# Patient Record
Sex: MALE | Race: BLACK OR AFRICAN AMERICAN | NOT HISPANIC OR LATINO | Employment: OTHER | URBAN - METROPOLITAN AREA
[De-identification: names, ages, dates, MRNs, and addresses within clinical notes are randomized per-mention and may not be internally consistent; named-entity substitution may affect disease eponyms.]

---

## 2017-03-20 RX ORDER — POTASSIUM CHLORIDE 1500 MG/1
TABLET, EXTENDED RELEASE ORAL
Qty: 180 TABLET | Refills: 0 | Status: SHIPPED | OUTPATIENT
Start: 2017-03-20 | End: 2017-07-05 | Stop reason: SDUPTHER

## 2017-03-20 RX ORDER — LEVOTHYROXINE SODIUM 75 UG/1
TABLET ORAL
Qty: 90 TABLET | Refills: 0 | Status: SHIPPED | OUTPATIENT
Start: 2017-03-20 | End: 2017-08-21 | Stop reason: SDUPTHER

## 2017-03-20 RX ORDER — METOPROLOL SUCCINATE 200 MG/1
TABLET, EXTENDED RELEASE ORAL
Qty: 90 TABLET | Refills: 0 | Status: SHIPPED | OUTPATIENT
Start: 2017-03-20 | End: 2017-05-01 | Stop reason: SDUPTHER

## 2017-03-20 RX ORDER — NIFEDIPINE 60 MG/1
TABLET, EXTENDED RELEASE ORAL
Qty: 90 TABLET | Refills: 0 | Status: SHIPPED | OUTPATIENT
Start: 2017-03-20 | End: 2017-05-09

## 2017-05-01 RX ORDER — METOPROLOL SUCCINATE 200 MG/1
200 TABLET, EXTENDED RELEASE ORAL DAILY
Qty: 90 TABLET | Refills: 0 | Status: SHIPPED | OUTPATIENT
Start: 2017-05-01 | End: 2017-08-05 | Stop reason: SDUPTHER

## 2017-05-09 ENCOUNTER — OFFICE VISIT (OUTPATIENT)
Dept: INTERNAL MEDICINE | Facility: CLINIC | Age: 62
End: 2017-05-09
Attending: INTERNAL MEDICINE
Payer: MEDICARE

## 2017-05-09 VITALS
HEART RATE: 70 BPM | BODY MASS INDEX: 26.64 KG/M2 | SYSTOLIC BLOOD PRESSURE: 158 MMHG | WEIGHT: 179.88 LBS | DIASTOLIC BLOOD PRESSURE: 84 MMHG | HEIGHT: 69 IN | OXYGEN SATURATION: 99 %

## 2017-05-09 DIAGNOSIS — G89.29 CHRONIC LEFT HIP PAIN: ICD-10-CM

## 2017-05-09 DIAGNOSIS — E89.0 POSTOPERATIVE HYPOTHYROIDISM: Primary | ICD-10-CM

## 2017-05-09 DIAGNOSIS — M25.552 CHRONIC LEFT HIP PAIN: ICD-10-CM

## 2017-05-09 DIAGNOSIS — Z00.00 ANNUAL PHYSICAL EXAM: ICD-10-CM

## 2017-05-09 DIAGNOSIS — C7A.026 MALIGNANT CARCINOID TUMOR OF RECTUM: ICD-10-CM

## 2017-05-09 DIAGNOSIS — K59.00 CONSTIPATION, UNSPECIFIED CONSTIPATION TYPE: ICD-10-CM

## 2017-05-09 DIAGNOSIS — R35.1 NOCTURIA: ICD-10-CM

## 2017-05-09 DIAGNOSIS — N40.0 BENIGN PROSTATIC HYPERPLASIA, PRESENCE OF LOWER URINARY TRACT SYMPTOMS UNSPECIFIED, UNSPECIFIED MORPHOLOGY: ICD-10-CM

## 2017-05-09 DIAGNOSIS — E78.5 HYPERLIPIDEMIA, UNSPECIFIED HYPERLIPIDEMIA TYPE: ICD-10-CM

## 2017-05-09 DIAGNOSIS — M79.89 LEG SWELLING: ICD-10-CM

## 2017-05-09 DIAGNOSIS — I10 ESSENTIAL HYPERTENSION: ICD-10-CM

## 2017-05-09 DIAGNOSIS — G57.11 MERALGIA PARESTHETICA OF RIGHT SIDE: ICD-10-CM

## 2017-05-09 PROCEDURE — 99499 UNLISTED E&M SERVICE: CPT | Mod: S$PBB,,, | Performed by: INTERNAL MEDICINE

## 2017-05-09 PROCEDURE — 3077F SYST BP >= 140 MM HG: CPT | Mod: S$GLB,,, | Performed by: INTERNAL MEDICINE

## 2017-05-09 PROCEDURE — 90670 PCV13 VACCINE IM: CPT | Mod: S$GLB,,, | Performed by: INTERNAL MEDICINE

## 2017-05-09 PROCEDURE — G0009 ADMIN PNEUMOCOCCAL VACCINE: HCPCS | Mod: S$GLB,,, | Performed by: INTERNAL MEDICINE

## 2017-05-09 PROCEDURE — 99214 OFFICE O/P EST MOD 30 MIN: CPT | Mod: 25,S$GLB,, | Performed by: INTERNAL MEDICINE

## 2017-05-09 PROCEDURE — 1160F RVW MEDS BY RX/DR IN RCRD: CPT | Mod: S$GLB,,, | Performed by: INTERNAL MEDICINE

## 2017-05-09 PROCEDURE — 99999 PR PBB SHADOW E&M-EST. PATIENT-LVL III: CPT | Mod: PBBFAC,,, | Performed by: INTERNAL MEDICINE

## 2017-05-09 PROCEDURE — 3079F DIAST BP 80-89 MM HG: CPT | Mod: S$GLB,,, | Performed by: INTERNAL MEDICINE

## 2017-05-09 RX ORDER — LOSARTAN POTASSIUM 100 MG/1
100 TABLET ORAL DAILY
Qty: 90 TABLET | Refills: 1 | Status: SHIPPED | OUTPATIENT
Start: 2017-05-09 | End: 2017-11-22 | Stop reason: SDUPTHER

## 2017-05-09 RX ORDER — NIFEDIPINE 30 MG/1
30 TABLET, FILM COATED, EXTENDED RELEASE ORAL DAILY
Qty: 90 TABLET | Refills: 1 | Status: SHIPPED | OUTPATIENT
Start: 2017-05-09 | End: 2017-08-28 | Stop reason: SDUPTHER

## 2017-05-09 RX ORDER — TRAMADOL HYDROCHLORIDE 50 MG/1
50 TABLET ORAL EVERY 8 HOURS PRN
Qty: 90 TABLET | Refills: 0 | Status: SHIPPED | OUTPATIENT
Start: 2017-05-09 | End: 2018-12-07 | Stop reason: SDUPTHER

## 2017-05-09 RX ORDER — GABAPENTIN 300 MG/1
300 CAPSULE ORAL 3 TIMES DAILY
Qty: 90 CAPSULE | Refills: 11 | Status: SHIPPED | OUTPATIENT
Start: 2017-05-09 | End: 2017-05-30

## 2017-05-09 NOTE — PATIENT INSTRUCTIONS
High fiber diet - 25 grams per day. Emphasis on whole grain breads such as double fiber wheat bread and high fiber cereals and bars.   Drink 8 glasses of water per day 64 - 96 oz per day  Fiber supplements such as KONSYL, METAMUCIL can be taken 1-2 x per day  Regular exercise - 30 min brisk walking 5 days per week  Stool softener such as colace 1 tablet twice a day. If works too well then can take 1 tablet a day or every other day.

## 2017-05-09 NOTE — PROGRESS NOTES
Patient given Prevnar 13 iM in the LD. Patient tolerated well and Band-Aid was applied. Lot#[id number] Exp:04/01/2018. Patient advised to wait in the lobby for 15 min to make sure no adverse reactions occur.Patient given VIS information sheet. Patient states verbal understanding and has no further questions.

## 2017-05-09 NOTE — PROGRESS NOTES
Subjective:       Patient ID: Ronnie Sandoval is a 61 y.o. male.    Chief Complaint: Hypertension    HPI Comments: Here for annual exam  Insurance has changed and he would like for me to be his PCP    HTN  Adheres to low salt diet. He is complaint with antihypertensive medications listed in medication list.  Denies any chest pain, SOB, orthopnea or PND. Several month Hx of bilateral swelling of legs to the point where it becomes painful. Swelling does subside overnight with elevation of legs.    Continued left hip pain. 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 He did not tolerate chemotherapy so he self d/c'd. Was followed by Dr. Hernandez. Patient reports gradual progression of abdominal swelling. He denies pain. He does suffer from longstanding constipation. This improved some with increasing his hydration to 3-40oz of water daily. Does not take his stool softener regularly.    He reports right thigh pain that is described as burning and tingling and is restricted to jean-pierre-lateral portion of right thigh and only occurs with sitting and is resolved within 30 seconds of standing.     Continued LUTS symptoms since discontinuing alpha blocker. He reports trying 2 in the past and both caused frequent dizziness to the point of syncope prompting him to go to ED. He has not had anymore dizziness symptoms since stopping. He continues to suffer from nocturia 5 times nightly.       Review of Systems   Constitutional: Negative for appetite change, chills, fever and unexpected weight change.   HENT: Negative for hearing loss, sore throat and trouble swallowing.    Eyes: Negative for visual disturbance.   Respiratory: Negative for cough, chest tightness and shortness of breath.    Cardiovascular: Negative for chest pain and leg swelling.   Gastrointestinal: Negative for abdominal pain, blood in stool, constipation, diarrhea, nausea and vomiting.  "  Endocrine: Negative for polydipsia and polyuria.   Genitourinary: Positive for frequency. Negative for decreased urine volume, difficulty urinating, dysuria and urgency.   Musculoskeletal: Negative for gait problem.   Skin: Negative for rash.   Neurological: Negative for dizziness and numbness.   Psychiatric/Behavioral: The patient is not nervous/anxious.        Objective:      Vitals:    05/09/17 1017   BP: (!) 158/84   Pulse: 70   SpO2: 99%   Weight: 81.6 kg (179 lb 14.3 oz)   Height: 5' 9" (1.753 m)      Physical Exam   Constitutional: He is oriented to person, place, and time. He appears well-developed and well-nourished. No distress.   HENT:   Head: Normocephalic and atraumatic.   Mouth/Throat: Oropharynx is clear and moist. No oropharyngeal exudate.   Eyes: Conjunctivae and EOM are normal. Pupils are equal, round, and reactive to light. No scleral icterus.   Neck: No thyromegaly present.   Cardiovascular: Normal rate, regular rhythm and normal heart sounds.    No murmur heard.  Pulmonary/Chest: Effort normal and breath sounds normal. He has no wheezes. He has no rales.   Abdominal: Soft. Bowel sounds are normal. He exhibits distension and mass. He exhibits no shifting dullness and no fluid wave. There is no tenderness.   Musculoskeletal: He exhibits no edema or tenderness.   Lymphadenopathy:     He has no cervical adenopathy.   Neurological: He is alert and oriented to person, place, and time.   Skin: Skin is warm and dry.   Psychiatric: He has a normal mood and affect. His behavior is normal.       Assessment:       1. Postoperative hypothyroidism    2. Meralgia paresthetica of right side    3. Benign prostatic hyperplasia, presence of lower urinary tract symptoms unspecified, unspecified morphology    4. Chronic left hip pain    5. Leg swelling    6. Annual physical exam    7. Malignant carcinoid tumor of rectum    8. Hyperlipidemia, unspecified hyperlipidemia type    9. Essential hypertension    10. " Nocturia    11. Constipation, unspecified constipation type        Plan:       Ronnie was seen today for hypertension.    Diagnoses and all orders for this visit:    Postoperative hypothyroidism  -     TSH; Future    Meralgia paresthetica of right side  -suspect related to growing tumor burden. Trial of gabapentin.    -     gabapentin (NEURONTIN) 300 MG capsule; Take 1 capsule (300 mg total) by mouth 3 (three) times daily.    Benign prostatic hyperplasia, presence of lower urinary tract symptoms unspecified, unspecified morphology  -can not tolerate alpha blocker  -     Ambulatory referral to Urology    Chronic left hip pain  2/2 to mets. Ultram 2/2 CKD. SE discussed.  -     tramadol (ULTRAM) 50 mg tablet; Take 1 tablet (50 mg total) by mouth every 8 (eight) hours as needed for Pain.  Leg swelling  -Hx of diastolic dysfunction. On CCB.   -     Brain natriuretic peptide; Future    Annual physical exam  -     CBC auto differential; Future  -     Comprehensive metabolic panel; Future    Malignant carcinoid tumor of rectum  -pt not interested in treatment. Will continue to monitor.  -     Pneumococcal Conjugate Vaccine (13 Valent) (IM)    Hyperlipidemia, unspecified hyperlipidemia type    Essential hypertension  -decrease nifedipine to 30mg in event contributing to LE edema. Increase losartan from 25 to 100mg  f/u in 3-4 weeks for nurse visit for BP check   -     nifedipine (ADALAT CC) 30 MG TbSR; Take 1 tablet (30 mg total) by mouth once daily.  -     losartan (COZAAR) 100 MG tablet; Take 1 tablet (100 mg total) by mouth once daily.    Constipation, unspecified constipation type  -consistent OTC measures discussed.                 Side effects of medication(s) were discussed in detail and patient voiced understanding.  Patient will call back for any issues or complications.

## 2017-05-09 NOTE — MR AVS SNAPSHOT
[facility] Internal Medicine  [address]  [facility] [address]  Phone: [phone]  Fax: [phone]                  Ronnie Sandoval   2017 10:20 AM   Office Visit    Description:  Male : 1955   Provider:  Daniel Hoover MD   Department:  [facility]  Internal Medicine           Reason for Visit     Hypertension           Diagnoses this Visit        Comments    Postoperative hypothyroidism    -  Primary     Meralgia paresthetica of right side         Benign prostatic hyperplasia, presence of lower urinary tract symptoms unspecified, unspecified morphology         Chronic left hip pain         Leg swelling         Annual physical exam         Malignant carcinoid tumor of rectum                To Do List           Future Appointments        Provider Department Dept Phone    2017 12:30 PM LAB, SAME DAY [facility] [phone]    2017 9:00 AM NURSE, [facility] INTERNAL MEDICINE [facility]  Internal Medicine [phone]    2017 9:30 AM Rafael Holland MD [facility] Urology [phone]      Goals (5 Years of Data)     None      Follow-Up and Disposition     Return in about 3 months (around 2017).       These Medications        Disp Refills Start End    tramadol (ULTRAM) 50 mg tablet 90 tablet 0 2017     Take 1 tablet (50 mg total) by mouth every 8 (eight) hours as needed for Pain. - Oral    Pharmacy: [facility] [address] [place] [address] [address] Ph #: [phone]       gabapentin (NEURONTIN) 300 MG capsule 90 capsule 11 2017    Take 1 capsule (300 mg total) by mouth 3 (three) times daily. - Oral    Pharmacy: [facility] [place], [place] - [address] Ph #: [phone]       nifedipine (ADALAT CC) 30 MG TbSR 90 tablet 1 2017    Take 1 tablet (30 mg total) by mouth once daily. - Oral    Pharmacy: [facility] [place], [place] - [address] Ph #: [phone]       losartan  (COZAAR) 100 MG tablet 90 tablet 1 5/9/2017     Take 1 tablet (100 mg total) by mouth once daily. - Oral    Pharmacy: [facility] [address] [place] [address] [address] Ph #: [phone]         [facility]     [facility] Nurse Care Line - 24/7 Assistance  Unless otherwise directed by your provider, please contact [facility], our nurse care line that is available for 24/7 assistance.     Registered nurses in the [facility] provide: appointment scheduling, clinical advisement, health education, and other advisory services.  Call: [phone] (toll free)               Medications           Message regarding Medications     Verify the changes and/or additions to your medication regime listed below are the same as discussed with your clinician today.  If any of these changes or additions are incorrect, please notify your healthcare provider.        START taking these NEW medications        Refills    tramadol (ULTRAM) 50 mg tablet 0    Sig: Take 1 tablet (50 mg total) by mouth every 8 (eight) hours as needed for Pain.    Class: Print    Route: Oral    gabapentin (NEURONTIN) 300 MG capsule 11    Sig: Take 1 capsule (300 mg total) by mouth 3 (three) times daily.    Class: Normal    Route: Oral    nifedipine (ADALAT CC) 30 MG TbSR 1    Sig: Take 1 tablet (30 mg total) by mouth once daily.    Class: Normal    Route: Oral      CHANGE how you are taking these medications     Start Taking Instead of    losartan (COZAAR) 100 MG tablet losartan (COZAAR) 25 MG tablet    Dosage:  Take 1 tablet (100 mg total) by mouth once daily. Dosage:  TAKE TWO TABLETS BY MOUTH ONCE DAILY    Reason for Change:  Reorder       STOP taking these medications     naproxen (NAPROSYN) 250 MG tablet Take 1 tablet (250 mg total) by mouth 2 (two) times daily.    ondansetron (ZOFRAN-ODT) 4 MG TbDL Take 1 tablet (4 mg total) by mouth every 6 (six) hours as needed.    pravastatin (PRAVACHOL) 40 MG tablet TAKE ONE TABLET  "BY MOUTH ONCE DAILY    promethazine (PHENERGAN) 6.25 mg/5 mL syrup Take 10 mLs (12.5 mg total) by mouth every 8 (eight) hours as needed (cough).    nifedipine (PROCARDIA-XL) 60 MG (OSM) 24 hr tablet TAKE ONE TABLET BY MOUTH ONCE DAILY           Verify that the below list of medications is an accurate representation of the medications you are currently taking.  If none reported, the list may be blank. If incorrect, please contact your healthcare provider. Carry this list with you in case of emergency.           Current Medications     KLOR-CON M20 20 mEq tablet TAKE ONE TABLET BY MOUTH TWICE DAILY    levothyroxine (SYNTHROID) 75 MCG tablet TAKE ONE TABLET BY MOUTH ONCE DAILY BEFORE BREAKFAST    losartan (COZAAR) 100 MG tablet Take 1 tablet (100 mg total) by mouth once daily.    metoprolol succinate (TOPROL-XL) 200 MG 24 hr tablet Take 1 tablet (200 mg total) by mouth once daily.    gabapentin (NEURONTIN) 300 MG capsule Take 1 capsule (300 mg total) by mouth 3 (three) times daily.    nifedipine (ADALAT CC) 30 MG TbSR Take 1 tablet (30 mg total) by mouth once daily.    tramadol (ULTRAM) 50 mg tablet Take 1 tablet (50 mg total) by mouth every 8 (eight) hours as needed for Pain.           Clinical Reference Information           Your Vitals Were     BP Pulse Height Weight SpO2 BMI    158/84 70 5' 9" (1.753 m) 81.6 kg (179 lb 14.3 oz) 99% 26.57 kg/m2      Blood Pressure          Most Recent Value    BP  (!)  158/84      Allergies as of 5/9/2017     No Known Allergies      Immunizations Administered on Date of Encounter - 5/9/2017     Name Date Dose VIS Date Route    Pneumococcal Conjugate - 13 Valent  Incomplete 0.5 mL 11/5/2015 Intramuscular      Orders Placed During Today's Visit      Normal Orders This Visit    Ambulatory referral to Urology     Pneumococcal Conjugate Vaccine (13 Valent) (IM)     Future Labs/Procedures Expected by Expires    Brain natriuretic peptide  5/9/2017 8/7/2017    CBC auto differential  " 5/9/2017 5/9/2018    Comprehensive metabolic panel  5/9/2017 7/8/2018    TSH  5/9/2017 5/9/2018      Instructions    High fiber diet - 25 grams per day. Emphasis on whole grain breads such as double fiber wheat bread and high fiber cereals and bars.   Drink 8 glasses of water per day 64 - 96 oz per day  Fiber supplements such as KONSYL, METAMUCIL can be taken 1-2 x per day  Regular exercise - 30 min brisk walking 5 days per week  Stool softener such as colace 1 tablet twice a day. If works too well then can take 1 tablet a day or every other day.         Language Assistance Services     ATTENTION: Language assistance services are available, free of charge. Please call [phone].      ATENCIÓN: Si alberto fung, tiene a nguyễn disposición servicios gratuitos de asistencia lingüística. Llame al [phone].     CHÚ Ý: N?u b?n nói Ti?ng Vi?t, có các d?ch v? h? tr? ngôn ng? mi?n phí dành cho b?n. G?i s? [phone].         [facility] complies with applicable Federal civil rights laws and does not discriminate on the basis of race, color, national origin, age, disability, or sex.

## 2017-05-30 ENCOUNTER — LAB VISIT (OUTPATIENT)
Dept: LAB | Facility: OTHER | Age: 62
End: 2017-05-30
Attending: INTERNAL MEDICINE
Payer: MEDICARE

## 2017-05-30 ENCOUNTER — OFFICE VISIT (OUTPATIENT)
Dept: UROLOGY | Facility: CLINIC | Age: 62
End: 2017-05-30
Attending: INTERNAL MEDICINE
Payer: MEDICARE

## 2017-05-30 ENCOUNTER — CLINICAL SUPPORT (OUTPATIENT)
Dept: INTERNAL MEDICINE | Facility: CLINIC | Age: 62
End: 2017-05-30
Payer: MEDICARE

## 2017-05-30 VITALS
HEART RATE: 70 BPM | BODY MASS INDEX: 26.6 KG/M2 | HEIGHT: 69 IN | DIASTOLIC BLOOD PRESSURE: 99 MMHG | WEIGHT: 179.56 LBS | SYSTOLIC BLOOD PRESSURE: 186 MMHG

## 2017-05-30 DIAGNOSIS — E89.0 POSTOPERATIVE HYPOTHYROIDISM: ICD-10-CM

## 2017-05-30 DIAGNOSIS — N40.1 BENIGN NON-NODULAR PROSTATIC HYPERPLASIA WITH LOWER URINARY TRACT SYMPTOMS: ICD-10-CM

## 2017-05-30 DIAGNOSIS — M79.89 LEG SWELLING: ICD-10-CM

## 2017-05-30 DIAGNOSIS — Z00.00 ANNUAL PHYSICAL EXAM: ICD-10-CM

## 2017-05-30 DIAGNOSIS — R39.15 URINARY URGENCY: Primary | ICD-10-CM

## 2017-05-30 LAB
ALBUMIN SERPL BCP-MCNC: 3.6 G/DL
ALP SERPL-CCNC: 104 U/L
ALT SERPL W/O P-5'-P-CCNC: 19 U/L
ANION GAP SERPL CALC-SCNC: 8 MMOL/L
AST SERPL-CCNC: 40 U/L
BASOPHILS # BLD AUTO: 0.08 K/UL
BASOPHILS NFR BLD: 1.5 %
BILIRUB SERPL-MCNC: 2.7 MG/DL
BILIRUB SERPL-MCNC: ABNORMAL MG/DL
BLOOD URINE, POC: ABNORMAL
BNP SERPL-MCNC: 899 PG/ML
BUN SERPL-MCNC: 23 MG/DL
CALCIUM SERPL-MCNC: 9.4 MG/DL
CHLORIDE SERPL-SCNC: 110 MMOL/L
CO2 SERPL-SCNC: 27 MMOL/L
COLOR, POC UA: ABNORMAL
CREAT SERPL-MCNC: 1.5 MG/DL
DIFFERENTIAL METHOD: ABNORMAL
EOSINOPHIL # BLD AUTO: 0.2 K/UL
EOSINOPHIL NFR BLD: 2.9 %
ERYTHROCYTE [DISTWIDTH] IN BLOOD BY AUTOMATED COUNT: 16 %
EST. GFR  (AFRICAN AMERICAN): 56 ML/MIN/1.73 M^2
EST. GFR  (NON AFRICAN AMERICAN): 49 ML/MIN/1.73 M^2
GLUCOSE SERPL-MCNC: 76 MG/DL
GLUCOSE UR QL STRIP: ABNORMAL
HCT VFR BLD AUTO: 27.6 %
HGB BLD-MCNC: 8.3 G/DL
KETONES UR QL STRIP: ABNORMAL
LEUKOCYTE ESTERASE URINE, POC: ABNORMAL
LYMPHOCYTES # BLD AUTO: 1.5 K/UL
LYMPHOCYTES NFR BLD: 26.6 %
MCH RBC QN AUTO: 31.8 PG
MCHC RBC AUTO-ENTMCNC: 30.1 %
MCV RBC AUTO: 106 FL
MONOCYTES # BLD AUTO: 0.3 K/UL
MONOCYTES NFR BLD: 4.9 %
NEUTROPHILS # BLD AUTO: 3.5 K/UL
NEUTROPHILS NFR BLD: 64.1 %
NITRITE, POC UA: ABNORMAL
PH, POC UA: 7
PLATELET # BLD AUTO: 176 K/UL
PMV BLD AUTO: 12.5 FL
POTASSIUM SERPL-SCNC: 3.8 MMOL/L
PROT SERPL-MCNC: 6.8 G/DL
PROTEIN, POC: ABNORMAL
RBC # BLD AUTO: 2.61 M/UL
SODIUM SERPL-SCNC: 145 MMOL/L
SPECIFIC GRAVITY, POC UA: 1
TSH SERPL DL<=0.005 MIU/L-ACNC: 3.82 UIU/ML
UROBILINOGEN, POC UA: 4
WBC # BLD AUTO: 5.46 K/UL

## 2017-05-30 PROCEDURE — 83880 ASSAY OF NATRIURETIC PEPTIDE: CPT

## 2017-05-30 PROCEDURE — 85025 COMPLETE CBC W/AUTO DIFF WBC: CPT

## 2017-05-30 PROCEDURE — 36415 COLL VENOUS BLD VENIPUNCTURE: CPT

## 2017-05-30 PROCEDURE — 84443 ASSAY THYROID STIM HORMONE: CPT

## 2017-05-30 PROCEDURE — 99204 OFFICE O/P NEW MOD 45 MIN: CPT | Mod: 25,S$GLB,, | Performed by: UROLOGY

## 2017-05-30 PROCEDURE — 80053 COMPREHEN METABOLIC PANEL: CPT

## 2017-05-30 PROCEDURE — 51798 US URINE CAPACITY MEASURE: CPT | Mod: S$GLB,,, | Performed by: UROLOGY

## 2017-05-30 PROCEDURE — 81001 URINALYSIS AUTO W/SCOPE: CPT | Mod: S$GLB,,, | Performed by: UROLOGY

## 2017-05-30 PROCEDURE — 99999 PR PBB SHADOW E&M-EST. PATIENT-LVL I: CPT | Mod: PBBFAC,,,

## 2017-05-30 PROCEDURE — 99999 PR PBB SHADOW E&M-EST. PATIENT-LVL II: CPT | Mod: PBBFAC,,, | Performed by: UROLOGY

## 2017-05-30 NOTE — PROGRESS NOTES
Subjective:      Ronnie Sandoval is a 61 y.o. male who was referred by Dr. Hoover for evaluation of his LUTS.      Benign Prostatic Hypertrophy  Patient complains of lower urinary tract symptoms. He reports frequency, nocturia more than five times a night, straining, urgency and weak stream. He denies incomplete emptying. Patient states symptoms are of moderate severity. Onset of symptoms was a few years ago and was gradual in onset. His AUA Symptom Score is, 18/4 manifested as irritative symptoms including frequency, urgency, nocturia and obstructive symptoms including intermittency, weak stream, straining. He has no personal history and no family history of prostate cancer. He reports a history of no complicating symptoms. He denies flank pain, gross hematuria, kidney stones and recurrent UTI.    Today the patient reports nocturia x5/night, frequency, urgency, and slow urinary stream. He denies incomplete bladder emptying. The patient reports that he has tried flomax last year; however he had to discontinue due to dizziness and syncope. The patient does not consume large amounts of caffeine per day. He does not limit PM fluid intake. Does report lower extremity swelling. He denies frequent UTIs and kidney stones.     The following portions of the patient's history were reviewed and updated as appropriate: allergies, current medications, past family history, past medical history, past social history, past surgical history and problem list.    Review of Systems  Constitutional: no fever or chills  ENT: no nasal congestion or sore throat  Respiratory: no cough or shortness of breath  Cardiovascular: no chest pain or palpitations  Gastrointestinal: no nausea or vomiting, tolerating diet  Genitourinary: as per HPI  Hematologic/Lymphatic: no easy bruising or lymphadenopathy  Musculoskeletal: no arthralgias or myalgias  Neurological: no seizures or tremors  Behavioral/Psych: no auditory or visual hallucinations      Objective:   Vitals:   Vitals:    05/30/17 0957   BP: (!) 186/99   Pulse: 70     Physical Exam   General: alert and oriented, no acute distress  Head: normocephalic, atraumatic  Neck: supple, no lymphadenopathy, normal ROM, no masses  Respiratory: Symmetric expansion, non-labored breathing  Cardiovascular: regular rate and rhythm, nomal pulses, no peripheral edema  Abdomen: soft, non tender, non distended, no palpable masses, no hernias, no hepatomegaly or splenomegaly  Genitourinary:   Prostate: enlarged: bilateral, size: 35 gm; seminal vesicles not palpated  Rectum: normal rectal tone, no rectal mass, normal perineum  Lymphatic: no inguinal nodes  Skin: normal coloration and turgor, no rashes, no suspicious skin lesions noted  Neuro: alert and oriented x3, no gross deficits  Psych: normal judgment and insight, normal mood/affect and non-anxious    Lab Review   Urinalysis demonstrates positive for red blood cells (about 250 hubert/mL) protein (trace), urobilinogen  PVR: 0 mL  Lab Results   Component Value Date    WBC 4.25 08/15/2016    HGB 10.1 (L) 08/15/2016    HCT 31.5 (L) 08/15/2016    HCT 36 08/05/2013    MCV 99 (H) 08/15/2016     08/15/2016     Lab Results   Component Value Date    CREATININE 1.6 (H) 08/15/2016    BUN 23 08/15/2016     Lab Results   Component Value Date    PSA 0.82 01/11/2016     Imaging   None    Assessment:   BPH with LUTS     Plan:   Ronnie was seen today for urinary tract infection and urinary frequency.    Diagnoses and all orders for this visit:    Urinary urgency  -     POCT URINE DIPSTICK WITH MICROSCOPE, AUTOMATED  -     POCT Bladder Scan  -     mirabegron (MYRBETRIQ) 50 mg Tb24; Take 1 tablet (50 mg total) by mouth once daily at 6am.  -     Urine culture    Benign non-nodular prostatic hyperplasia with lower urinary tract symptoms      Plan:   --Encouraged the patient to limit PM fluids and to elevate his legs prior to going to sleep at night   --Trial of myrbetriq for OAB  symptoms  --Follow up in 6 weeks   --May consider the urolift procedure if symptoms persist

## 2017-05-30 NOTE — PROGRESS NOTES
Ronnie Sandoval 61 y.o. male is here today for Blood Pressure check.   History of HTN yes.    Review of patient's allergies indicates:  No Known Allergies  Creatinine   Date Value Ref Range Status   08/15/2016 1.6 (H) 0.5 - 1.4 mg/dL Final     Sodium   Date Value Ref Range Status   08/15/2016 143 136 - 145 mmol/L Final     Potassium   Date Value Ref Range Status   08/15/2016 4.0 3.5 - 5.1 mmol/L Final   ]  Patient verifies taking blood pressure medications on a regular bases at the same time of the day.     Current Outpatient Prescriptions:     gabapentin (NEURONTIN) 300 MG capsule, Take 1 capsule (300 mg total) by mouth 3 (three) times daily., Disp: 90 capsule, Rfl: 11    KLOR-CON M20 20 mEq tablet, TAKE ONE TABLET BY MOUTH TWICE DAILY, Disp: 180 tablet, Rfl: 0    levothyroxine (SYNTHROID) 75 MCG tablet, TAKE ONE TABLET BY MOUTH ONCE DAILY BEFORE BREAKFAST, Disp: 90 tablet, Rfl: 0    losartan (COZAAR) 100 MG tablet, Take 1 tablet (100 mg total) by mouth once daily., Disp: 90 tablet, Rfl: 1    metoprolol succinate (TOPROL-XL) 200 MG 24 hr tablet, Take 1 tablet (200 mg total) by mouth once daily., Disp: 90 tablet, Rfl: 0    nifedipine (ADALAT CC) 30 MG TbSR, Take 1 tablet (30 mg total) by mouth once daily., Disp: 90 tablet, Rfl: 1    tramadol (ULTRAM) 50 mg tablet, Take 1 tablet (50 mg total) by mouth every 8 (eight) hours as needed for Pain., Disp: 90 tablet, Rfl: 0    Current Facility-Administered Medications:     potassium iodide tablet 130 mg, 130 mg, Oral, On Call Procedure, Adryan Hernandez, DO, FACP  Does patient have record of home blood pressure readings no. Readings have been averaging n/a.   Last dose of blood pressure medication was taken at 7:45am 5/30/17.  Patient is asymptomatic.   Complains of mild pain in legs.      ,  .first reading: /100, Pulse 68     Blood pressure reading after 15 minutes was 130/90, Pulse 67.  Dr. Hoover notified.   Medication change: Added nifedipine 30 mg back,  nurse visit scheduled 3 week follow up nurse visit.

## 2017-05-30 NOTE — LETTER
May 30, 2017      Daniel Hoover MD  [address]  [address]  [facility] [address]           [facility]  [address] [place] [address], [facility] [address]  [facility] [address]  Phone: [phone]          Patient: Ronnie Sandoval   MR Number: [id number]   YOB: 1955   Date of Visit: 5/30/2017       Dear Dr. Daniel Hoover:    Thank you for referring Ronnie Sandoval to me for evaluation. Attached you will find relevant portions of my assessment and plan of care.    If you have questions, please do not hesitate to call me. I look forward to following Ronnie Sandoval along with you.    Sincerely,    Rafael Holland MD    Enclosure  CC:  No Recipients    If you would like to receive this communication electronically, please contact [email or web address] or [phone] to request more information on [email or web address] Link access.    For providers and/or their staff who would like to refer a patient to [facility], please contact us through our one-stop-shop provider referral line, [facility], at [phone].    If you feel you have received this communication in error or would no longer like to receive these types of communications, please e-mail [email or web address]

## 2017-05-31 ENCOUNTER — PATIENT MESSAGE (OUTPATIENT)
Dept: INTERNAL MEDICINE | Facility: CLINIC | Age: 62
End: 2017-05-31

## 2017-06-01 LAB — BACTERIA UR CULT: NO GROWTH

## 2017-07-05 RX ORDER — POTASSIUM CHLORIDE 1500 MG/1
TABLET, EXTENDED RELEASE ORAL
Qty: 180 TABLET | Refills: 2 | Status: SHIPPED | OUTPATIENT
Start: 2017-07-05 | End: 2018-05-05 | Stop reason: SDUPTHER

## 2017-07-25 ENCOUNTER — OFFICE VISIT (OUTPATIENT)
Dept: UROLOGY | Facility: CLINIC | Age: 62
End: 2017-07-25
Attending: UROLOGY
Payer: MEDICARE

## 2017-07-25 VITALS
DIASTOLIC BLOOD PRESSURE: 84 MMHG | BODY MASS INDEX: 26.22 KG/M2 | HEIGHT: 69 IN | WEIGHT: 177 LBS | SYSTOLIC BLOOD PRESSURE: 140 MMHG | HEART RATE: 83 BPM

## 2017-07-25 DIAGNOSIS — R31.29 MICROSCOPIC HEMATURIA: ICD-10-CM

## 2017-07-25 DIAGNOSIS — N40.1 BENIGN NON-NODULAR PROSTATIC HYPERPLASIA WITH LOWER URINARY TRACT SYMPTOMS: Primary | ICD-10-CM

## 2017-07-25 LAB
BILIRUB SERPL-MCNC: ABNORMAL MG/DL
BLOOD URINE, POC: 250
COLOR, POC UA: ABNORMAL
GLUCOSE UR QL STRIP: 100
KETONES UR QL STRIP: ABNORMAL
LEUKOCYTE ESTERASE URINE, POC: ABNORMAL
NITRITE, POC UA: ABNORMAL
PH, POC UA: 5
POC RESIDUAL URINE VOLUME: 23 ML (ref 0–100)
PROTEIN, POC: 30
SPECIFIC GRAVITY, POC UA: 1.01
UROBILINOGEN, POC UA: ABNORMAL

## 2017-07-25 PROCEDURE — 87086 URINE CULTURE/COLONY COUNT: CPT

## 2017-07-25 PROCEDURE — 51798 US URINE CAPACITY MEASURE: CPT | Mod: S$GLB,,, | Performed by: UROLOGY

## 2017-07-25 PROCEDURE — 99999 PR PBB SHADOW E&M-EST. PATIENT-LVL III: CPT | Mod: PBBFAC,,, | Performed by: UROLOGY

## 2017-07-25 PROCEDURE — 99214 OFFICE O/P EST MOD 30 MIN: CPT | Mod: 25,S$GLB,, | Performed by: UROLOGY

## 2017-07-25 PROCEDURE — 81002 URINALYSIS NONAUTO W/O SCOPE: CPT | Mod: S$GLB,,, | Performed by: UROLOGY

## 2017-07-25 NOTE — PROGRESS NOTES
"Subjective:      Ronnie Sandoval is a 61 y.o. male who returns today regarding his BPH.    At initial visit 2 mos ago he reported frequency, nocturia more than five times a night, straining, urgency and weak stream. His AUASS was 18/4. He had previously failed flomax due to dizziness. He was given trial of myrbetriq at that time.    Today he reports no symptom improvement. He stopped myrbetriq after 1 month. His AUASS is 21/6.    The following portions of the patient's history were reviewed and updated as appropriate: allergies, current medications, past family history, past medical history, past social history, past surgical history and problem list.    Review of Systems  A comprehensive multipoint review of systems was negative except as otherwise stated in the HPI.     Objective:   Vitals: BP (!) 140/84 (BP Location: Left arm, Patient Position: Sitting, BP Method: Automatic)   Pulse 83   Ht 5' 9" (1.753 m)   Wt 80.3 kg (177 lb 0.5 oz)   BMI 26.14 kg/m²     Physical Exam   General: alert and oriented, no acute distress  Respiratory: Symmetric expansion, non-labored breathing  Skin: normal coloration and turgor, no rashes, no suspicious skin lesions noted  Neuro: no gross deficits  Psych: normal judgment and insight, normal mood/affect and non-anxious    Bladder Scan PVR: 23cc      Lab Review   Urinalysis demonstrates positive for red blood cells  Lab Results   Component Value Date    WBC 5.46 05/30/2017    HGB 8.3 (L) 05/30/2017    HCT 27.6 (L) 05/30/2017    HCT 36 08/05/2013     (H) 05/30/2017     05/30/2017     Lab Results   Component Value Date    CREATININE 1.5 (H) 05/30/2017    BUN 23 05/30/2017     Lab Results   Component Value Date    PSA 0.82 01/11/2016       Assessment and Plan:   1. Benign non-nodular prostatic hyperplasia with lower urinary tract symptoms  -- Symptoms not improved w/ myrbetriq and previously failed flomax d/t SEs  -- Cysto to evaluate for Urolift (vs TURP)    2. " Microscopic hematuria  -- UA micro and urine culture

## 2017-07-26 LAB — BACTERIA UR CULT: NO GROWTH

## 2017-07-29 ENCOUNTER — OFFICE VISIT (OUTPATIENT)
Dept: INTERNAL MEDICINE | Facility: CLINIC | Age: 62
End: 2017-07-29
Payer: MEDICARE

## 2017-07-29 ENCOUNTER — HOSPITAL ENCOUNTER (OUTPATIENT)
Facility: OTHER | Age: 62
Discharge: HOME | End: 2017-07-29
Attending: EMERGENCY MEDICINE
Payer: MEDICARE

## 2017-07-29 VITALS
HEIGHT: 69 IN | SYSTOLIC BLOOD PRESSURE: 146 MMHG | HEART RATE: 88 BPM | OXYGEN SATURATION: 92 % | BODY MASS INDEX: 25.77 KG/M2 | WEIGHT: 174 LBS | TEMPERATURE: 100 F | DIASTOLIC BLOOD PRESSURE: 79 MMHG | RESPIRATION RATE: 16 BRPM

## 2017-07-29 VITALS
DIASTOLIC BLOOD PRESSURE: 63 MMHG | BODY MASS INDEX: 26.53 KG/M2 | WEIGHT: 179.69 LBS | TEMPERATURE: 99 F | SYSTOLIC BLOOD PRESSURE: 117 MMHG | RESPIRATION RATE: 15 BRPM

## 2017-07-29 DIAGNOSIS — R07.2 PRECORDIAL CHEST PAIN: ICD-10-CM

## 2017-07-29 DIAGNOSIS — R19.07 GENERALIZED ABDOMINAL MASS: ICD-10-CM

## 2017-07-29 DIAGNOSIS — D64.9 ANEMIA, UNSPECIFIED TYPE: ICD-10-CM

## 2017-07-29 DIAGNOSIS — R50.9 FUO (FEVER OF UNKNOWN ORIGIN): ICD-10-CM

## 2017-07-29 DIAGNOSIS — R09.89 CHEST CONGESTION: ICD-10-CM

## 2017-07-29 DIAGNOSIS — R09.1 PLEURISY: ICD-10-CM

## 2017-07-29 DIAGNOSIS — J18.9 PNEUMONIA OF LEFT LOWER LOBE DUE TO INFECTIOUS ORGANISM: Primary | ICD-10-CM

## 2017-07-29 DIAGNOSIS — C7A.026 MALIGNANT CARCINOID TUMOR OF RECTUM: Primary | ICD-10-CM

## 2017-07-29 LAB
ABO + RH BLD: NORMAL
ALBUMIN SERPL BCP-MCNC: 3.2 G/DL
ALP SERPL-CCNC: 100 U/L
ALT SERPL W/O P-5'-P-CCNC: 15 U/L
AMORPH CRY URNS QL MICRO: ABNORMAL
ANION GAP SERPL CALC-SCNC: 12 MMOL/L
AST SERPL-CCNC: 25 U/L
BACTERIA #/AREA URNS HPF: ABNORMAL /HPF
BASOPHILS # BLD AUTO: 0.04 K/UL
BASOPHILS NFR BLD: 0.5 %
BILIRUB SERPL-MCNC: 3.8 MG/DL
BILIRUB UR QL STRIP: ABNORMAL
BLD GP AB SCN CELLS X3 SERPL QL: NORMAL
BLD PROD TYP BPU: NORMAL
BLOOD UNIT EXPIRATION DATE: NORMAL
BLOOD UNIT TYPE CODE: 7300
BLOOD UNIT TYPE: NORMAL
BNP SERPL-MCNC: 388 PG/ML
BUN SERPL-MCNC: 29 MG/DL
CALCIUM SERPL-MCNC: 9.6 MG/DL
CHLORIDE SERPL-SCNC: 105 MMOL/L
CLARITY UR: CLEAR
CO2 SERPL-SCNC: 25 MMOL/L
CODING SYSTEM: NORMAL
COLOR UR: ABNORMAL
CREAT SERPL-MCNC: 1.7 MG/DL
DIFFERENTIAL METHOD: ABNORMAL
DISPENSE STATUS: NORMAL
EOSINOPHIL # BLD AUTO: 0.1 K/UL
EOSINOPHIL NFR BLD: 0.6 %
ERYTHROCYTE [DISTWIDTH] IN BLOOD BY AUTOMATED COUNT: 14.8 %
EST. GFR  (AFRICAN AMERICAN): 49 ML/MIN/1.73 M^2
EST. GFR  (NON AFRICAN AMERICAN): 42 ML/MIN/1.73 M^2
GLUCOSE SERPL-MCNC: 97 MG/DL
GLUCOSE UR QL STRIP: NEGATIVE
GRAN CASTS #/AREA URNS LPF: 2 /LPF
HCT VFR BLD AUTO: 24.8 %
HGB BLD-MCNC: 7.6 G/DL
HGB UR QL STRIP: ABNORMAL
HYALINE CASTS #/AREA URNS LPF: 8 /LPF
KETONES UR QL STRIP: ABNORMAL
LEUKOCYTE ESTERASE UR QL STRIP: NEGATIVE
LYMPHOCYTES # BLD AUTO: 1.1 K/UL
LYMPHOCYTES NFR BLD: 13.4 %
MCH RBC QN AUTO: 32.2 PG
MCHC RBC AUTO-ENTMCNC: 30.6 G/DL
MCV RBC AUTO: 105 FL
MICROSCOPIC COMMENT: ABNORMAL
MONOCYTES # BLD AUTO: 0.7 K/UL
MONOCYTES NFR BLD: 8.6 %
NEUTROPHILS # BLD AUTO: 6.3 K/UL
NEUTROPHILS NFR BLD: 76.7 %
NITRITE UR QL STRIP: POSITIVE
PH UR STRIP: 6 [PH] (ref 5–8)
PLATELET # BLD AUTO: 152 K/UL
PMV BLD AUTO: 12 FL
POTASSIUM SERPL-SCNC: 3.7 MMOL/L
PROT SERPL-MCNC: 7 G/DL
PROT UR QL STRIP: ABNORMAL
RBC # BLD AUTO: 2.36 M/UL
RBC #/AREA URNS HPF: 1 /HPF (ref 0–4)
SODIUM SERPL-SCNC: 142 MMOL/L
SP GR UR STRIP: 1.02 (ref 1–1.03)
TRANS ERYTHROCYTES VOL PATIENT: NORMAL ML
TROPONIN I SERPL DL<=0.01 NG/ML-MCNC: 0.02 NG/ML
URN SPEC COLLECT METH UR: ABNORMAL
UROBILINOGEN UR STRIP-ACNC: 1 EU/DL
WBC # BLD AUTO: 8.16 K/UL
WBC #/AREA URNS HPF: 0 /HPF (ref 0–5)

## 2017-07-29 PROCEDURE — 93010 ELECTROCARDIOGRAM REPORT: CPT | Mod: ,,, | Performed by: INTERNAL MEDICINE

## 2017-07-29 PROCEDURE — 85025 COMPLETE CBC W/AUTO DIFF WBC: CPT

## 2017-07-29 PROCEDURE — 81000 URINALYSIS NONAUTO W/SCOPE: CPT

## 2017-07-29 PROCEDURE — G0378 HOSPITAL OBSERVATION PER HR: HCPCS

## 2017-07-29 PROCEDURE — 80053 COMPREHEN METABOLIC PANEL: CPT

## 2017-07-29 PROCEDURE — 86901 BLOOD TYPING SEROLOGIC RH(D): CPT

## 2017-07-29 PROCEDURE — 63600175 PHARM REV CODE 636 W HCPCS: Performed by: EMERGENCY MEDICINE

## 2017-07-29 PROCEDURE — 99214 OFFICE O/P EST MOD 30 MIN: CPT | Mod: S$GLB,,, | Performed by: INTERNAL MEDICINE

## 2017-07-29 PROCEDURE — 96361 HYDRATE IV INFUSION ADD-ON: CPT

## 2017-07-29 PROCEDURE — 25000003 PHARM REV CODE 250: Performed by: EMERGENCY MEDICINE

## 2017-07-29 PROCEDURE — 99284 EMERGENCY DEPT VISIT MOD MDM: CPT | Mod: 25

## 2017-07-29 PROCEDURE — P9021 RED BLOOD CELLS UNIT: HCPCS

## 2017-07-29 PROCEDURE — 86900 BLOOD TYPING SEROLOGIC ABO: CPT

## 2017-07-29 PROCEDURE — 83880 ASSAY OF NATRIURETIC PEPTIDE: CPT

## 2017-07-29 PROCEDURE — 96365 THER/PROPH/DIAG IV INF INIT: CPT

## 2017-07-29 PROCEDURE — 36430 TRANSFUSION BLD/BLD COMPNT: CPT

## 2017-07-29 PROCEDURE — 96375 TX/PRO/DX INJ NEW DRUG ADDON: CPT

## 2017-07-29 PROCEDURE — 86920 COMPATIBILITY TEST SPIN: CPT

## 2017-07-29 PROCEDURE — 84484 ASSAY OF TROPONIN QUANT: CPT

## 2017-07-29 PROCEDURE — 93005 ELECTROCARDIOGRAM TRACING: CPT

## 2017-07-29 PROCEDURE — 99999 PR PBB SHADOW E&M-EST. PATIENT-LVL III: CPT | Mod: PBBFAC,,, | Performed by: INTERNAL MEDICINE

## 2017-07-29 RX ORDER — MOXIFLOXACIN HYDROCHLORIDE 400 MG/1
400 TABLET ORAL DAILY
Qty: 10 TABLET | Refills: 0 | Status: SHIPPED | OUTPATIENT
Start: 2017-07-29 | End: 2017-08-28

## 2017-07-29 RX ORDER — MOXIFLOXACIN HYDROCHLORIDE 400 MG/250ML
400 INJECTION, SOLUTION INTRAVENOUS
Status: COMPLETED | OUTPATIENT
Start: 2017-07-29 | End: 2017-07-29

## 2017-07-29 RX ORDER — FUROSEMIDE 10 MG/ML
20 INJECTION INTRAMUSCULAR; INTRAVENOUS
Status: DISCONTINUED | OUTPATIENT
Start: 2017-07-29 | End: 2017-07-29 | Stop reason: HOSPADM

## 2017-07-29 RX ORDER — HYDROCODONE BITARTRATE AND ACETAMINOPHEN 500; 5 MG/1; MG/1
TABLET ORAL
Status: DISCONTINUED | OUTPATIENT
Start: 2017-07-29 | End: 2017-07-29 | Stop reason: HOSPADM

## 2017-07-29 RX ADMIN — SODIUM CHLORIDE 250 ML: 0.9 INJECTION, SOLUTION INTRAVENOUS at 12:07

## 2017-07-29 RX ADMIN — FUROSEMIDE 20 MG: 10 INJECTION, SOLUTION INTRAMUSCULAR; INTRAVENOUS at 03:07

## 2017-07-29 RX ADMIN — MOXIFLOXACIN HYDROCHLORIDE 400 MG: 400 INJECTION, SOLUTION INTRAVENOUS at 01:07

## 2017-07-29 NOTE — ED NOTES
Pt rounding complete.  Pain 0/10, watching television, NAD.  Restroom and comfort needs addressed.  Pt updated on plan of care.  Call light within reach.  Will continue to monitor.

## 2017-07-29 NOTE — ED NOTES
Pt resting comfortably. Restroom and comfort addressed. MD at bedside. MD Garza to order a meal tray

## 2017-07-29 NOTE — PROGRESS NOTES
Subjective:       Patient ID: Ronnie Sandoval is a 61 y.o. male.    Chief Complaint: Fever; Fatigue; and Cough  Dictation #1  MRN:[id number]  CSN:[id number]  DICT [id number]  HPI  Review of Systems   Constitutional: Positive for chills, fatigue and fever. Negative for activity change, appetite change and unexpected weight change.   HENT: Negative for dental problem, hearing loss, mouth sores, postnasal drip and sinus pressure.    Eyes: Negative for discharge and visual disturbance.   Respiratory: Positive for cough. Negative for shortness of breath.    Cardiovascular: Positive for chest pain. Negative for palpitations.   Gastrointestinal: Positive for abdominal distention. Negative for abdominal pain, blood in stool, constipation, diarrhea and nausea.   Genitourinary: Positive for difficulty urinating and frequency. Negative for dysuria, hematuria and testicular pain.   Musculoskeletal: Negative for arthralgias, back pain, joint swelling and neck pain.   Skin: Negative for rash.   Neurological: Negative for weakness and headaches.   Psychiatric/Behavioral: Negative for agitation and sleep disturbance.       Objective:      Physical Exam   Constitutional: He is oriented to person, place, and time. He appears well-developed and well-nourished.   HENT:   Head: Normocephalic.   Right Ear: External ear normal.   Left Ear: External ear normal.   Mouth/Throat: Oropharynx is clear and moist.   Eyes: EOM are normal. Pupils are equal, round, and reactive to light. Right eye exhibits no discharge.   Neck: Normal range of motion. No JVD present. No tracheal deviation present. No thyromegaly present.   Cardiovascular: Normal rate, regular rhythm and intact distal pulses.  Exam reveals friction rub. Exam reveals no gallop.    No murmur heard.  Pulmonary/Chest: Effort normal. He has no wheezes. He has rales.   Abdominal: Soft. Bowel sounds are normal. He exhibits distension and mass. There is no tenderness.   Genitourinary: Prostate normal  and penis normal. Rectal exam shows guaiac negative stool.   Musculoskeletal: Normal range of motion. He exhibits no edema.   Lymphadenopathy:     He has no cervical adenopathy.   Neurological: He is oriented to person, place, and time. He displays normal reflexes. No cranial nerve deficit. Coordination normal.   Skin: Skin is warm. No rash noted. No pallor.   Psychiatric: He has a normal mood and affect.       Assessment:       1. Malignant carcinoid tumor of rectum    2. FUO (fever of unknown origin)    3. Precordial chest pain    4. Pleurisy    5. Generalized abdominal mass        Plan:

## 2017-07-29 NOTE — ED NOTES
Rounding has been done on Pt. Pt AAOx4. Pt resting on recliner with antibiotic running. Pt on continuous BP cuff, continuous pulse ox.  Pt wife at the bedside. Pt updated on POC. Call bell within reach. Will continue to monitor.

## 2017-07-29 NOTE — ED PROVIDER NOTES
Encounter Date: 7/29/2017    SCRIBE #1 NOTE: I, Sheryl Carbajal, am scribing for, and in the presence of,  Dr. Gordon. I have scribed the entire note.       History     Chief Complaint   Patient presents with    Chest Congestion     pt reports being seen at [facility] and was told to come to ED for further treatment/evaluation; possible pneumonia; non-productive, dry cough with no sore throat     Time seen by provider: 11:32 AM    This is a 61 y.o. male who presents with complaint of fatigue x 2 days. Pt exhibited fatigue which was exacerbated by time spent doing physical labor in the heat. He noticed an objective fever around 100.4 degrees, chest congestion, weakness, loss of appetite, and cough x 2 days. His cough is infrequent and dry. His wife stated he drank multiple bottles of Gatorade with minimal improvement in symptoms. Last night, pt had left sided, non-radiating CP that lasted about 2 hours and resolved without intervention. The CP was nonpleuritic but had accompanying wheezing. He did not take any medication for relief.     Patient also endorses abdominal distention and leg swelling that has gradually worsened over weeks. The leg swellinng is relieved with elevation and improved with compression stockings. 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 Pt had blood testing at last PCP visit; pt says there were no liver issues and that everything is ok. Pt has no heart disease, clogged arteries, or hx of MI. Pt has no hx of asthma, COPD, or PNA. He denies any SOB, N/V/D, constipation beyond baseline, sore throat, rhinorrhea, and congestion. He denies any pain at the moment. He states that the carcinoid has not gone into his lungs to his knowledge. He has no additional complaints.     Additional past medical, surgical,  and social history as outlined in the nursing assessment was reviewed by me.      The history is provided by the patient and the spouse.     Review of patient's allergies indicates:  No Known Allergies  Past Medical History:   Diagnosis Date    Chemotherapy follow-up examination 5/26/2015    Colon polyps     Hx antineoplastic chemotherapy 12/2014-1/2015     16 + Cisplatin    Hyperlipidemia     Hypertension     Malignant carcinoid tumor of rectum 5/26/2015    Malignant carcinoid tumor of the rectum     Neuroendocrine carcinoma, unknown primary site 4/14/2015    Papillary thyroid carcinoma 4/14/2015    Sec neuroend tumor-liver 11/2014    Secondary malignant neoplasm of bone 4/14/2015    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    left groin mass; Ki 67-20%    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    Urinary tract infection      Past Surgical History:   Procedure Laterality Date    COLONOSCOPY      excision of mass      left mastoid    LIVER BIOPSY  11/14    Mass in grion excised  8/2013    Carcinoid    Mass in groin excised  6/2013    Carcinoid    THYROIDECTOMY  2014    [place] - Carcinoid     Family History   Problem Relation Age of Onset    Leukemia Father     Cancer Father      leukemia    Stroke Sister     Cancer Brother      colon     Social History   Substance Use Topics    Smoking status: Never Smoker    Smokeless tobacco: Never Used      Comment: Quit in 1979    Alcohol use No     Review of Systems   Constitutional: Positive for appetite change (decrease), fatigue and fever (objective). Negative for chills and diaphoresis.   HENT: Negative for congestion, rhinorrhea and sore throat.    Respiratory: Positive for cough (non-productive) and wheezing (mild). Negative for choking and shortness of breath.         Chest congestion.   Cardiovascular: Positive for chest pain (left sided, non-radiating) and leg swelling. Negative for palpitations.    Gastrointestinal: Positive for abdominal distention (beyond baseline), abdominal pain (with palpation) and constipation (at baseline). Negative for diarrhea, nausea and vomiting.   Endocrine: Negative for polyuria.   Genitourinary: Negative for dysuria, frequency and urgency.   Musculoskeletal: Negative for back pain and neck pain.   Skin: Negative for color change, pallor, rash and wound.   Neurological: Positive for weakness (bilateral lower extremity). Negative for dizziness, light-headedness and headaches.   Hematological: Does not bruise/bleed easily.   Psychiatric/Behavioral: Negative for behavioral problems and confusion.       Physical Exam     Initial Vitals [07/29/17 1039]   BP Pulse Resp Temp SpO2   127/71 73 18 99 °F (37.2 °C) 97 %      MAP       89.67         Physical Exam    Nursing note and vitals reviewed.  Constitutional: Vital signs are normal. He appears well-developed and well-nourished. He is not diaphoretic. He does not appear ill. No distress.   HENT:   Head: Normocephalic and atraumatic.   Right Ear: External ear normal.   Left Ear: External ear normal.   Eyes: Conjunctivae and EOM are normal. Right eye exhibits no discharge. Left eye exhibits no discharge.   Neck: Normal range of motion. Neck supple. No tracheal deviation present.   Cardiovascular: Normal rate, regular rhythm, S1 normal, S2 normal and intact distal pulses. Exam reveals no gallop and no friction rub.    Murmur heard.   Systolic (heard best to right sternal border) murmur is present   Pulmonary/Chest: Breath sounds normal. No respiratory distress. He has no wheezes. He has no rhonchi. He has no rales.   Abdominal: Soft. Bowel sounds are normal. He exhibits distension. He exhibits no mass. There is no tenderness. There is no rebound and no guarding.   Musculoskeletal: Normal range of motion.   Normal range of motion. He exhibits no tenderness. 2+ pitting edema to bilateral lower extremities.   Neurological: He is alert and  oriented to person, place, and time. He has normal strength. No cranial nerve deficit. He exhibits normal muscle tone. Gait normal.   Skin: Skin is warm and dry. Capillary refill takes less than 2 seconds. No rash noted. No pallor.   Psychiatric: He has a normal mood and affect. His speech is normal. Thought content normal.         ED Course   Procedures  Labs Reviewed   COMPREHENSIVE METABOLIC PANEL - Abnormal; Notable for the following:        Result Value    BUN, Bld 29 (*)     Creatinine 1.7 (*)     Albumin 3.2 (*)     Total Bilirubin 3.8 (*)     eGFR if  49 (*)     eGFR if non  42 (*)     All other components within normal limits   CBC W/ AUTO DIFFERENTIAL - Abnormal; Notable for the following:     RBC 2.36 (*)     Hemoglobin 7.6 (*)     Hematocrit 24.8 (*)      (*)     MCH 32.2 (*)     MCHC 30.6 (*)     RDW 14.8 (*)     Gran% 76.7 (*)     Lymph% 13.4 (*)     All other components within normal limits   B-TYPE NATRIURETIC PEPTIDE - Abnormal; Notable for the following:      (*)     All other components within normal limits   URINALYSIS - Abnormal; Notable for the following:     Protein, UA 1+ (*)     Ketones, UA Trace (*)     Bilirubin (UA) 1+ (*)     Occult Blood UA Trace (*)     Nitrite, UA Positive (*)     All other components within normal limits   URINALYSIS MICROSCOPIC - Abnormal; Notable for the following:     Bacteria, UA Few (*)     Hyaline Casts, UA 8 (*)     Granular Casts, UA 2 (*)     All other components within normal limits   TROPONIN I   TYPE & SCREEN   PREPARE RBC SOFT   PREPARE RBC SOFT     EKG Readings: (Independently Interpreted)   NSR at 72 bpm. LAD. LVH. Baseline motion artifact present. Non-specific T wave changes.        X-Rays:   Independently Interpreted Readings:   Chest X-Ray: Normal heart size. Left lower lobe pneumonia.      Imaging Results          X-Ray Chest PA And Lateral (Final result)  Result time 07/29/17 12:04:48    Final result  by Rio Rivera MD (07/29/17 12:04:48)                 Impression:        Minimal increased opacity identified in the LEFT lung base worrisome for early pneumonia.  Correlation and followup advised       Electronically signed by: Dr. Rio Rivera MD  Date:     07/29/17  Time:    12:04              Narrative:    TWO VIEW CHEST:     Comparison: None.    Findings:    The cardiac silhouette and the pulmonary vasculature are normal in size.  The mediastinal and hilar contours are unremarkable.  There is no pneumothorax.  No pleural effusion.  Minimal increased opacity identified LEFT lung base, worrisome for early pneumonia.  No acute bony abnormality.                              Medical Decision Making:   Initial Assessment:   Pt presents with fatigue and an episode of CP. Has a history of metastatic carcinoid tumor. He is at risk for pneumonia and PE based on the hx provided. He also has a murmer and abdominal distention raising concern for PE. I will check his electrolytes, LFTs, and renal function. He will likely need a CT of chest and abdomen as recommended by PCP in office visit today. I will judiciously give IV fluids. I will reassess.    12:32 PM - review of patient's medical records reveals 2015 was last seen by his oncologist.  He self discontinued chemotherapy due to side effects of treatment.  He was noted to have metastasis into the bone and liver at that time.  His ongoing fatigue is likely due to continued spread of his cancer.  His chest x-ray today is consistent with pneumonia which matches his overall presentation at this time.  He has a normal SPO2 and heart rate with nonpleuritic chest pain leading me less concerned for a pulmonary embolus.  I do not think a CTA is indicated based on the above findings.  Additionally patient's history matches the abdominal swelling on his exam.  He has no abdominal pain at this time. If his labs are unremarkable, I do not feel that further imaging is  warranted today but could be performed as an outpatient he plans to reinitiate cancer treatment. I will discuss these findings with the patient to determine further plans of care.    12:52 PM - I discussed the results with the patient.  He says he would like to receive treatment today for the pneumonia and have further imaging done by his PCP.  His wife notes that patient's birthday celebration is having this weekend.  I will reassess him after IV fluids and antibiotics.    14:00 - labs reveal Hb of 7.8. Patient denies bleeding. I have concerned that his anemia is related to his cancer. This is also likely contributing to his fatigue. Patient would prefer to go home, but he is agreeable to a blood transfusion prior to discharge. I have explained to him and his wife the risks, benefits, and alternatives to treatment. They have given verbal and written consent.     7:12 PM - Pt feeling better after transfusion and ready to go home. I have discussed with patient the diagnostic results, diagnosis, treatment plan, and need for follow-up. Patient has expressed understanding of my instructions. I am comfortable with his discharge home at this time.      Clinical Tests:   Lab Tests: Ordered and Reviewed  Radiological Study: Ordered and Reviewed  Medical Tests: Ordered and Reviewed    Additional MDM:   EKG: I have independently interpreted EKG(s) - see notes.   X-Rays: I have independently interpreted X-Ray(s) - see notes.          Scribe Attestation:   Scribe #1: I performed the above scribed service and the documentation accurately describes the services I performed. I attest to the accuracy of the note.    Attending Attestation:           Physician Attestation for Scribe:  Physician Attestation Statement for Scribe #1: I, Dr. Gordon, reviewed documentation, as scribed by Sheryl Carbajal in my presence, and it is both accurate and complete.                 ED Course     Clinical Impression:     1. Pneumonia of left lower  lobe due to infectious organism    2. Chest congestion    3. Anemia, unspecified type                                 Kyara Gordon MD  07/29/17 2057

## 2017-07-29 NOTE — PROGRESS NOTES
A 61-year-old black male came in with his wife with a 2-day history of decreased   energy and fever to 103.  He developed a dry cough and on the evening prior to   this had a period of chest pain that was intense achy feeling for about 30   minutes in the anterior left chest.  The pain did not change with respiration or   position.    The patient went out in the heat for a few hours in the yard the day before this   began.  The patient also has a history of a malignant carcinoid tumor of the   rectum that is apparently felt to metastasize to both bone and liver previously.    He has noticed a change in his abdominal size and has felt uncomfortable,   recently was seen by Urology for what was felt to be prostatism.    PHYSICAL EXAMINATION:  VITAL SIGNS:  The patient's temperature is now normal.  Blood pressure is   117/63.  SKIN:  Showed adequate hydration without rash.  CHEST:  He has what sounds like rales or rub on the anterior left chest.  CARDIAC:  The heart tones in the upper portion and the lower part of the chest   as he is lying down, there was a raspiness which probably represents a pleural   pericardial rub.  I do not hear a murmur elsewhere and it does not really sound   like murmur.  Rhythm is regular.  ABDOMEN:  Protuberant and is nearly completely filled with a mass that probably   is a very large liver extending all the way into his right lower quadrant, but   it is not tender, has a rounded edge.  Bowel sounds are present.    IMPRESSION:  1.  Current illness very likely is a respiratory infection or process involving   his chest with what sounds like it might be pleurisy on the left side.  The   patient was referred to the Emergency Room.  I spoke with doctor working there   about the details of this situation.  2.  Huge abdominal mass, likely liver.  3.  Recent heat exposure, the illness in another setting could be just from   that, but it is unlikely that is how it is.  4.  Chest pain, likely  related to the pleural pericardial process.  He was sent   to [facility] Emergency Room at his request.      GERMAN  dd: 07/29/2017 13:28:24 (CDT)  td: 07/29/2017 14:42:29 (CDT)  Doc ID   #[id number]  Job ID #[id number]    CC:

## 2017-07-29 NOTE — ED NOTES
"Pt to ED c/o chest congestion and cough x 2 days. Pt was seen by Dr Knott this morning and was told to be seen in ED to r/o phneumonia. Pt reports was outside Thursday when started to feel weak. Pt reports has had dry, nonproductive cough since. Pt denies Cp, SOB, pain, N/V at this time. Pt reports feeling "sluggish" and weak. Pt AAOx4 and appropriate at this time. Respirations even and unlabored. No acute distress noted. Awaiting further orders. Pt updated on POC. Call bell within reach and pt oriented to use of call bell. Pt on continuous pulse ox and continuous BP cuff. Will continue to monitor.     "

## 2017-07-29 NOTE — ED NOTES
Two patient identifiers have been checked and are correct.      Appearance: Pt awake, alert & oriented to person, place & time. Pt in no acute distress at present time. Pt is clean and well groomed with clothes appropriately fastened.   Skin: Skin warm, dry & intact. Color consistent with ethnicity. No breakdown or brusing noted. Pt reports fever at home 100.4-100.5  Musculoskeletal: Patient moving all extremities well, no obvious swelling or deformities noted. Pt reports fatigue.  Respiratory: Respirations spontaneous, even, and non-labored. Visible chest rise noted. Airway is open and patent. No accessory muscle use noted. Pt denies SOB. Breath sounds in LLL and RLL diminshed. Pt reports dry, nonproductive cough  Neurologic: Speech is clear and appropriate. Eyes open spontaneously, behavior appropriate to situation, follows commands, facial expression symmetrical, purposeful motor response noted.  Cardiac: All peripheral pulses present. No Bilateral lower extremity edema. Cap refill is <3 seconds.  Abdomen: Abdomen distended-baseline per pt. Denies N/V/D  : Pt reports no dysuria or hematuria.

## 2017-08-05 RX ORDER — METOPROLOL SUCCINATE 200 MG/1
TABLET, EXTENDED RELEASE ORAL
Qty: 90 TABLET | Refills: 0 | Status: SHIPPED | OUTPATIENT
Start: 2017-08-05 | End: 2017-11-13 | Stop reason: SDUPTHER

## 2017-08-21 ENCOUNTER — TELEPHONE (OUTPATIENT)
Dept: UROLOGY | Facility: CLINIC | Age: 62
End: 2017-08-21

## 2017-08-21 RX ORDER — LEVOTHYROXINE SODIUM 75 UG/1
TABLET ORAL
Qty: 90 TABLET | Refills: 0 | Status: SHIPPED | OUTPATIENT
Start: 2017-08-21 | End: 2017-11-22 | Stop reason: SDUPTHER

## 2017-08-21 NOTE — TELEPHONE ENCOUNTER
----- Message from Rosy Shaikh sent at 8/21/2017 10:50 AM CDT -----  Contact: Selene Sandoval (Spouse)  _  1st Request  _  2nd Request  _  3rd Request        Who: RASHAD SANDOVAL [[id number]]    Why: Patient spouse would like a call back to reschedule cystoscopy. Please call     What Number to Call Back: [phone]    When to Expect a call back: (With in 24 hours)

## 2017-08-28 ENCOUNTER — PROCEDURE VISIT (OUTPATIENT)
Dept: UROLOGY | Facility: CLINIC | Age: 62
End: 2017-08-28
Attending: UROLOGY
Payer: MEDICARE

## 2017-08-28 VITALS
DIASTOLIC BLOOD PRESSURE: 82 MMHG | WEIGHT: 173.94 LBS | HEIGHT: 69 IN | HEART RATE: 75 BPM | SYSTOLIC BLOOD PRESSURE: 143 MMHG | BODY MASS INDEX: 25.76 KG/M2

## 2017-08-28 DIAGNOSIS — N40.1 BENIGN NON-NODULAR PROSTATIC HYPERPLASIA WITH LOWER URINARY TRACT SYMPTOMS: ICD-10-CM

## 2017-08-28 LAB
BILIRUB SERPL-MCNC: ABNORMAL MG/DL
BLOOD URINE, POC: ABNORMAL
COLOR, POC UA: ABNORMAL
GLUCOSE UR QL STRIP: ABNORMAL
KETONES UR QL STRIP: ABNORMAL
LEUKOCYTE ESTERASE URINE, POC: ABNORMAL
NITRITE, POC UA: ABNORMAL
PH, POC UA: 6
PROTEIN, POC: ABNORMAL
SPECIFIC GRAVITY, POC UA: 1.01
UROBILINOGEN, POC UA: 1

## 2017-08-28 PROCEDURE — 81002 URINALYSIS NONAUTO W/O SCOPE: CPT | Mod: S$GLB,,, | Performed by: UROLOGY

## 2017-08-28 PROCEDURE — 52000 CYSTOURETHROSCOPY: CPT | Mod: S$GLB,,, | Performed by: UROLOGY

## 2017-08-28 RX ORDER — NIFEDIPINE 30 MG/1
30 TABLET, FILM COATED, EXTENDED RELEASE ORAL DAILY
Qty: 90 TABLET | Refills: 1 | Status: SHIPPED | OUTPATIENT
Start: 2017-08-28 | End: 2017-09-27

## 2017-08-28 RX ORDER — CIPROFLOXACIN 500 MG/1
500 TABLET ORAL
Status: COMPLETED | OUTPATIENT
Start: 2017-08-28 | End: 2017-08-28

## 2017-08-28 RX ORDER — CIPROFLOXACIN 2 MG/ML
400 INJECTION, SOLUTION INTRAVENOUS
Status: CANCELLED | OUTPATIENT
Start: 2017-08-28

## 2017-08-28 RX ORDER — LIDOCAINE HYDROCHLORIDE 20 MG/ML
JELLY TOPICAL
Status: COMPLETED | OUTPATIENT
Start: 2017-08-28 | End: 2017-08-28

## 2017-08-28 RX ORDER — NIFEDIPINE 60 MG/1
TABLET, EXTENDED RELEASE ORAL
Qty: 90 TABLET | Refills: 0 | OUTPATIENT
Start: 2017-08-28

## 2017-08-28 RX ADMIN — CIPROFLOXACIN 500 MG: 500 TABLET ORAL at 11:08

## 2017-08-28 RX ADMIN — LIDOCAINE HYDROCHLORIDE: 20 JELLY TOPICAL at 11:08

## 2017-08-28 NOTE — TELEPHONE ENCOUNTER
Pt has nifedipine of a different strength already on his med list.  Please clarify which of these he is taking

## 2017-08-28 NOTE — PROCEDURES
Cystoscopy  Date/Time: 8/28/2017 10:30 AM  Performed by: MER SIMS  Authorized by: MER SIMS     Consent Done?:  Yes (Written)  Indications: BPH    Position:  Supine  Anesthesia:  Lidocaine jelly  Preparation: Patient was prepped and draped in usual sterile fashion      Scope type:  Flexible cystoscope  External exam normal: Yes    Urethra normal: Yes    Prostate normal: No          Hyperplasia (Small intravesical median lobe, not clearly obstructing, with more signficant lateral lobe obstruction noted) (Trilobar)Bladder neck normal: Bladder neck normal   Bladder normal: Yes (No tumors, lesions, or stones noted.  Normal bilateral UOs.)      Patient tolerance:  Patient tolerated the procedure well with no immediate complications    Impression: BPH w/ SANTAMARIA    Plan:  -- Discussed TURP vs Urolift. He does have intravesical median lobe but it is small and does not appear to be the primary factor in his SANTAMARIA. I explained that urolift would likely provide desired relief but that TURP may be indicated at later date if median lobe continues to grow.  -- He wishes to proceed with Urolift. Will schedule next week.

## 2017-08-28 NOTE — H&P
"Subjective:      Ronnie Sandoval is a 61 y.o. male who returns today regarding his BPH.     At initial visit 2 mos ago he reported frequency, nocturia more than five times a night, straining, urgency and weak stream. His AUASS was 18/4. He had previously failed flomax due to dizziness. He was given trial of myrbetriq at that time.     Today he reports no symptom improvement. He stopped myrbetriq after 1 month. His AUASS is 21/6.    Cysto 08/28/2017 confirmed SANTAMARIA from BPH primarily from lateral lobe hypertrophy.     The following portions of the patient's history were reviewed and updated as appropriate: allergies, current medications, past family history, past medical history, past social history, past surgical history and problem list.     Review of Systems  A comprehensive multipoint review of systems was negative except as otherwise stated in the HPI.     Objective:   Vitals: BP (!) 143/82 (BP Location: Right arm, Patient Position: Sitting, BP Method: Large (Automatic))   Pulse 75   Ht 5' 9" (1.753 m)   Wt 78.9 kg (173 lb 15.1 oz)   BMI 25.69 kg/m²      Physical Exam   General: alert and oriented, no acute distress  Respiratory: Symmetric expansion, non-labored breathing  Skin: normal coloration and turgor, no rashes, no suspicious skin lesions noted  Neuro: no gross deficits  Psych: normal judgment and insight, normal mood/affect and non-anxious     Bladder Scan PVR: 23cc       Lab Review   Urinalysis demonstrates positive for red blood cells        Lab Results   Component Value Date     WBC 5.46 05/30/2017     HGB 8.3 (L) 05/30/2017     HCT 27.6 (L) 05/30/2017     HCT 36 08/05/2013      (H) 05/30/2017      05/30/2017            Lab Results   Component Value Date     CREATININE 1.5 (H) 05/30/2017     BUN 23 05/30/2017            Lab Results   Component Value Date     PSA 0.82 01/11/2016         Assessment and Plan:   1. Benign non-nodular prostatic hyperplasia with lower urinary tract " symptoms  -- Proceed w/ Urolift

## 2017-08-30 NOTE — PLAN OF CARE
08/30/17 1311   ED Admissions Case Approval   ED Admissions Case Approval (!) CM Approved  (OP )

## 2017-08-31 ENCOUNTER — ANESTHESIA EVENT (OUTPATIENT)
Dept: SURGERY | Facility: OTHER | Age: 62
End: 2017-08-31
Payer: MEDICARE

## 2017-08-31 ENCOUNTER — HOSPITAL ENCOUNTER (OUTPATIENT)
Dept: PREADMISSION TESTING | Facility: OTHER | Age: 62
Discharge: HOME | End: 2017-08-31
Attending: UROLOGY
Payer: MEDICARE

## 2017-08-31 VITALS
TEMPERATURE: 99 F | WEIGHT: 175 LBS | OXYGEN SATURATION: 98 % | HEART RATE: 80 BPM | SYSTOLIC BLOOD PRESSURE: 154 MMHG | DIASTOLIC BLOOD PRESSURE: 81 MMHG | RESPIRATION RATE: 16 BRPM | HEIGHT: 69 IN | BODY MASS INDEX: 25.92 KG/M2

## 2017-08-31 RX ORDER — LIDOCAINE HYDROCHLORIDE 10 MG/ML
1 INJECTION, SOLUTION EPIDURAL; INFILTRATION; INTRACAUDAL; PERINEURAL ONCE
Status: CANCELLED | OUTPATIENT
Start: 2017-08-31 | End: 2017-08-31

## 2017-08-31 RX ORDER — SODIUM CHLORIDE, SODIUM LACTATE, POTASSIUM CHLORIDE, CALCIUM CHLORIDE 600; 310; 30; 20 MG/100ML; MG/100ML; MG/100ML; MG/100ML
INJECTION, SOLUTION INTRAVENOUS CONTINUOUS
Status: CANCELLED | OUTPATIENT
Start: 2017-08-31

## 2017-08-31 NOTE — DISCHARGE INSTRUCTIONS
PRE-ADMIT TESTING -  [phone]    [address]  [facility]        OUTPATIENT SURGERY UNIT - [phone]    Your surgery has been scheduled at [facility]. We are pleased to have the opportunity to serve you. For Further Information please call [phone].    On the day of surgery please report to the Information Desk on the 1st floor.    · CONTACT YOUR PHYSICIAN'S OFFICE THE DAY PRIOR TO YOUR SURGERY TO OBTAIN YOUR ARRIVAL TIME.     · The evening before surgery do not eat anything after 9 p.m. ( this includes hard candy, chewing gum and mints).  You may only have GATORADE, POWERADE AND WATER  from 9 p.m. until you leave your home.   DO NOT DRINK ANY LIQUIDS ON THE WAY TO THE HOSPITAL.      SPECIAL MEDICATION INSTRUCTIONS: TAKE medications checked off by the Anesthesiologist on your Medication List.    Angiogram Patients: Take medications as instructed by your physician, including aspirin.     Surgery Patients:    If you take ASPIRIN - Your PHYSICIAN/SURGEON will need to inform you IF/OR when you need to stop taking aspirin prior to your surgery.     Do Not take any medications containing IBUPROFEN.  Do Not Wear any make-up or dark nail polish   (especially eye make-up) to surgery. If you come to surgery with makeup on you will be required to remove the makeup or nail polish.    Do not shave your surgical area at least 5 days prior to your surgery. The surgical prep will be performed at the hospital according to Infection Control regulations.    Leave all valuables at home.   Do Not wear any jewelry or watches, including any metal in body piercings.  Contact Lens must be removed before surgery. Either do not wear the contact lens or bring a case and solution for storage.  Please bring a container for eyeglasses or dentures as required.  Bring any paperwork your physician has provided, such as consent forms,  history and physicals, doctor's orders, etc.   Bring comfortable clothes  that are loose fitting to wear upon discharge. Take into consideration the type of surgery being performed.  Maintain your diet as advised per your physician the day prior to surgery.      Adequate rest the night before surgery is advised.   Park in the Parking lot behind the hospital or in the [facility] across the street from the parking lot. Parking is complimentary.  If you will be discharged the same day as your procedure, please arrange for a responsible adult to drive you home or to accompany you if traveling by taxi.   YOU WILL NOT BE PERMITTED TO DRIVE OR TO LEAVE THE HOSPITAL ALONE AFTER SURGERY.   It is strongly recommended that you arrange for someone to remain with you for the first 24 hrs following your surgery.       Thank you for your cooperation.  The Staff of [facility].        Bathing Instructions                                                                 Please shower the evening before and morning of your procedure with    ANTIBACTERIAL SOAP. ( DIAL, etc )  Concentrate on the surgical area   for at least 3 minutes and rinse completely. Dry off as usual.   Do not use any deodorant, powder, body lotions, perfume, after shave or    cologne.

## 2017-08-31 NOTE — ANESTHESIA PREPROCEDURE EVALUATION
08/31/2017  Ronnie Sandoval is a 62 y.o., male.    Anesthesia Evaluation    I have reviewed the Patient Summary Reports.    I have reviewed the Nursing Notes.   I have reviewed the Medications.     Review of Systems  Anesthesia Hx:  History of prior surgery of interest to airway management or planning: Previous anesthesia: General 2013 node bx with general anesthesia.  Airway issues documented on chart review include mask, easy, laryngeal mask airway used  Denies Family Hx of Anesthesia complications.   Denies Personal Hx of Anesthesia complications.   Social:  Non-Smoker    Hematology/Oncology:         -- Anemia (hct 25): Current/Recent Cancer. 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 Unable to tolerate chemo. Weak and fatigued    No carcinoid syndrome sx's    EENT/Dental:EENT/Dental Normal   Cardiovascular:   Exercise tolerance: poor Hypertension hyperlipidemia    Pulmonary:   Denies Shortness of breath.    Renal/:   Chronic Renal Disease, CRI BPH    Hepatic/GI:   Liver Disease,    Musculoskeletal:  Musculoskeletal Normal    Neurological:  Neurology Normal    Endocrine:   Hypothyroidism    Dermatological:  Skin Normal    Psych:  Psychiatric Normal           Physical Exam  General:  Cachexia    Airway/Jaw/Neck:  Airway Findings: Mallampati: III Improves to II with phonation.  Jaw/Neck Findings:  Neck ROM: Normal ROM      Dental:  Dental Findings: In tact     Abdomen:  Abdomen Findings:  Distention       Mental Status:  Mental Status Findings:  Cooperative, Alert and Oriented         Anesthesia Plan  Type of Anesthesia, risks & benefits discussed:  Anesthesia Type:  general, MAC, spinal  Patient's Preference:   Intra-op Monitoring Plan: standard ASA monitors  Intra-op Monitoring Plan Comments:   Post Op Pain Control Plan:   Post Op Pain Control Plan Comments:   Induction:    Beta  Blocker:         Informed Consent: Patient understands risks and agrees with Anesthesia plan.  Questions answered. Anesthesia consent signed with patient.  ASA Score: 3     Day of Surgery Review of History & Physical:    H&P update referred to the surgeon.     Anesthesia Plan Notes: Discussed with patient multiple options depending on surgical needs, will need to discuss with Dr. Skyler Cummins For Surgery From Anesthesia Perspective.

## 2017-09-05 ENCOUNTER — ANESTHESIA (OUTPATIENT)
Dept: SURGERY | Facility: OTHER | Age: 62
End: 2017-09-05
Payer: MEDICARE

## 2017-09-05 ENCOUNTER — SURGERY (OUTPATIENT)
Age: 62
End: 2017-09-05

## 2017-09-05 ENCOUNTER — HOSPITAL ENCOUNTER (OUTPATIENT)
Facility: OTHER | Age: 62
Discharge: HOME | End: 2017-09-05
Attending: UROLOGY | Admitting: UROLOGY
Payer: MEDICARE

## 2017-09-05 VITALS
WEIGHT: 175 LBS | DIASTOLIC BLOOD PRESSURE: 72 MMHG | OXYGEN SATURATION: 98 % | RESPIRATION RATE: 18 BRPM | SYSTOLIC BLOOD PRESSURE: 158 MMHG | TEMPERATURE: 98 F | HEIGHT: 69 IN | HEART RATE: 70 BPM | BODY MASS INDEX: 25.92 KG/M2

## 2017-09-05 DIAGNOSIS — N40.1 BENIGN NON-NODULAR PROSTATIC HYPERPLASIA WITH LOWER URINARY TRACT SYMPTOMS: ICD-10-CM

## 2017-09-05 PROCEDURE — 71000016 HC POSTOP RECOV ADDL HR: Performed by: UROLOGY

## 2017-09-05 PROCEDURE — L8699 PROSTHETIC IMPLANT NOS: HCPCS | Performed by: UROLOGY

## 2017-09-05 PROCEDURE — 37000009 HC ANESTHESIA EA ADD 15 MINS: Performed by: UROLOGY

## 2017-09-05 PROCEDURE — 37000008 HC ANESTHESIA 1ST 15 MINUTES: Performed by: UROLOGY

## 2017-09-05 PROCEDURE — 25000003 PHARM REV CODE 250: Performed by: ANESTHESIOLOGY

## 2017-09-05 PROCEDURE — 63600175 PHARM REV CODE 636 W HCPCS: Performed by: NURSE ANESTHETIST, CERTIFIED REGISTERED

## 2017-09-05 PROCEDURE — 52441 CYSTO INSJ TRNSPRSTC 1 IMPLT: CPT | Mod: ,,, | Performed by: UROLOGY

## 2017-09-05 PROCEDURE — 52442 CYSTO INS TRNSPRSTC IMPLT EA: CPT | Mod: ,,, | Performed by: UROLOGY

## 2017-09-05 PROCEDURE — 71000015 HC POSTOP RECOV 1ST HR: Performed by: UROLOGY

## 2017-09-05 PROCEDURE — 36000707: Performed by: UROLOGY

## 2017-09-05 PROCEDURE — 36000706: Performed by: UROLOGY

## 2017-09-05 DEVICE — SYS UROLIFT: Type: IMPLANTABLE DEVICE | Site: PROSTATE | Status: FUNCTIONAL

## 2017-09-05 RX ORDER — PROPOFOL 10 MG/ML
VIAL (ML) INTRAVENOUS
Status: DISCONTINUED | OUTPATIENT
Start: 2017-09-05 | End: 2017-09-05

## 2017-09-05 RX ORDER — LIDOCAINE HYDROCHLORIDE 10 MG/ML
1 INJECTION, SOLUTION EPIDURAL; INFILTRATION; INTRACAUDAL; PERINEURAL ONCE
Status: DISCONTINUED | OUTPATIENT
Start: 2017-09-05 | End: 2017-09-05 | Stop reason: HOSPADM

## 2017-09-05 RX ORDER — SODIUM CHLORIDE 0.9 % (FLUSH) 0.9 %
3 SYRINGE (ML) INJECTION
Status: DISCONTINUED | OUTPATIENT
Start: 2017-09-05 | End: 2017-09-05 | Stop reason: HOSPADM

## 2017-09-05 RX ORDER — HYDROCODONE BITARTRATE AND ACETAMINOPHEN 5; 325 MG/1; MG/1
1 TABLET ORAL EVERY 4 HOURS PRN
Status: DISCONTINUED | OUTPATIENT
Start: 2017-09-05 | End: 2017-09-05 | Stop reason: HOSPADM

## 2017-09-05 RX ORDER — FENTANYL CITRATE 50 UG/ML
25 INJECTION, SOLUTION INTRAMUSCULAR; INTRAVENOUS EVERY 5 MIN PRN
Status: DISCONTINUED | OUTPATIENT
Start: 2017-09-05 | End: 2017-09-05 | Stop reason: HOSPADM

## 2017-09-05 RX ORDER — MEPERIDINE HYDROCHLORIDE 50 MG/ML
12.5 INJECTION INTRAMUSCULAR; INTRAVENOUS; SUBCUTANEOUS ONCE AS NEEDED
Status: DISCONTINUED | OUTPATIENT
Start: 2017-09-05 | End: 2017-09-05 | Stop reason: HOSPADM

## 2017-09-05 RX ORDER — HYDROMORPHONE HYDROCHLORIDE 2 MG/ML
0.4 INJECTION, SOLUTION INTRAMUSCULAR; INTRAVENOUS; SUBCUTANEOUS EVERY 5 MIN PRN
Status: DISCONTINUED | OUTPATIENT
Start: 2017-09-05 | End: 2017-09-05 | Stop reason: HOSPADM

## 2017-09-05 RX ORDER — ONDANSETRON 2 MG/ML
4 INJECTION INTRAMUSCULAR; INTRAVENOUS EVERY 12 HOURS PRN
Status: DISCONTINUED | OUTPATIENT
Start: 2017-09-05 | End: 2017-09-05 | Stop reason: HOSPADM

## 2017-09-05 RX ORDER — SODIUM CHLORIDE, SODIUM LACTATE, POTASSIUM CHLORIDE, CALCIUM CHLORIDE 600; 310; 30; 20 MG/100ML; MG/100ML; MG/100ML; MG/100ML
INJECTION, SOLUTION INTRAVENOUS CONTINUOUS
Status: DISCONTINUED | OUTPATIENT
Start: 2017-09-05 | End: 2017-09-05 | Stop reason: HOSPADM

## 2017-09-05 RX ORDER — PHENAZOPYRIDINE HYDROCHLORIDE 100 MG/1
200 TABLET, FILM COATED ORAL EVERY 8 HOURS PRN
Qty: 20 TABLET | Refills: 0 | Status: SHIPPED | OUTPATIENT
Start: 2017-09-05 | End: 2017-09-25

## 2017-09-05 RX ORDER — HYDROCODONE BITARTRATE AND ACETAMINOPHEN 5; 325 MG/1; MG/1
1 TABLET ORAL EVERY 6 HOURS PRN
Qty: 12 TABLET | Refills: 0 | Status: ON HOLD | OUTPATIENT
Start: 2017-09-05 | End: 2018-09-06

## 2017-09-05 RX ORDER — CIPROFLOXACIN 2 MG/ML
400 INJECTION, SOLUTION INTRAVENOUS
Status: DISCONTINUED | OUTPATIENT
Start: 2017-09-05 | End: 2017-09-05 | Stop reason: HOSPADM

## 2017-09-05 RX ORDER — OXYCODONE HYDROCHLORIDE 5 MG/1
5 TABLET ORAL
Status: DISCONTINUED | OUTPATIENT
Start: 2017-09-05 | End: 2017-09-05 | Stop reason: HOSPADM

## 2017-09-05 RX ORDER — CIPROFLOXACIN 500 MG/1
500 TABLET ORAL 2 TIMES DAILY
Qty: 6 TABLET | Refills: 0 | Status: SHIPPED | OUTPATIENT
Start: 2017-09-05 | End: 2017-09-08

## 2017-09-05 RX ORDER — MIDAZOLAM HYDROCHLORIDE 1 MG/ML
INJECTION INTRAMUSCULAR; INTRAVENOUS
Status: DISCONTINUED | OUTPATIENT
Start: 2017-09-05 | End: 2017-09-05

## 2017-09-05 RX ORDER — HYDROMORPHONE HYDROCHLORIDE 2 MG/ML
1 INJECTION, SOLUTION INTRAMUSCULAR; INTRAVENOUS; SUBCUTANEOUS EVERY 4 HOURS PRN
Status: DISCONTINUED | OUTPATIENT
Start: 2017-09-05 | End: 2017-09-05 | Stop reason: HOSPADM

## 2017-09-05 RX ORDER — LIDOCAINE HCL/PF 100 MG/5ML
SYRINGE (ML) INTRAVENOUS
Status: DISCONTINUED | OUTPATIENT
Start: 2017-09-05 | End: 2017-09-05

## 2017-09-05 RX ORDER — PROPOFOL 10 MG/ML
VIAL (ML) INTRAVENOUS CONTINUOUS PRN
Status: DISCONTINUED | OUTPATIENT
Start: 2017-09-05 | End: 2017-09-05

## 2017-09-05 RX ORDER — FENTANYL CITRATE 50 UG/ML
INJECTION, SOLUTION INTRAMUSCULAR; INTRAVENOUS
Status: DISCONTINUED | OUTPATIENT
Start: 2017-09-05 | End: 2017-09-05

## 2017-09-05 RX ADMIN — PROPOFOL 40 MG: 10 INJECTION, EMULSION INTRAVENOUS at 12:09

## 2017-09-05 RX ADMIN — MIDAZOLAM HYDROCHLORIDE 2 MG: 1 INJECTION, SOLUTION INTRAMUSCULAR; INTRAVENOUS at 11:09

## 2017-09-05 RX ADMIN — PROPOFOL 50 MCG/KG/MIN: 10 INJECTION, EMULSION INTRAVENOUS at 11:09

## 2017-09-05 RX ADMIN — FENTANYL CITRATE 50 MCG: 50 INJECTION, SOLUTION INTRAMUSCULAR; INTRAVENOUS at 12:09

## 2017-09-05 RX ADMIN — SODIUM CHLORIDE, SODIUM LACTATE, POTASSIUM CHLORIDE, AND CALCIUM CHLORIDE: 600; 310; 30; 20 INJECTION, SOLUTION INTRAVENOUS at 10:09

## 2017-09-05 RX ADMIN — LIDOCAINE HYDROCHLORIDE 100 MG: 20 INJECTION, SOLUTION INTRAVENOUS at 11:09

## 2017-09-05 RX ADMIN — PROPOFOL 30 MG: 10 INJECTION, EMULSION INTRAVENOUS at 12:09

## 2017-09-05 RX ADMIN — FENTANYL CITRATE 50 MCG: 50 INJECTION, SOLUTION INTRAMUSCULAR; INTRAVENOUS at 11:09

## 2017-09-05 NOTE — PLAN OF CARE
Patient prefers to have Selene, spouse,  present for discharge teaching. Please contact them @ [phone].

## 2017-09-05 NOTE — ANESTHESIA POSTPROCEDURE EVALUATION
"Anesthesia Post Evaluation    Patient: Ronnie Sandoval    Procedure(s) Performed: Procedure(s) (LRB):  TRANSPROSTATIC TISSUE RETRACTION (N/A)    Final Anesthesia Type: general  Patient location during evaluation: [facility]  Patient participation: Yes- Able to Participate  Level of consciousness: awake and alert and awake  Post-procedure vital signs: reviewed and stable  Pain management: adequate  Airway patency: patent  PONV status at discharge: No PONV  Anesthetic complications: no      Cardiovascular status: blood pressure returned to baseline, hemodynamically stable and stable  Respiratory status: unassisted, spontaneous ventilation and room air  Hydration status: euvolemic  Follow-up not needed.        Visit Vitals  BP (!) 160/85   Pulse 71   Temp 36.3 °C (97.4 °F) (Oral)   Resp 16   Ht 5' 9" (1.753 m)   Wt 79.4 kg (175 lb)   SpO2 100%   BMI 25.84 kg/m²       Pain/Vanessa Score: Pain Assessment Performed: Yes (9/5/2017 10:03 AM)  Presence of Pain: denies (9/5/2017 10:03 AM)      "

## 2017-09-05 NOTE — OP NOTE
Operative Note       Surgery Date: 9/5/2017     Surgeon: Paul Holland MD    Assistant Surgeon: None    Pre-op Diagnosis:  Benign non-nodular prostatic hyperplasia with lower urinary tract symptoms [N40.1]    Post-op Diagnosis: Post-Op Diagnosis Codes:     * Benign non-nodular prostatic hyperplasia with lower urinary tract symptoms [N40.1]    Procedures:  Procedure(s) (LRB):  TRANSPROSTATIC TISSUE RETRACTION (N/A) - UROLIFT    Anesthesia: General    Indications for Procedure:  The patient is a 62 y.o. year old male with BPH and SANTAMARIA.  He presents today for surgical treatment with Urolift.  The full risks and benefits of the procedure have been explained and detail and he wishes to proceed.    Procedure Description:  The patient was brought to the operative suite and placed under general anesthesia. He was placed in lithotomy position and prepped and draped in usual sterile fashion.  Time out was performed prior to starting the procedure.    A 20F cystoscope was inserted into the bladder. The cystoscopy bridge was replaced with a UroLift delivery device. The first treatment site was the patient's left side approximately 1.5cm distal to the bladder neck. The distal tip of the delivery device was then angled laterally approximately 20 degrees at this position to compress the lateral lobe. The trigger was pulled, thereby deploying a needle containing the implant through the prostate. The needle was then retracted, allowing one end of the implant to be delivered to the capsular surface of the prostate. The implant was then tensioned to assure capsular seating and removal of slack monofilament. The device was then angled back toward midline and slowly advanced proximally (typically 3 to 4 mm) until cystoscopic verification of the monofilament being centered in the delivery bay. The urethral end piece was then affixed to the monofilament thereby tailoring the size of the implant. Excess filament was then severed. The  delivery device was then re-advanced into the bladder. The delivery device was then replaced and the same procedure was then repeated on the right side. Initial placement attempt unsuccessful on right due to capsular tab pull through; 2nd placement successful.     The delivery device was then replaced with cystoscope and bridge and the implant location and opening effect was confirmed cystoscopically. Two additional implants were delivered just proximal to the veru montanum, again one on right and one on left side of the prostate, following the same technique.     Cystoscopy then revealed a persistent obstruction due to cat eye effect at bladder neck. An additional implant was placed 1.5cm distal on bladder neck on left in stacking technique. Similar placement was attempted on right but failed due to bone strike and pull through. I opted not to attempt further placement on this side due to prior pull through.    A total of 7 implants were used but only 5 were successfully placed.     A final cystoscopy was conducted first to inspect the location and state of each implant and second, to confirm the presence of a continuous anterior channel was present through the prostatic urethra with irrigation flow turned off. The bladder was then filled with approximately 150 cc irrigation fluid to assist the patient in void trial after the procedure, and all instruments were removed. The patient did not receive a post-operative urinary catheter.        Complications: No    Estimated Blood Loss: 0cc         Specimens Removed:   Specimen (12h ago through future)    None          Implants:   Implant Name Type Inv. Item Serial No.  Lot No. LRB No. Used   SYSTEM UROLIFT - [id number]  SYSTEM UROLIFT  [employer] [id number] N/A 4   SYSTEM UROLIFT - [id number]  SYSTEM UROLIFT  [employer] [id number] N/A 1   SYSTEM UROLIFT - [id number]  SYSTEM UROLIFT  [employer] [id number] N/A 1   SYSTEM UROLIFT - [id number]   SYSTEM UROLIFT    [employer] [id number] N/A 1              Disposition: PACU - hemodynamically stable.           Condition: Good

## 2017-09-05 NOTE — DISCHARGE INSTRUCTIONS
Anesthesia: Monitored Anesthesia Care (MAC)    Anesthesia Safety  · Have an adult family member or friend drive you home after the procedure.  · For the first 24 hours after your surgery:  ¨ Do not drive or use heavy equipment.  ¨ Do not make important decisions or sign documents.  ¨ Avoid alcohol.  ¨ Have someone stay with you, if possible. They can watch for problems and help keep you safe.    GENERAL EXPECTATIONS    Some men may experience discomfort after the procedure.  You may have soreness in the lower abdomen , and it may   be uncomfortable to sit. You may experience the need to urinate  frequently and with greater urgency. You may have some blood in your   urine, including passing an occasional blood clot. These are all normal  reactions to the procedure. Many of these symptoms will resolve in a week  or two, but some may last up to four weeks- this is normal.    The following are some suggestions:    1. Have someone drive you home after your procedure.    2. Drink plenty of water.    3. Take your medication as prescribed.    4. Avoid strenuous activity for one week.    5. If you have a catheter placed into your bladder , do not take a bath until it is     removed., though you can take a shower.    Contact your physician if you experience any of the following    1. Temperature above 101.5 F ( taken by mouth)  2. Excessive urinary bleeding or bleeding from the penis  3. Continuous bladder spasms  4. Painful, swollen and/or inflated testicle(s) or scrotum  5. Unable to void spontaneously or the indwelling catheter is not draining urine or is      blocked.     IF YOU NEED IMMMEDIATE ATTENTION, GO TO THE HOSPITAL EMERGENCY ROOM FOR TREATMENT- be sure to tell the facility personnel to use a Coude tipped catheter.

## 2017-09-05 NOTE — BRIEF OP NOTE
[facility]  Brief Operative Note     SUMMARY     Surgery Date: 9/5/2017     Surgeon(s) and Role:     * Rafael Holland MD - Primary    Assisting Surgeon: None    Pre-op Diagnosis:  Benign non-nodular prostatic hyperplasia with lower urinary tract symptoms [N40.1]    Post-op Diagnosis:  Post-Op Diagnosis Codes:     * Benign non-nodular prostatic hyperplasia with lower urinary tract symptoms [N40.1]    Procedure(s) (LRB):  TRANSPROSTATIC TISSUE RETRACTION (N/A)    Anesthesia: Choice    Description of the findings of the procedure: 5 implants placed - 3 on left and 2 and right. 2 failed placements near BN on right due to pull through.     Estimated Blood Loss: 0cc         Specimens:   Specimen (12h ago through future)    None          Discharge Note    SUMMARY     Admit Date: 9/5/2017    Discharge Date and Time: 9/5/2017    Hospital Course: Patient was admitted for elective outpatient procedure, which was uncomplicated and well tolerated.      Final Diagnosis: Post-Op Diagnosis Codes:     * Benign non-nodular prostatic hyperplasia with lower urinary tract symptoms [N40.1]    Disposition: Home or Self Care    Follow Up/Patient Instructions:     Medications:  Reconciled Home Medications:   Current Discharge Medication List      START taking these medications    Details   ciprofloxacin HCl (CIPRO) 500 MG tablet Take 1 tablet (500 mg total) by mouth 2 (two) times daily.  Qty: 6 tablet, Refills: 0      hydrocodone-acetaminophen 5-325mg (NORCO) 5-325 mg per tablet Take 1 tablet by mouth every 6 (six) hours as needed for Pain.  Qty: 12 tablet, Refills: 0      phenazopyridine (PYRIDIUM) 100 MG tablet Take 2 tablets (200 mg total) by mouth every 8 (eight) hours as needed (burning with urination).  Qty: 20 tablet, Refills: 0         CONTINUE these medications which have NOT CHANGED    Details   KLOR-CON M20 20 mEq tablet TAKE ONE TABLET BY MOUTH TWICE DAILY  Qty: 180 tablet, Refills: 2      levothyroxine  (SYNTHROID) 75 MCG tablet TAKE ONE TABLET BY MOUTH ONCE DAILY BEFORE  BREAKFAST  Qty: 90 tablet, Refills: 0      losartan (COZAAR) 100 MG tablet Take 1 tablet (100 mg total) by mouth once daily.  Qty: 90 tablet, Refills: 1      metoprolol succinate (TOPROL-XL) 200 MG 24 hr tablet TAKE ONE TABLET BY MOUTH ONCE DAILY  Qty: 90 tablet, Refills: 0      nifedipine (ADALAT CC) 30 MG TbSR Take 1 tablet (30 mg total) by mouth once daily.  Qty: 90 tablet, Refills: 1      tramadol (ULTRAM) 50 mg tablet Take 1 tablet (50 mg total) by mouth every 8 (eight) hours as needed for Pain.  Qty: 90 tablet, Refills: 0             Discharge Procedure Orders  Diet general     Activity as tolerated       Follow-up Information     Rafael Holland MD. Schedule an appointment as soon as possible for a visit in 3 weeks.    Specialty:  Urology  Why:  For post-operative follow-up  Contact information:  [address] [id number] [phone]

## 2017-09-06 NOTE — OR NURSING
Call placed to Dr Holland, spoke with Eli, aware that patient is bleeding from penis when he stands up.

## 2017-09-22 RX ORDER — FUROSEMIDE 40 MG/1
40 TABLET ORAL 2 TIMES DAILY
Qty: 90 TABLET | Refills: 1 | Status: SHIPPED | OUTPATIENT
Start: 2017-09-22 | End: 2017-09-27 | Stop reason: SDUPTHER

## 2017-09-25 ENCOUNTER — OFFICE VISIT (OUTPATIENT)
Dept: UROLOGY | Facility: CLINIC | Age: 62
End: 2017-09-25
Attending: UROLOGY
Payer: MEDICARE

## 2017-09-25 VITALS
BODY MASS INDEX: 25.31 KG/M2 | SYSTOLIC BLOOD PRESSURE: 138 MMHG | WEIGHT: 170.88 LBS | HEIGHT: 69 IN | DIASTOLIC BLOOD PRESSURE: 81 MMHG | HEART RATE: 78 BPM

## 2017-09-25 DIAGNOSIS — N40.1 BENIGN NON-NODULAR PROSTATIC HYPERPLASIA WITH LOWER URINARY TRACT SYMPTOMS: Primary | ICD-10-CM

## 2017-09-25 LAB
BILIRUB SERPL-MCNC: ABNORMAL MG/DL
BLOOD URINE, POC: 250
COLOR, POC UA: YELLOW
GLUCOSE UR QL STRIP: ABNORMAL
KETONES UR QL STRIP: ABNORMAL
LEUKOCYTE ESTERASE URINE, POC: ABNORMAL
NITRITE, POC UA: ABNORMAL
PH, POC UA: 6
POC RESIDUAL URINE VOLUME: 15 ML (ref 0–100)
PROTEIN, POC: ABNORMAL
SPECIFIC GRAVITY, POC UA: 1
UROBILINOGEN, POC UA: ABNORMAL

## 2017-09-25 PROCEDURE — 87086 URINE CULTURE/COLONY COUNT: CPT

## 2017-09-25 PROCEDURE — 3075F SYST BP GE 130 - 139MM HG: CPT | Mod: S$GLB,,, | Performed by: UROLOGY

## 2017-09-25 PROCEDURE — 51798 US URINE CAPACITY MEASURE: CPT | Mod: S$GLB,,, | Performed by: UROLOGY

## 2017-09-25 PROCEDURE — 81002 URINALYSIS NONAUTO W/O SCOPE: CPT | Mod: S$GLB,,, | Performed by: UROLOGY

## 2017-09-25 PROCEDURE — 99213 OFFICE O/P EST LOW 20 MIN: CPT | Mod: 25,S$GLB,, | Performed by: UROLOGY

## 2017-09-25 PROCEDURE — 3008F BODY MASS INDEX DOCD: CPT | Mod: S$GLB,,, | Performed by: UROLOGY

## 2017-09-25 PROCEDURE — 3079F DIAST BP 80-89 MM HG: CPT | Mod: S$GLB,,, | Performed by: UROLOGY

## 2017-09-25 NOTE — PROGRESS NOTES
"Subjective:      Ronnie Sandoval is a 62 y.o. male who returns today regarding his BPH.    He is 3 weeks s/p Urolift. His preop AUASS was 21/6 and had failed flomax (side effects) and myrbetriq.    Today he reports persistent symptoms however he has since started lasix 40mg twice daily. His AUASS today is 25/5.     The following portions of the patient's history were reviewed and updated as appropriate: allergies, current medications, past family history, past medical history, past social history, past surgical history and problem list.    Review of Systems  A comprehensive multipoint review of systems was negative except as otherwise stated in the HPI.     Objective:   Vitals: /81 (BP Location: Left arm, Patient Position: Sitting, BP Method: Large (Automatic))   Pulse 78   Ht 5' 9" (1.753 m)   Wt 77.5 kg (170 lb 13.7 oz)   BMI 25.23 kg/m²     Physical Exam   General: alert and oriented, no acute distress  Respiratory: Symmetric expansion, non-labored breathing  Neuro: no gross deficits  Psych: normal judgment and insight, normal mood/affect and non-anxious    Bladder Scan PVR: 15cc      Lab Review   Urinalysis demonstrates negative for all components  Lab Results   Component Value Date    WBC 8.16 07/29/2017    HGB 7.6 (L) 07/29/2017    HCT 24.8 (L) 07/29/2017    HCT 36 08/05/2013     (H) 07/29/2017     07/29/2017     Lab Results   Component Value Date    CREATININE 1.7 (H) 07/29/2017    BUN 29 (H) 07/29/2017     Lab Results   Component Value Date    PSA 0.82 01/11/2016       Assessment and Plan:   1. Benign non-nodular prostatic hyperplasia with lower urinary tract symptoms  -- Symptom persistence despite surgery, likely 2/2 new lasix and possible UTI. Reassured he may also have lingering symptoms from surgery, which should improve w/ time.  -- Urine culture today - treat as indicated  -- FU 3 mos  "

## 2017-09-26 LAB — BACTERIA UR CULT: NO GROWTH

## 2017-09-27 ENCOUNTER — TELEPHONE (OUTPATIENT)
Dept: NEUROLOGY | Facility: HOSPITAL | Age: 62
End: 2017-09-27

## 2017-09-27 ENCOUNTER — OFFICE VISIT (OUTPATIENT)
Dept: INTERNAL MEDICINE | Facility: CLINIC | Age: 62
End: 2017-09-27
Attending: INTERNAL MEDICINE
Payer: MEDICARE

## 2017-09-27 ENCOUNTER — HOSPITAL ENCOUNTER (OUTPATIENT)
Dept: RADIOLOGY | Facility: OTHER | Age: 62
Discharge: HOME | End: 2017-09-27
Attending: INTERNAL MEDICINE
Payer: MEDICARE

## 2017-09-27 VITALS
HEART RATE: 81 BPM | BODY MASS INDEX: 26.22 KG/M2 | WEIGHT: 177 LBS | SYSTOLIC BLOOD PRESSURE: 118 MMHG | HEIGHT: 69 IN | DIASTOLIC BLOOD PRESSURE: 70 MMHG

## 2017-09-27 DIAGNOSIS — C7B.8 SECONDARY NEUROENDOCRINE TUMOR OF LIVER: Primary | ICD-10-CM

## 2017-09-27 DIAGNOSIS — E03.9 HYPOTHYROIDISM, UNSPECIFIED TYPE: ICD-10-CM

## 2017-09-27 DIAGNOSIS — R60.0 BILATERAL LEG EDEMA: ICD-10-CM

## 2017-09-27 DIAGNOSIS — R19.00 ABDOMINAL SWELLING: ICD-10-CM

## 2017-09-27 DIAGNOSIS — D64.9 ANEMIA, UNSPECIFIED TYPE: Primary | ICD-10-CM

## 2017-09-27 PROCEDURE — 93970 EXTREMITY STUDY: CPT | Mod: TC

## 2017-09-27 PROCEDURE — 99499 UNLISTED E&M SERVICE: CPT | Mod: S$PBB,,, | Performed by: INTERNAL MEDICINE

## 2017-09-27 PROCEDURE — 3008F BODY MASS INDEX DOCD: CPT | Mod: S$GLB,,, | Performed by: INTERNAL MEDICINE

## 2017-09-27 PROCEDURE — 93970 EXTREMITY STUDY: CPT | Mod: 26,,, | Performed by: INTERNAL MEDICINE

## 2017-09-27 PROCEDURE — 99214 OFFICE O/P EST MOD 30 MIN: CPT | Mod: S$GLB,,, | Performed by: INTERNAL MEDICINE

## 2017-09-27 PROCEDURE — 99999 PR PBB SHADOW E&M-EST. PATIENT-LVL III: CPT | Mod: PBBFAC,,, | Performed by: INTERNAL MEDICINE

## 2017-09-27 PROCEDURE — 3074F SYST BP LT 130 MM HG: CPT | Mod: S$GLB,,, | Performed by: INTERNAL MEDICINE

## 2017-09-27 PROCEDURE — 3078F DIAST BP <80 MM HG: CPT | Mod: S$GLB,,, | Performed by: INTERNAL MEDICINE

## 2017-09-27 RX ORDER — ENOXAPARIN SODIUM 100 MG/ML
1 INJECTION SUBCUTANEOUS 2 TIMES DAILY
Qty: 48 ML | Refills: 2 | Status: SHIPPED | OUTPATIENT
Start: 2017-09-27 | End: 2017-12-26

## 2017-09-27 RX ORDER — FUROSEMIDE 40 MG/1
40 TABLET ORAL 2 TIMES DAILY
Qty: 180 TABLET | Refills: 1 | Status: SHIPPED | OUTPATIENT
Start: 2017-09-27 | End: 2018-01-22 | Stop reason: SDUPTHER

## 2017-09-27 NOTE — PROGRESS NOTES
"Subjective:       Patient ID: Ronnie Sandoval is a 62 y.o. male.    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 He reports heaviness of legs and difficulty with ambulation due to this. He denies focal calf pain, SOB, CHUNG, PND, orthopnea. His abdomen continues to swell and he is unable to lie on his stomach as it is hard to breathe. Has been on amlodipine 10mg. I started him on lasix 40mg BID 1 week prior when he stopped by office with concern about leg swelling. Aside from leg heaviness and generalized fatigue he is without pain and is comfortable.         Review of Systems    Objective:      Vitals:    09/27/17 0813   BP: 118/70   Pulse: 81   Weight: 80.3 kg (177 lb 0.5 oz)   Height: 5' 9" (1.753 m)      Physical Exam   Constitutional: He is oriented to person, place, and time. He appears well-developed and well-nourished. No distress.   HENT:   Head: Normocephalic and atraumatic.   Mouth/Throat: Oropharynx is clear and moist. No oropharyngeal exudate.   Eyes: Conjunctivae and EOM are normal. Pupils are equal, round, and reactive to light. No scleral icterus.   Neck: No thyromegaly present.   Cardiovascular: Normal rate, regular rhythm and normal heart sounds.    No murmur heard.  Pulmonary/Chest: Effort normal and breath sounds normal. He has no wheezes. He has no rales.   Abdominal: Soft. He exhibits mass. He exhibits no distension. Bowel sounds are decreased. There is no tenderness. There is no guarding.   Musculoskeletal: He exhibits no edema or tenderness.   Lymphadenopathy:     He has no cervical adenopathy.   Neurological: He is alert and oriented to person, place, and time.   Skin: Skin is warm and dry.   Psychiatric: He has a normal mood and " "affect. His behavior is normal.       Assessment:       1. Anemia, unspecified type    2. Abdominal swelling    3. Bilateral leg edema    4. Hypothyroidism, unspecified type        Plan:       Ronnie was seen today for leg swelling and foot swelling.    Diagnoses and all orders for this visit:    Anemia, unspecified type  -     CBC auto differential; Future    Abdominal swelling  -worsening abdominal mass. No ascites appreciated    Bilateral leg edema  Increase lasix to 80mg BID. Symptoms of dehydration discussed. Office/ED prompts discussed.  -     Brain natriuretic peptide; Future  -     TSH; Future  -     US Lower Extremity Veins Bilateral; Future  -     US Lower Extremity Veins Bilateral; Future    Hypothyroidism, unspecified type  -     TSH; Future  -     furosemide (LASIX) 40 MG tablet; Take 1 tablet (40 mg total) by mouth 2 (two) times daily.  -     enoxaparin (LOVENOX) 100 mg/mL Syrg; Inject 0.8 mLs (80 mg total) into the skin 2 (two) times daily.  -     needle, disp, 25 gauge (BD REGULAR BEVEL NEEDLES) 25 gauge x 5/8" Ndle; Pt to inject BID    ADDENDUM  Contacted by radiology dept with DVT. Initially my reaction was to send to ED but decided patient is quite stable and he and his wife are comfortable performing self injections. Start lovenox BID due to active malignancy. Will have pt see his heme/onc MD Dr. Hernandez to see if OAC is an acceptable option for him despite malignancy as cause. Discussed potential SE of blood thinners. Pt was on ladder yesterday trimming a tree. I recommend he curtail activites this risky. He voiced understanding.             Side effects of medication(s) were discussed in detail and patient voiced understanding.  Patient will call back for any issues or complications.   "

## 2017-09-27 NOTE — TELEPHONE ENCOUNTER
----- Message from Deng Pierre sent at 9/27/2017  4:24 PM CDT -----  Contact: Medical Assistant   Hello, please advise on f/u appt.  Last office visit 2015.  ----- Message -----  From: Alexandro Trimble  Sent: 9/27/2017  12:31 PM  To: Deng Pierre    Will like to schedule a f/u appt w/ Dr. Hernandez    Contact::[phone]

## 2017-09-28 NOTE — TELEPHONE ENCOUNTER
----- Message from Lilly Echeverria sent at 9/28/2017  1:41 PM CDT -----  Contact: Pt  Pt is calling to get the labs scheduled     Pt can be reached at [phone] thanks

## 2017-10-02 ENCOUNTER — LAB VISIT (OUTPATIENT)
Dept: LAB | Facility: HOSPITAL | Age: 62
End: 2017-10-02
Attending: INTERNAL MEDICINE
Payer: MEDICARE

## 2017-10-02 ENCOUNTER — OFFICE VISIT (OUTPATIENT)
Dept: HEMATOLOGY/ONCOLOGY | Facility: CLINIC | Age: 62
End: 2017-10-02
Attending: INTERNAL MEDICINE
Payer: MEDICARE

## 2017-10-02 VITALS
SYSTOLIC BLOOD PRESSURE: 142 MMHG | WEIGHT: 164.88 LBS | BODY MASS INDEX: 24.35 KG/M2 | HEART RATE: 90 BPM | TEMPERATURE: 99 F | DIASTOLIC BLOOD PRESSURE: 73 MMHG | OXYGEN SATURATION: 100 %

## 2017-10-02 DIAGNOSIS — C7B.8 SECONDARY NEUROENDOCRINE TUMOR OF LIVER: ICD-10-CM

## 2017-10-02 DIAGNOSIS — C79.51 SECONDARY MALIGNANT NEOPLASM OF BONE: ICD-10-CM

## 2017-10-02 DIAGNOSIS — D69.6 THROMBOCYTOPENIA: ICD-10-CM

## 2017-10-02 DIAGNOSIS — I82.412 ACUTE DEEP VEIN THROMBOSIS (DVT) OF FEMORAL VEIN OF LEFT LOWER EXTREMITY: ICD-10-CM

## 2017-10-02 DIAGNOSIS — N18.30 STAGE 3 CHRONIC KIDNEY DISEASE: ICD-10-CM

## 2017-10-02 DIAGNOSIS — D63.0 ANEMIA IN NEOPLASTIC DISEASE: ICD-10-CM

## 2017-10-02 DIAGNOSIS — R17 INCREASED BILIRUBIN LEVEL: ICD-10-CM

## 2017-10-02 DIAGNOSIS — C7A.026 MALIGNANT CARCINOID TUMOR OF RECTUM: Primary | ICD-10-CM

## 2017-10-02 LAB
ALBUMIN SERPL BCP-MCNC: 3.1 G/DL
ALP SERPL-CCNC: 131 U/L
ALT SERPL W/O P-5'-P-CCNC: 31 U/L
ANION GAP SERPL CALC-SCNC: 10 MMOL/L
AST SERPL-CCNC: 63 U/L
BILIRUB SERPL-MCNC: 3.9 MG/DL
BUN SERPL-MCNC: 28 MG/DL
CALCIUM SERPL-MCNC: 9.7 MG/DL
CHLORIDE SERPL-SCNC: 107 MMOL/L
CO2 SERPL-SCNC: 27 MMOL/L
CREAT SERPL-MCNC: 1.8 MG/DL
ERYTHROCYTE [DISTWIDTH] IN BLOOD BY AUTOMATED COUNT: 17.9 %
EST. GFR  (AFRICAN AMERICAN): 45 ML/MIN/1.73 M^2
EST. GFR  (NON AFRICAN AMERICAN): 38.9 ML/MIN/1.73 M^2
GLUCOSE SERPL-MCNC: 106 MG/DL
HCT VFR BLD AUTO: 21.1 %
HGB BLD-MCNC: 6.5 G/DL
MCH RBC QN AUTO: 33.2 PG
MCHC RBC AUTO-ENTMCNC: 30.8 G/DL
MCV RBC AUTO: 108 FL
NEUTROPHILS # BLD AUTO: 5 K/UL
PLATELET # BLD AUTO: 78 K/UL
PMV BLD AUTO: 12.2 FL
POTASSIUM SERPL-SCNC: 4.2 MMOL/L
PROT SERPL-MCNC: 6.7 G/DL
RBC # BLD AUTO: 1.96 M/UL
SODIUM SERPL-SCNC: 144 MMOL/L
WBC # BLD AUTO: 6.94 K/UL

## 2017-10-02 PROCEDURE — 99499 UNLISTED E&M SERVICE: CPT | Mod: S$PBB,,, | Performed by: INTERNAL MEDICINE

## 2017-10-02 PROCEDURE — 3077F SYST BP >= 140 MM HG: CPT | Mod: S$GLB,,, | Performed by: INTERNAL MEDICINE

## 2017-10-02 PROCEDURE — 36415 COLL VENOUS BLD VENIPUNCTURE: CPT

## 2017-10-02 PROCEDURE — 85027 COMPLETE CBC AUTOMATED: CPT

## 2017-10-02 PROCEDURE — 80053 COMPREHEN METABOLIC PANEL: CPT

## 2017-10-02 PROCEDURE — 99999 PR PBB SHADOW E&M-EST. PATIENT-LVL IV: CPT | Mod: PBBFAC,,, | Performed by: INTERNAL MEDICINE

## 2017-10-02 PROCEDURE — 3078F DIAST BP <80 MM HG: CPT | Mod: S$GLB,,, | Performed by: INTERNAL MEDICINE

## 2017-10-02 PROCEDURE — 99215 OFFICE O/P EST HI 40 MIN: CPT | Mod: S$GLB,,, | Performed by: INTERNAL MEDICINE

## 2017-10-02 PROCEDURE — 3008F BODY MASS INDEX DOCD: CPT | Mod: S$GLB,,, | Performed by: INTERNAL MEDICINE

## 2017-10-02 RX ORDER — HYDROCODONE BITARTRATE AND ACETAMINOPHEN 500; 5 MG/1; MG/1
TABLET ORAL ONCE
Status: CANCELLED | OUTPATIENT
Start: 2017-10-02 | End: 2017-10-02

## 2017-10-02 RX ORDER — FUROSEMIDE 10 MG/ML
20 INJECTION INTRAMUSCULAR; INTRAVENOUS
Status: CANCELLED | OUTPATIENT
Start: 2017-10-02

## 2017-10-02 RX ORDER — DIPHENHYDRAMINE HCL 25 MG
25 CAPSULE ORAL
Status: CANCELLED | OUTPATIENT
Start: 2017-10-02

## 2017-10-02 RX ORDER — ACETAMINOPHEN 325 MG/1
650 TABLET ORAL
Status: CANCELLED | OUTPATIENT
Start: 2017-10-02

## 2017-10-02 NOTE — PROGRESS NOTES
NOLANETS:  [facility]  A collaboration between [facility] and [facility]    PATIENT: Ronnie Sandoval  MRN: [id number]  DATE: 10/2/2017      Diagnosis:   1. Malignant carcinoid tumor of rectum    2. Secondary neuroendocrine tumor of liver    3. Secondary malignant neoplasm of bone    4. Acute deep vein thrombosis (DVT) of femoral vein of left lower extremity    5. Increased bilirubin level    6. Anemia in neoplastic disease    7. Stage 3 chronic kidney disease    8. Thrombocytopenia        Chief Complaint: Carcinoid Tumor      Oncologic History:    Oncologic History Diagnosed with neuroendocrine tumor unknown primary in 6/13 with metastatic disease to groin lymph node at presentation;   Rectal neuroendocrine tumor 8/14;   Liver neuroendocrine metastasis in 11/14;   Follicular variant of papillary thyroid carcinoma in 8/14    Oncologic Treatment Subtotal thyroidectomy in 8/14; Declined completion thyroidectomy  Cisplatin/etoposide x 2 cycles 12/14-1/15;   CAPTEM initiated 5/13/15 - Completed 1 cycle (self discontinued due to adverse reactions)    Pathology Low grade, Ki-67=<20%        Subjective:    Interval History: Mr. Sandoval is seen in follow up for a rectal carcinoid tumor.  I last saw him more than 2 years ago.  At that time he declined any further workup or treatment for his disease.  Recently, he was diagnosed with a left lower extremity DVT and placed on therapy with Lovenox.  He states since being placed on therapy he has had worsening abdominal distention, pain, nausea.  He has a decreased appetite.  He has also noted dark stools and generalized fatigue.  He denies any chest pain or shortness of breath.    His history dates to 6/13 when he noted a left groin mass.  He underwent biopsy of this showing a low grade neuroendocrine carcinoma.  He underwent excision of a 6cm groin mass with pathology showing a low grade  neuroendocrine tumor with Ki-67 <20%.  In 8/14 he underwent proctoscopic excision of a 1.9 cm rectal neuroendocrine tumor which is presumably the primary.  In 10/14 he was found to have multiple liver lesions on CT.  Biopsy of liver in 11/14 had shown metastatic neuroendocrine carcinoma.  He was initiated on cisplatin and etoposide in 12/14 and received 2 cycles of treatment with apparent progression of his disease.  He was offered second line topotecan but declined.  Took one cycle of CAPTEM before self discontinuation in 5/2015.  He declined any further therapy.    Past Medical History:   Past Medical History:   Diagnosis Date    Acute deep vein thrombosis (DVT) of femoral vein of left lower extremity 10/2/2017    Anemia     Anemia in neoplastic disease 10/2/2017    Chemotherapy follow-up examination 5/26/2015    Colon polyps     Encounter for blood transfusion     Hx antineoplastic chemotherapy 12/2014-1/2015     16 + Cisplatin    Hyperlipidemia     Hypertension     Increased bilirubin level 10/2/2017    Malignant carcinoid tumor of rectum 5/26/2015    Malignant carcinoid tumor of the rectum     Neuroendocrine carcinoma, unknown primary site 4/14/2015    Papillary thyroid carcinoma 4/14/2015    Secondary malignant neoplasm of bone 4/14/2015    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    left groin mass; Ki 67-20%    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    Secondary neuroendocrine tumor of liver(209.72) 11/2014    Stage 3 chronic kidney disease 10/2/2017    Thrombocytopenia 10/2/2017    Urinary tract infection        Past Surgical HIstory:   Past Surgical History:   Procedure Laterality Date    COLONOSCOPY      excision of mass      left mastoid    LIVER BIOPSY  11/14    Mass in grion excised  8/2013    Carcinoid    Mass in groin excised  6/2013    Carcinoid    THYROIDECTOMY  2014    [place] - Carcinoid       Family History:   Family History   Problem  "Relation Age of Onset    Leukemia Father     Cancer Father      leukemia    Stroke Sister     Cancer Brother      colon       Social History:  reports that he has never smoked. He has never used smokeless tobacco. He reports that he does not drink alcohol or use drugs.    Allergies:  No Known Allergies    Medications:  Current Outpatient Prescriptions   Medication Sig Dispense Refill    enoxaparin (LOVENOX) 100 mg/mL Syrg Inject 0.8 mLs (80 mg total) into the skin 2 (two) times daily. 48 mL 2    furosemide (LASIX) 40 MG tablet Take 1 tablet (40 mg total) by mouth 2 (two) times daily. 180 tablet 1    hydrocodone-acetaminophen 5-325mg (NORCO) 5-325 mg per tablet Take 1 tablet by mouth every 6 (six) hours as needed for Pain. 12 tablet 0    KLOR-CON M20 20 mEq tablet TAKE ONE TABLET BY MOUTH TWICE DAILY 180 tablet 2    levothyroxine (SYNTHROID) 75 MCG tablet TAKE ONE TABLET BY MOUTH ONCE DAILY BEFORE  BREAKFAST 90 tablet 0    losartan (COZAAR) 100 MG tablet Take 1 tablet (100 mg total) by mouth once daily. 90 tablet 1    metoprolol succinate (TOPROL-XL) 200 MG 24 hr tablet TAKE ONE TABLET BY MOUTH ONCE DAILY 90 tablet 0    needle, disp, 25 gauge (BD REGULAR BEVEL NEEDLES) 25 gauge x 5/8" Ndle Pt to inject  each 2    tramadol (ULTRAM) 50 mg tablet Take 1 tablet (50 mg total) by mouth every 8 (eight) hours as needed for Pain. 90 tablet 0     Current Facility-Administered Medications   Medication Dose Route Frequency Provider Last Rate Last Dose    potassium iodide tablet 130 mg  130 mg Oral On Call Procedure Adryan Hernandez DO, FACP           Review of Systems   Constitutional: Positive for activity change and fatigue. Negative for appetite change, chills, fever and unexpected weight change.   HENT: Negative for dental problem, sinus pressure and sneezing.    Eyes: Negative for visual disturbance.   Respiratory: Negative for cough, choking, chest tightness and shortness of breath.  "   Cardiovascular: Negative for chest pain and leg swelling.   Gastrointestinal: Positive for abdominal distention, abdominal pain, constipation and nausea. Negative for blood in stool and diarrhea.   Genitourinary: Negative for difficulty urinating and dysuria.   Musculoskeletal: Positive for arthralgias. Negative for back pain.   Skin: Negative for rash and wound.   Neurological: Negative for dizziness, light-headedness and headaches.   Hematological: Negative for adenopathy. Does not bruise/bleed easily.   Psychiatric/Behavioral: Negative for sleep disturbance. The patient is not nervous/anxious.        ECOG Performance Status: 2   Objective:      Vitals:   Vitals:    10/02/17 1304   BP: (!) 142/73   BP Location: Right arm   Patient Position: Sitting   BP Method: Medium (Automatic)   Pulse: 90   Temp: 99.2 °F (37.3 °C)   TempSrc: Oral   SpO2: 100%   Weight: 74.8 kg (164 lb 14.5 oz)     BMI: Body mass index is 24.35 kg/m².    Physical Exam   Constitutional: He is oriented to person, place, and time. He appears well-developed and well-nourished.   HENT:   Head: Normocephalic and atraumatic.   Eyes: Pupils are equal, round, and reactive to light. Scleral icterus is present.   Neck: Normal range of motion. Neck supple.   Cardiovascular: Normal rate and regular rhythm.    Pulmonary/Chest: Effort normal and breath sounds normal. No respiratory distress.   Abdominal: Soft. He exhibits distension and mass. There is tenderness.   Musculoskeletal: He exhibits edema. He exhibits no tenderness.   Lymphadenopathy:     He has no cervical adenopathy.   Neurological: He is alert and oriented to person, place, and time. No cranial nerve deficit.   Skin: Skin is warm and dry.   Psychiatric: He has a normal mood and affect. His behavior is normal.       Laboratory Data:  Lab Visit on 10/02/2017   Component Date Value Ref Range Status    WBC 10/02/2017 6.94  3.90 - 12.70 K/uL Final    RBC 10/02/2017 1.96* 4.60 - 6.20 M/uL Final     Hemoglobin 10/02/2017 6.5* 14.0 - 18.0 g/dL Final    Hematocrit 10/02/2017 21.1* 40.0 - 54.0 % Final    MCV 10/02/2017 108* 82 - 98 fL Final    MCH 10/02/2017 33.2* 27.0 - 31.0 pg Final    MCHC 10/02/2017 30.8* 32.0 - 36.0 g/dL Final    RDW 10/02/2017 17.9* 11.5 - 14.5 % Final    Platelets 10/02/2017 78* 150 - 350 K/uL Final    MPV 10/02/2017 12.2  9.2 - 12.9 fL Final    Gran # 10/02/2017 5.0  1.8 - 7.7 K/uL Final    Comment: The ANC is based on a white cell differential from an   automated cell counter. It has not been microscopically   reviewed for the presence of abnormal cells. Clinical   correlation is required.      Sodium 10/02/2017 144  136 - 145 mmol/L Final    Potassium 10/02/2017 4.2  3.5 - 5.1 mmol/L Final    Chloride 10/02/2017 107  95 - 110 mmol/L Final    CO2 10/02/2017 27  23 - 29 mmol/L Final    Glucose 10/02/2017 106  70 - 110 mg/dL Final    BUN, Bld 10/02/2017 28* 8 - 23 mg/dL Final    Creatinine 10/02/2017 1.8* 0.5 - 1.4 mg/dL Final    Calcium 10/02/2017 9.7  8.7 - 10.5 mg/dL Final    Total Protein 10/02/2017 6.7  6.0 - 8.4 g/dL Final    Albumin 10/02/2017 3.1* 3.5 - 5.2 g/dL Final    Total Bilirubin 10/02/2017 3.9* 0.1 - 1.0 mg/dL Final    Comment: For infants and newborns, interpretation of results should be based  on gestational age, weight and in agreement with clinical  observations.  Premature Infant recommended reference ranges:  Up to 24 hours.............<8.0 mg/dL  Up to 48 hours............<12.0 mg/dL  3-5 days..................<15.0 mg/dL  6-29 days.................<15.0 mg/dL      Alkaline Phosphatase 10/02/2017 131  55 - 135 U/L Final    AST 10/02/2017 63* 10 - 40 U/L Final    ALT 10/02/2017 31  10 - 44 U/L Final    Anion Gap 10/02/2017 10  8 - 16 mmol/L Final    eGFR if  10/02/2017 45.0* >60 mL/min/1.73 m^2 Final    eGFR if non African American 10/02/2017 38.9* >60 mL/min/1.73 m^2 Final    Comment: Calculation used to obtain the estimated  glomerular filtration  rate (eGFR) is the CKD-EPI equation. Since race is unknown   in our information system, the eGFR values for   -American and Non--American patients are given   for each creatinine result.     Lab Visit on 09/27/2017   Component Date Value Ref Range Status    BNP 09/27/2017 270* 0 - 99 pg/mL Final    WBC 09/27/2017 5.51  3.90 - 12.70 K/uL Final    RBC 09/27/2017 2.22* 4.60 - 6.20 M/uL Final    Hemoglobin 09/27/2017 7.1* 14.0 - 18.0 g/dL Final    Hematocrit 09/27/2017 23.7* 40.0 - 54.0 % Final    MCV 09/27/2017 107* 82 - 98 fL Final    MCH 09/27/2017 32.0* 27.0 - 31.0 pg Final    MCHC 09/27/2017 30.0* 32.0 - 36.0 g/dL Final    RDW 09/27/2017 16.4* 11.5 - 14.5 % Final    Platelets 09/27/2017 121* 150 - 350 K/uL Final    MPV 09/27/2017 10.7  9.2 - 12.9 fL Final    Gran # 09/27/2017 3.3  1.8 - 7.7 K/uL Final    Lymph # 09/27/2017 1.6  1.0 - 4.8 K/uL Final    Mono # 09/27/2017 0.5  0.3 - 1.0 K/uL Final    Eos # 09/27/2017 0.1  0.0 - 0.5 K/uL Final    Baso # 09/27/2017 0.05  0.00 - 0.20 K/uL Final    Gran% 09/27/2017 60.1  38.0 - 73.0 % Final    Lymph% 09/27/2017 28.3  18.0 - 48.0 % Final    Mono% 09/27/2017 8.2  4.0 - 15.0 % Final    Eosinophil% 09/27/2017 2.5  0.0 - 8.0 % Final    Basophil% 09/27/2017 0.9  0.0 - 1.9 % Final    Platelet Estimate 09/27/2017 Decreased*  Final    Aniso 09/27/2017 Slight   Final    Poik 09/27/2017 Slight   Final    Poly 09/27/2017 Moderate   Final    Hypo 09/27/2017 Occasional   Final    Leverett Cells 09/27/2017 Occasional   Final    Schistocytes 09/27/2017 Present   Final    Large/Giant Platelets 09/27/2017 Present   Final    Differential Method 09/27/2017 Automated   Final    TSH 09/27/2017 2.813  0.400 - 4.000 uIU/mL Final   Office Visit on 09/25/2017   Component Date Value Ref Range Status    POC Residual Urine Volume 09/25/2017 15  0 - 100 mL Final    Color, UA 09/25/2017 YELLOW   Final    Spec Grav UA 09/25/2017 1.005    Final    pH, UA 09/25/2017 6   Final    WBC, UA 09/25/2017 TRACE   Final    Nitrite, UA 09/25/2017 NEG   Final    Protein 09/25/2017 TRACE   Final    Glucose, UA 09/25/2017 NORM   Final    Ketones, UA 09/25/2017 NEG   Final    Urobilinogen, UA 09/25/2017 NORM   Final    Bilirubin 09/25/2017 NEG   Final    Blood, UA 09/25/2017 250   Final    Urine Culture, Routine 09/26/2017 No growth   Final           Imaging:            Assessment:       1. Malignant carcinoid tumor of rectum    2. Secondary neuroendocrine tumor of liver    3. Secondary malignant neoplasm of bone    4. Acute deep vein thrombosis (DVT) of femoral vein of left lower extremity    5. Increased bilirubin level    6. Anemia in neoplastic disease    7. Stage 3 chronic kidney disease    8. Thrombocytopenia           Plan:     Mr. Sandoval had previously declined any further treatment for his metastatic rectal carcinoid tumor and it appears that his disease is progressing as expected.  His bilirubin is increased and he is symptomatic from this.  Ideally, I would like to get an MRI, however, his kidney function has declined.  I will check an ultrasound of the abdomen at this point.  I do not know if this can be reversed.  Additionally, his hemoglobin has dropped which may be causing some of his symptoms.  I have discussed and recommended a transfusion of packed red blood cells.  He is agreeable to proceed and has signed informed consent.  Additionally I would recommend discontinuation of his Lovenox given his bleeding.  I have discussed and recommended an IVC filter placement.  He did have multiple questions related to this and is agreeable to be referred to interventional radiology for further discussion and placement.  His thrombocytopenia is noted and possibly related to medication versus splenic sequestration.  Overall, he has multiple signs of progressive disease which may not reversible at this time.  I will place consultation with  gastroenterology for further evaluation of bleeding and elevated bilirubin.    Adryan Hernandez DO, [facility]  Hematology & Oncology  [address]  [place]  [address]  ph. [phone]; [phone]  fax. [phone]    45 minutes were spent in coordination of patient's care, record review and counseling.  More than 50% of the time was face-to-face.

## 2017-10-02 NOTE — Clinical Note
Refer to GI Refer to interventional radiology for IVC filter Type and screen today for transfusion of packed red blood cells Abdominal ultrasound Follow-up in 4 weeks

## 2017-10-03 ENCOUNTER — INFUSION (OUTPATIENT)
Dept: INFUSION THERAPY | Facility: HOSPITAL | Age: 62
End: 2017-10-03
Attending: INTERNAL MEDICINE
Payer: MEDICARE

## 2017-10-03 VITALS
DIASTOLIC BLOOD PRESSURE: 88 MMHG | RESPIRATION RATE: 18 BRPM | HEART RATE: 87 BPM | SYSTOLIC BLOOD PRESSURE: 188 MMHG | OXYGEN SATURATION: 99 % | TEMPERATURE: 99 F

## 2017-10-03 DIAGNOSIS — D63.0 ANEMIA IN NEOPLASTIC DISEASE: Primary | ICD-10-CM

## 2017-10-03 DIAGNOSIS — D63.0 ANEMIA IN NEOPLASTIC DISEASE: ICD-10-CM

## 2017-10-03 LAB
BLD PROD TYP BPU: NORMAL
BLD PROD TYP BPU: NORMAL
BLOOD UNIT EXPIRATION DATE: NORMAL
BLOOD UNIT EXPIRATION DATE: NORMAL
BLOOD UNIT TYPE CODE: 1700
BLOOD UNIT TYPE CODE: 1700
BLOOD UNIT TYPE: NORMAL
BLOOD UNIT TYPE: NORMAL
CODING SYSTEM: NORMAL
CODING SYSTEM: NORMAL
DISPENSE STATUS: NORMAL
DISPENSE STATUS: NORMAL
TRANS ERYTHROCYTES VOL PATIENT: NORMAL ML
TRANS ERYTHROCYTES VOL PATIENT: NORMAL ML

## 2017-10-03 PROCEDURE — 96375 TX/PRO/DX INJ NEW DRUG ADDON: CPT

## 2017-10-03 PROCEDURE — 96376 TX/PRO/DX INJ SAME DRUG ADON: CPT

## 2017-10-03 PROCEDURE — 36430 TRANSFUSION BLD/BLD COMPNT: CPT

## 2017-10-03 PROCEDURE — 63600175 PHARM REV CODE 636 W HCPCS

## 2017-10-03 PROCEDURE — 96374 THER/PROPH/DIAG INJ IV PUSH: CPT

## 2017-10-03 PROCEDURE — P9021 RED BLOOD CELLS UNIT: HCPCS

## 2017-10-03 PROCEDURE — 25000003 PHARM REV CODE 250

## 2017-10-03 PROCEDURE — 25000003 PHARM REV CODE 250: Performed by: INTERNAL MEDICINE

## 2017-10-03 PROCEDURE — 86920 COMPATIBILITY TEST SPIN: CPT

## 2017-10-03 RX ORDER — HYDROCODONE BITARTRATE AND ACETAMINOPHEN 500; 5 MG/1; MG/1
TABLET ORAL ONCE
Status: DISCONTINUED | OUTPATIENT
Start: 2017-10-03 | End: 2018-09-07 | Stop reason: HOSPADM

## 2017-10-03 RX ORDER — FUROSEMIDE 10 MG/ML
20 INJECTION INTRAMUSCULAR; INTRAVENOUS
Status: DISCONTINUED | OUTPATIENT
Start: 2017-10-03 | End: 2018-09-07 | Stop reason: HOSPADM

## 2017-10-03 RX ORDER — ACETAMINOPHEN 325 MG/1
650 TABLET ORAL
Status: COMPLETED | OUTPATIENT
Start: 2017-10-03 | End: 2017-10-03

## 2017-10-03 RX ORDER — FUROSEMIDE 10 MG/ML
20 INJECTION INTRAMUSCULAR; INTRAVENOUS
Status: DISCONTINUED | OUTPATIENT
Start: 2017-10-03 | End: 2017-10-04 | Stop reason: HOSPADM

## 2017-10-03 RX ORDER — FUROSEMIDE 10 MG/ML
INJECTION INTRAMUSCULAR; INTRAVENOUS
Status: COMPLETED
Start: 2017-10-03 | End: 2017-10-03

## 2017-10-03 RX ORDER — ACETAMINOPHEN 325 MG/1
650 TABLET ORAL
Status: DISCONTINUED | OUTPATIENT
Start: 2017-10-03 | End: 2018-09-07 | Stop reason: HOSPADM

## 2017-10-03 RX ORDER — ACETAMINOPHEN 325 MG/1
TABLET ORAL
Status: COMPLETED
Start: 2017-10-03 | End: 2017-10-03

## 2017-10-03 RX ORDER — FUROSEMIDE 10 MG/ML
20 INJECTION INTRAMUSCULAR; INTRAVENOUS
Status: COMPLETED | OUTPATIENT
Start: 2017-10-03 | End: 2017-10-03

## 2017-10-03 RX ORDER — DIPHENHYDRAMINE HCL 25 MG
25 CAPSULE ORAL
Status: COMPLETED | OUTPATIENT
Start: 2017-10-03 | End: 2017-10-03

## 2017-10-03 RX ORDER — HYDROCODONE BITARTRATE AND ACETAMINOPHEN 500; 5 MG/1; MG/1
TABLET ORAL ONCE
Status: COMPLETED | OUTPATIENT
Start: 2017-10-03 | End: 2017-10-03

## 2017-10-03 RX ORDER — DIPHENHYDRAMINE HCL 25 MG
25 CAPSULE ORAL
Status: DISCONTINUED | OUTPATIENT
Start: 2017-10-03 | End: 2018-09-07 | Stop reason: HOSPADM

## 2017-10-03 RX ADMIN — FUROSEMIDE 20 MG: 10 INJECTION INTRAMUSCULAR; INTRAVENOUS at 03:10

## 2017-10-03 RX ADMIN — SODIUM CHLORIDE: 0.9 INJECTION, SOLUTION INTRAVENOUS at 09:10

## 2017-10-03 RX ADMIN — ACETAMINOPHEN 650 MG: 325 TABLET ORAL at 09:10

## 2017-10-03 RX ADMIN — FUROSEMIDE 20 MG: 10 INJECTION, SOLUTION INTRAMUSCULAR; INTRAVENOUS at 03:10

## 2017-10-03 RX ADMIN — FUROSEMIDE 20 MG: 10 INJECTION INTRAMUSCULAR; INTRAVENOUS at 12:10

## 2017-10-03 RX ADMIN — DIPHENHYDRAMINE HYDROCHLORIDE 25 MG: 25 CAPSULE ORAL at 10:10

## 2017-10-03 RX ADMIN — FUROSEMIDE 20 MG: 10 INJECTION, SOLUTION INTRAMUSCULAR; INTRAVENOUS at 12:10

## 2017-10-03 NOTE — NURSING
1400- called Dr Hernandez office @ [phone], Spoke with this Nurse Barbi to communicate to Dr Hernandez that patient's BP has elevated to 175/85 1 hour into 2 unit of transfusion. She communicated the information to him with instructions for me to continue with infusion and to  recheck BP in 30 minutes and and call with results.    1430- Rechecked Bp as instructed, /82, Hr 79 patient asymptomatic denying CP, SOB, palpatations or anything associated with HTN O2 Sats 99 Room air , sitting up in chair with NADN placed call to Dr Hernandez office and notified Barbi  she states Dr Hernandez was currently in the room with another patient and that she will call me when she speaks with him.       150- received return call from Barbi she was made aware that patient has completed unit 2 of blood., she states that Dr Hernandez wants to instruct patient to take BP medication when he gets home. Patient advised to take bp medication when he gets home per Dr Hernandez,  patient and wife verbalizes understanded.

## 2017-10-03 NOTE — PLAN OF CARE
Problem: Patient Care Overview  Goal: Plan of Care Review  Patient tolerated 2 units of blood well, with elevated Bp noted during 2nd unit, Dr Hernandez aware, patient stable, denies complaints, ambulated from area without distress , accompanied by wife re instructed patient to take bp medication when he returns home.

## 2017-10-04 ENCOUNTER — HOSPITAL ENCOUNTER (OUTPATIENT)
Dept: RADIOLOGY | Facility: OTHER | Age: 62
Discharge: HOME | End: 2017-10-04
Attending: INTERNAL MEDICINE
Payer: MEDICARE

## 2017-10-04 DIAGNOSIS — C7B.8 SECONDARY NEUROENDOCRINE TUMOR OF LIVER: ICD-10-CM

## 2017-10-04 DIAGNOSIS — C7A.026 MALIGNANT CARCINOID TUMOR OF RECTUM: ICD-10-CM

## 2017-10-04 DIAGNOSIS — I82.412 ACUTE DEEP VEIN THROMBOSIS (DVT) OF FEMORAL VEIN OF LEFT LOWER EXTREMITY: ICD-10-CM

## 2017-10-04 DIAGNOSIS — R17 INCREASED BILIRUBIN LEVEL: ICD-10-CM

## 2017-10-04 DIAGNOSIS — D69.6 THROMBOCYTOPENIA: ICD-10-CM

## 2017-10-04 DIAGNOSIS — D63.0 ANEMIA IN NEOPLASTIC DISEASE: ICD-10-CM

## 2017-10-04 DIAGNOSIS — N18.30 STAGE 3 CHRONIC KIDNEY DISEASE: ICD-10-CM

## 2017-10-04 DIAGNOSIS — C79.51 SECONDARY MALIGNANT NEOPLASM OF BONE: ICD-10-CM

## 2017-10-04 PROCEDURE — 76700 US EXAM ABDOM COMPLETE: CPT | Mod: TC

## 2017-10-04 PROCEDURE — 76700 US EXAM ABDOM COMPLETE: CPT | Mod: 26,,, | Performed by: RADIOLOGY

## 2017-10-12 ENCOUNTER — TELEPHONE (OUTPATIENT)
Dept: HEMATOLOGY/ONCOLOGY | Facility: CLINIC | Age: 62
End: 2017-10-12

## 2017-10-13 DIAGNOSIS — C7B.8 SECONDARY NEUROENDOCRINE TUMOR OF LIVER: ICD-10-CM

## 2017-10-13 DIAGNOSIS — C7A.026 MALIGNANT CARCINOID TUMOR OF RECTUM: Primary | ICD-10-CM

## 2017-10-19 DIAGNOSIS — C7A.026 MALIGNANT CARCINOID TUMOR OF RECTUM: Primary | ICD-10-CM

## 2017-10-19 DIAGNOSIS — I82.412 ACUTE DEEP VEIN THROMBOSIS (DVT) OF FEMORAL VEIN OF LEFT LOWER EXTREMITY: ICD-10-CM

## 2017-10-23 ENCOUNTER — APPOINTMENT (OUTPATIENT)
Dept: INTERVENTIONAL RADIOLOGY/VASCULAR | Facility: HOSPITAL | Age: 62
End: 2017-10-23
Attending: INTERNAL MEDICINE
Payer: MEDICARE

## 2017-10-23 ENCOUNTER — SURGERY (OUTPATIENT)
Age: 62
End: 2017-10-23

## 2017-10-23 PROCEDURE — C1880 VENA CAVA FILTER: HCPCS

## 2017-10-23 PROCEDURE — 99152 MOD SED SAME PHYS/QHP 5/>YRS: CPT | Mod: ,,, | Performed by: RADIOLOGY

## 2017-10-23 PROCEDURE — 99153 MOD SED SAME PHYS/QHP EA: CPT

## 2017-10-23 PROCEDURE — 99152 MOD SED SAME PHYS/QHP 5/>YRS: CPT

## 2017-10-23 PROCEDURE — 37191 INS ENDOVAS VENA CAVA FILTR: CPT | Mod: 52,,, | Performed by: RADIOLOGY

## 2017-10-23 PROCEDURE — C1751 CATH, INF, PER/CENT/MIDLINE: HCPCS

## 2017-10-26 ENCOUNTER — OFFICE VISIT (OUTPATIENT)
Dept: GASTROENTEROLOGY | Facility: CLINIC | Age: 62
End: 2017-10-26
Payer: MEDICARE

## 2017-10-26 VITALS
WEIGHT: 179.44 LBS | HEIGHT: 69 IN | BODY MASS INDEX: 26.58 KG/M2 | DIASTOLIC BLOOD PRESSURE: 86 MMHG | HEART RATE: 73 BPM | SYSTOLIC BLOOD PRESSURE: 165 MMHG

## 2017-10-26 DIAGNOSIS — D63.8 ANEMIA IN OTHER CHRONIC DISEASES CLASSIFIED ELSEWHERE: Primary | ICD-10-CM

## 2017-10-26 PROCEDURE — 99499 UNLISTED E&M SERVICE: CPT | Mod: S$PBB,,, | Performed by: INTERNAL MEDICINE

## 2017-10-26 PROCEDURE — 99999 PR PBB SHADOW E&M-EST. PATIENT-LVL III: CPT | Mod: PBBFAC,,, | Performed by: INTERNAL MEDICINE

## 2017-10-26 PROCEDURE — 99203 OFFICE O/P NEW LOW 30 MIN: CPT | Mod: S$GLB,,, | Performed by: INTERNAL MEDICINE

## 2017-10-26 RX ORDER — ENOXAPARIN SODIUM 100 MG/ML
INJECTION SUBCUTANEOUS
COMMUNITY
Start: 2017-09-27 | End: 2018-03-27

## 2017-10-26 NOTE — PROGRESS NOTES
REASON FOR VISIT:  Abdominal distention and anemia.    HISTORY OF PRESENT ILLNESS:  Mr. Sandoval is a 62-year-old gentleman with known   metastatic neuroendocrine tumor with primary in the rectum.  He has previously   declined further therapy for his neuroendocrine tumor because of poor tolerance   to prior therapy.  Currently, he comes here because of progressive anemia   without any overt gastrointestinal bleeding.  Mr. Sandoval has been noticing   progressive distention of his abdomen and he was uncertain what was causing that   and also Dr. Hernandez had a question about anemia or whether any GI workup   needed to be done.  Upon further evaluation, it appears that Mr. Sandoval has   metastatic disease with progression and is not having any overt active GI   bleeding.  Therefore, I have suggested to Mr. Sandoval that proceeding with an   endoscopy or colonoscopy would not be beneficial to him at this time.  Also, his   abdominal distention on review seems to be increasing tumor burden in his liver   and in his upper abdomen.    PAST MEDICAL, SURGICAL, SOCIAL AND FAMILY HISTORY:  Reviewed.    MEDICATIONS AND ALLERGIES:  Reviewed.    REVIEW OF SYSTEMS:  CONSTITUTIONAL:  No fevers or chills.  Malaise, fatigue, decreased appetite.  EYES:  No visual changes.  ENT:  No odynophagia or hoarseness of voice.  CARDIOVASCULAR:  No angina or palpitations.  RESPIRATORY:  No shortness of breath or wheezing.  GASTROINTESTINAL:  Progressive distention of his abdomen.    PHYSICAL EXAMINATION:  VITAL SIGNS:  See EPIC.  GENERAL:  Awake, alert and oriented x3, in no acute distress.  Appears very   frail.  ABDOMEN:  Protuberant, soft, nontender.  Large irregular mass could be palpable   in the upper abdomen extending from the left upper quadrant to the right lower   quadrant consistent with enlarged liver with irregular contour and nontender,   hard on exam consistent with prior diagnosis of neuroendocrine tumor of the   liver.  No ascites  clinically detectable.  Bowel sounds are normal.  LOWER EXTREMITIES:  Bilateral pitting severe pedal edema.  CARDIOVASCULAR:  S1, S2 normal.  RESPIRATORY:  Bilateral air entry equal.  SKIN:  No palmar erythema, dry.  NEUROLOGIC:  No asterixis.  PSYCHIATRIC:  Affect flat.  NECK:  Supple with prominent carotid pulsation on the right side with no bruits.    No cervical adenopathy.    IMPRESSION:  1.  Progressive metastatic neuroendocrine tumor with increasing bilirubin level   consistent with cholestasis and tumor burden.  2.  Anemia, multifactorial with no overt active GI bleed.    RECOMMENDATION:  1.  No further GI evaluation or intervention needed at this time.  2.  Refer back to Dr. Hernandez for followup.      ACT/HN  dd: 10/26/2017 16:18:46 (CDT)  td: 10/27/2017 11:46:53 (CDT)  Doc ID   #[id number]  Job ID #[id number]    CC:

## 2017-10-26 NOTE — LETTER
October 26, 2017      Adryan Hernandez DO, FACP  [address] [facility] [address]           [facility]  [address]  [facility] [address]  Phone: [phone]  Fax: [phone]          Patient: Ronnie Sandoval   MR Number: [id number]   YOB: 1955   Date of Visit: 10/26/2017       Dear Dr. Adryan Hernandez:    Thank you for referring Ronnie Sandoval to me for evaluation. Attached you will find relevant portions of my assessment and plan of care.    If you have questions, please do not hesitate to call me. I look forward to following Ronnie Sandoval along with you.    Sincerely,    Dimas Lucas MD    Enclosure  CC:  No Recipients    If you would like to receive this communication electronically, please contact [email or web address] or [phone] to request more information on [employer] access.    For providers and/or their staff who would like to refer a patient to [facility], please contact us through our one-stop-shop provider referral line, [facility], at [phone].    If you feel you have received this communication in error or would no longer like to receive these types of communications, please e-mail [email or web address]

## 2017-11-13 RX ORDER — METOPROLOL SUCCINATE 200 MG/1
200 TABLET, EXTENDED RELEASE ORAL DAILY
Qty: 90 TABLET | Refills: 2 | Status: SHIPPED | OUTPATIENT
Start: 2017-11-13 | End: 2018-09-24 | Stop reason: SDUPTHER

## 2017-11-13 NOTE — TELEPHONE ENCOUNTER
----- Message from Lilia Wallace sent at 11/13/2017 12:56 PM CST -----  Contact: Pt  X  1st Request  _  2nd Request  _  3rd Request    Please refill the medication(s) listed below. Please call the patient when the prescription(s) is ready for  at this phone number [phone].            Medication #1 metoprolol succinate (TOPROL-XL) 200 MG 24 hr tablet    Preferred Pharmacy:  [place] [facility] [address]

## 2017-11-21 ENCOUNTER — PATIENT MESSAGE (OUTPATIENT)
Dept: INTERNAL MEDICINE | Facility: CLINIC | Age: 62
End: 2017-11-21

## 2017-11-22 RX ORDER — LOSARTAN POTASSIUM 100 MG/1
100 TABLET ORAL DAILY
Qty: 90 TABLET | Refills: 2 | Status: SHIPPED | OUTPATIENT
Start: 2017-11-22 | End: 2018-10-09 | Stop reason: SDUPTHER

## 2017-11-22 RX ORDER — LEVOTHYROXINE SODIUM 75 UG/1
75 TABLET ORAL
Qty: 90 TABLET | Refills: 3 | Status: SHIPPED | OUTPATIENT
Start: 2017-11-22 | End: 2019-01-05 | Stop reason: SDUPTHER

## 2018-01-22 ENCOUNTER — OFFICE VISIT (OUTPATIENT)
Dept: INTERNAL MEDICINE | Facility: CLINIC | Age: 63
End: 2018-01-22
Attending: INTERNAL MEDICINE
Payer: MEDICARE

## 2018-01-22 VITALS
DIASTOLIC BLOOD PRESSURE: 78 MMHG | HEIGHT: 69 IN | SYSTOLIC BLOOD PRESSURE: 134 MMHG | HEART RATE: 94 BPM | BODY MASS INDEX: 27.85 KG/M2 | WEIGHT: 188.06 LBS | OXYGEN SATURATION: 98 %

## 2018-01-22 DIAGNOSIS — R60.0 BILATERAL LEG EDEMA: Primary | ICD-10-CM

## 2018-01-22 DIAGNOSIS — I82.412 ACUTE DEEP VEIN THROMBOSIS (DVT) OF FEMORAL VEIN OF LEFT LOWER EXTREMITY: ICD-10-CM

## 2018-01-22 PROCEDURE — 99999 PR PBB SHADOW E&M-EST. PATIENT-LVL III: CPT | Mod: PBBFAC,,, | Performed by: INTERNAL MEDICINE

## 2018-01-22 PROCEDURE — 99499 UNLISTED E&M SERVICE: CPT | Mod: S$GLB,,, | Performed by: INTERNAL MEDICINE

## 2018-01-22 PROCEDURE — 99214 OFFICE O/P EST MOD 30 MIN: CPT | Mod: S$GLB,,, | Performed by: INTERNAL MEDICINE

## 2018-01-22 PROCEDURE — 3008F BODY MASS INDEX DOCD: CPT | Mod: S$GLB,,, | Performed by: INTERNAL MEDICINE

## 2018-01-22 RX ORDER — FUROSEMIDE 80 MG/1
80 TABLET ORAL 2 TIMES DAILY
Qty: 180 TABLET | Refills: 1 | Status: SHIPPED | OUTPATIENT
Start: 2018-01-22 | End: 2018-10-15

## 2018-01-22 NOTE — PATIENT INSTRUCTIONS

## 2018-03-05 ENCOUNTER — TELEPHONE (OUTPATIENT)
Dept: INTERNAL MEDICINE | Facility: CLINIC | Age: 63
End: 2018-03-05

## 2018-03-05 DIAGNOSIS — N18.30 CKD (CHRONIC KIDNEY DISEASE) STAGE 3, GFR 30-59 ML/MIN: Primary | ICD-10-CM

## 2018-03-05 NOTE — TELEPHONE ENCOUNTER
----- Message from Samuel Raymond sent at 3/2/2018  2:26 PM CST -----  Contact: patient  x_ 1st Request   _ 2nd Request   _ 3rd Request     Who: RASHAD ROBERT [[id number]]    Why: patient called stated he is suppose to get more labs.  No orders in the system,  please call the patient.    What Number to Call Back: [phone]    When to Expect a call back: (Before the end of the day)   -- if call after 3:00 call back will be tomorrow.

## 2018-03-07 ENCOUNTER — LAB VISIT (OUTPATIENT)
Dept: LAB | Facility: OTHER | Age: 63
End: 2018-03-07
Payer: MEDICARE

## 2018-03-07 DIAGNOSIS — N18.30 CKD (CHRONIC KIDNEY DISEASE) STAGE 3, GFR 30-59 ML/MIN: ICD-10-CM

## 2018-03-07 LAB
ALBUMIN SERPL BCP-MCNC: 2.9 G/DL
ALP SERPL-CCNC: 98 U/L
ALT SERPL W/O P-5'-P-CCNC: 13 U/L
ANION GAP SERPL CALC-SCNC: 12 MMOL/L
AST SERPL-CCNC: 29 U/L
BILIRUB SERPL-MCNC: 2.2 MG/DL
BUN SERPL-MCNC: 34 MG/DL
CALCIUM SERPL-MCNC: 9 MG/DL
CHLORIDE SERPL-SCNC: 107 MMOL/L
CO2 SERPL-SCNC: 25 MMOL/L
CREAT SERPL-MCNC: 1.7 MG/DL
EST. GFR  (AFRICAN AMERICAN): 48 ML/MIN/1.73 M^2
EST. GFR  (NON AFRICAN AMERICAN): 42 ML/MIN/1.73 M^2
GLUCOSE SERPL-MCNC: 99 MG/DL
POTASSIUM SERPL-SCNC: 4.1 MMOL/L
PROT SERPL-MCNC: 6.2 G/DL
SODIUM SERPL-SCNC: 144 MMOL/L

## 2018-03-07 PROCEDURE — 36415 COLL VENOUS BLD VENIPUNCTURE: CPT

## 2018-03-07 PROCEDURE — 80053 COMPREHEN METABOLIC PANEL: CPT

## 2018-03-26 ENCOUNTER — PATIENT OUTREACH (OUTPATIENT)
Dept: ADMINISTRATIVE | Facility: HOSPITAL | Age: 63
End: 2018-03-26

## 2018-03-26 NOTE — PROGRESS NOTES
[facility] is committed to your overall health.  To help you get the most out of each of your visits, we will review your information to make sure you are up to date on all of your recommended tests and/or procedures.       Your PCP  Daniel Hoover MD   found that you may be due for:       Health Maintenance Due   Topic Date Due    Lipid Panel  03/11/2016    Pneumococcal PPSV23 (High Risk) (1) 07/04/2017             If you have had any of the above done at another facility, please bring the records or information with you so that your record at [facility] will be complete.  If you would like to schedule any of these, please contact me.     If you are currently taking medication, please bring it with you to your appointment for review.     Also, if you have any type of Advanced Directives, please bring them with you to your office visit so we may scan them into your chart.       Thank you for Choosing [facility] for your healthcare needs.        Additional Information  If you have questions, you can email [email or web address] or call [phone]  to talk to our [facility] staff. Remember, [facility] is NOT to be used for urgent needs. For medical emergencies, dial 911.

## 2018-03-27 ENCOUNTER — LAB VISIT (OUTPATIENT)
Dept: LAB | Facility: OTHER | Age: 63
End: 2018-03-27
Attending: FAMILY MEDICINE
Payer: MEDICARE

## 2018-03-27 ENCOUNTER — OFFICE VISIT (OUTPATIENT)
Dept: INTERNAL MEDICINE | Facility: CLINIC | Age: 63
End: 2018-03-27
Attending: FAMILY MEDICINE
Payer: MEDICARE

## 2018-03-27 VITALS
SYSTOLIC BLOOD PRESSURE: 116 MMHG | BODY MASS INDEX: 27.39 KG/M2 | DIASTOLIC BLOOD PRESSURE: 70 MMHG | HEIGHT: 69 IN | OXYGEN SATURATION: 97 % | HEART RATE: 92 BPM | WEIGHT: 184.94 LBS

## 2018-03-27 DIAGNOSIS — N18.30 CKD (CHRONIC KIDNEY DISEASE) STAGE 3, GFR 30-59 ML/MIN: ICD-10-CM

## 2018-03-27 DIAGNOSIS — J06.9 VIRAL UPPER RESPIRATORY TRACT INFECTION: Primary | ICD-10-CM

## 2018-03-27 DIAGNOSIS — R60.0 BILATERAL LEG EDEMA: ICD-10-CM

## 2018-03-27 DIAGNOSIS — R06.01: ICD-10-CM

## 2018-03-27 DIAGNOSIS — E78.5 HYPERLIPIDEMIA, UNSPECIFIED HYPERLIPIDEMIA TYPE: ICD-10-CM

## 2018-03-27 DIAGNOSIS — R06.00 PND (PAROXYSMAL NOCTURNAL DYSPNEA): ICD-10-CM

## 2018-03-27 DIAGNOSIS — C7A.026 MALIGNANT CARCINOID TUMOR OF RECTUM: ICD-10-CM

## 2018-03-27 DIAGNOSIS — D64.9 ANEMIA, UNSPECIFIED TYPE: ICD-10-CM

## 2018-03-27 LAB
ALBUMIN SERPL BCP-MCNC: 2.9 G/DL
ALP SERPL-CCNC: 102 U/L
ALT SERPL W/O P-5'-P-CCNC: 12 U/L
ANION GAP SERPL CALC-SCNC: 11 MMOL/L
AST SERPL-CCNC: 21 U/L
BASOPHILS # BLD AUTO: 0.08 K/UL
BASOPHILS NFR BLD: 1.1 %
BILIRUB SERPL-MCNC: 2.6 MG/DL
BNP SERPL-MCNC: 3885 PG/ML
BUN SERPL-MCNC: 38 MG/DL
CALCIUM SERPL-MCNC: 8.8 MG/DL
CHLORIDE SERPL-SCNC: 106 MMOL/L
CO2 SERPL-SCNC: 28 MMOL/L
CREAT SERPL-MCNC: 1.7 MG/DL
DIFFERENTIAL METHOD: ABNORMAL
EOSINOPHIL # BLD AUTO: 0.2 K/UL
EOSINOPHIL NFR BLD: 2.4 %
ERYTHROCYTE [DISTWIDTH] IN BLOOD BY AUTOMATED COUNT: 16.8 %
EST. GFR  (AFRICAN AMERICAN): 48 ML/MIN/1.73 M^2
EST. GFR  (NON AFRICAN AMERICAN): 42 ML/MIN/1.73 M^2
GLUCOSE SERPL-MCNC: 89 MG/DL
HCT VFR BLD AUTO: 21.7 %
HGB BLD-MCNC: 6.3 G/DL
LYMPHOCYTES # BLD AUTO: 1.2 K/UL
LYMPHOCYTES NFR BLD: 16.2 %
MCH RBC QN AUTO: 32.6 PG
MCHC RBC AUTO-ENTMCNC: 29 G/DL
MCV RBC AUTO: 112 FL
MONOCYTES # BLD AUTO: 0.4 K/UL
MONOCYTES NFR BLD: 5.2 %
NEUTROPHILS # BLD AUTO: 5.3 K/UL
NEUTROPHILS NFR BLD: 75 %
PLATELET # BLD AUTO: 155 K/UL
PMV BLD AUTO: 11.8 FL
POTASSIUM SERPL-SCNC: 4 MMOL/L
PROT SERPL-MCNC: 6.3 G/DL
RBC # BLD AUTO: 1.93 M/UL
SODIUM SERPL-SCNC: 145 MMOL/L
WBC # BLD AUTO: 7.09 K/UL

## 2018-03-27 PROCEDURE — 3074F SYST BP LT 130 MM HG: CPT | Mod: CPTII,S$GLB,, | Performed by: FAMILY MEDICINE

## 2018-03-27 PROCEDURE — 85025 COMPLETE CBC W/AUTO DIFF WBC: CPT

## 2018-03-27 PROCEDURE — 36415 COLL VENOUS BLD VENIPUNCTURE: CPT

## 2018-03-27 PROCEDURE — 99213 OFFICE O/P EST LOW 20 MIN: CPT | Mod: S$GLB,,, | Performed by: FAMILY MEDICINE

## 2018-03-27 PROCEDURE — 3078F DIAST BP <80 MM HG: CPT | Mod: CPTII,S$GLB,, | Performed by: FAMILY MEDICINE

## 2018-03-27 PROCEDURE — 83880 ASSAY OF NATRIURETIC PEPTIDE: CPT

## 2018-03-27 PROCEDURE — 99999 PR PBB SHADOW E&M-EST. PATIENT-LVL III: CPT | Mod: PBBFAC,,, | Performed by: FAMILY MEDICINE

## 2018-03-27 PROCEDURE — 99499 UNLISTED E&M SERVICE: CPT | Mod: S$GLB,,, | Performed by: FAMILY MEDICINE

## 2018-03-27 PROCEDURE — 80053 COMPREHEN METABOLIC PANEL: CPT

## 2018-03-27 RX ORDER — CODEINE PHOSPHATE AND GUAIFENESIN 10; 100 MG/5ML; MG/5ML
5 SOLUTION ORAL 3 TIMES DAILY PRN
Qty: 180 ML | Refills: 0 | Status: SHIPPED | OUTPATIENT
Start: 2018-03-27 | End: 2018-04-06

## 2018-03-27 NOTE — PROGRESS NOTES
CHIEF COMPLAINT: Continued lower extremity edema and URI symptoms    HISTORY OF PRESENT ILLNESS: The patient is a chronically ill 62-year-old black male.  He has a metastatic neuroendocrine tumor.  For several months he has had bilateral lower extremity edema which is not improved on 80 of Lasix twice a day.  For the past week or so he's had what he describes as cold symptoms although it sounds more like paroxysmal nocturnal dyspnea.  When he lays flat he gets short of breath and starts having a congested feeling in his chest with cough.  Upon sitting up symptoms resolve with time.  He does not have a history of congestive heart failure.  He has not seen a cardiologist previously.      He has not seen his neuro endocrine oncologist in a while.  He has been seen by his PCP in the past month or 2 and had his Lasix adjusted.  Unfortunately not much help with this.    He has stage III chronic kidney disease.    He developed an acute DVT last year but is currently not anticoagulated.      He has chronic anemia which is believed to be due to his multiple medical illnesses.    REVIEW OF SYSTEMS:  GENERAL: No fever, chills, fatigability or weight loss.  SKIN: No rashes, itching or changes in color or texture of skin.  HEAD: No headaches or recent head trauma.  EYES: Visual acuity fine. No photophobia, ocular pain or diplopia.  EARS: Denies ear pain, discharge or vertigo.  NOSE: No loss of smell, no epistaxis or postnasal drip.  MOUTH & THROAT: No hoarseness or change in voice. No excessive gum bleeding.  NODES: Denies swollen glands.  CHEST: Denies sputum production.  CARDIOVASCULAR: Denies chest pain or reduced exercise tolerance.  ABDOMEN: Appetite fine. No weight loss. Denies diarrhea, abdominal pain, hematemesis or blood in stool.  URINARY: No flank pain, dysuria or hematuria.  PERIPHERAL VASCULAR: No claudication or cyanosis.  MUSCULOSKELETAL: No joint stiffness or swelling. Denies back pain.  NEUROLOGIC: No history of  "seizures, paralysis, alteration of gait or coordination.    SOCIAL HISTORY: The patient does not smoke.  The patient consumes alcohol socially.  The patient is single.    PHYSICAL EXAMINATION:   Blood pressure 116/70, pulse 92, height 5' 9" (1.753 m), weight 83.9 kg (184 lb 15.5 oz), SpO2 97 %.    APPEARANCE: Well nourished, well developed, in no acute distress.    HEAD: Normocephalic, atraumatic.  EYES: PERRL. EOMI.  Conjunctivae without injection and  anicteric  EARS: TM's intact. Light reflex normal. No retraction or perforation.    NOSE: Mucosa pink. Airway clear.  MOUTH & THROAT: No tonsillar enlargement. No pharyngeal erythema or exudate. No stridor.  NECK: Supple.   NODES: No cervical, axillary or inguinal lymph node enlargement.  CHEST: Lungs clear to auscultation.  No retractions are noted.  No rales or rhonchi are present.  CARDIOVASCULAR: Normal S1, S2. No rubs, murmurs or gallops.  ABDOMEN: Bowel sounds normal. Not distended. Soft. No tenderness or masses.  No ascites is noted.  MUSCULOSKELETAL:  There is no clubbing, cyanosis, or edema of the extremities x4.  There is full range of motion of the lumbar spine.  There is full range of motion of the extremities x4.  There is no deformity noted.    NEUROLOGIC:       Normal speech development.      Hearing normal.      Normal gait.      Motor and sensory exams grossly normal.      DTR's normal.  PSYCHIATRIC: Patient is alert and oriented x3.  Thought processes are all normal.  There is no homicidality.  There is no suicidality.  There is no evidence of psychosis.    LABORATORY/RADIOLOGY:   Chart reviewed.  We will update blood work today.    ASSESSMENT:   New onset orthopnea    PND  4+ bilateral lower extremity edema  Hypertension  Hyperlipidemia  Hypothyroidism  Cough    PLAN:  Follow up blood work  He will see cardiology in the morning.  Juan  Follow-up with PCP and neuroendocrine oncology.    "

## 2018-03-27 NOTE — PROGRESS NOTES
Patient, Ronnie Sandoval (MRN #[id number]), presented with a recent Platelet count less than 150 K/uL consistent with the definition of thrombocytopenia (ICD10 - D69.6).    Platelets   Date Value Ref Range Status   10/02/2017 78 (L) 150 - 350 K/uL Final     The patient's thrombocytopenia was monitored, evaluated, addressed and/or treated. This addendum to the medical record is made on 03/27/2018.

## 2018-03-28 ENCOUNTER — OFFICE VISIT (OUTPATIENT)
Dept: CARDIOLOGY | Facility: CLINIC | Age: 63
End: 2018-03-28
Payer: MEDICARE

## 2018-03-28 ENCOUNTER — HOSPITAL ENCOUNTER (OUTPATIENT)
Dept: CARDIOLOGY | Facility: CLINIC | Age: 63
Discharge: HOME | End: 2018-03-28
Attending: INTERNAL MEDICINE
Payer: MEDICARE

## 2018-03-28 VITALS
DIASTOLIC BLOOD PRESSURE: 77 MMHG | WEIGHT: 184.75 LBS | HEIGHT: 69 IN | HEART RATE: 89 BPM | BODY MASS INDEX: 27.36 KG/M2 | SYSTOLIC BLOOD PRESSURE: 129 MMHG

## 2018-03-28 DIAGNOSIS — I42.0 DILATED CARDIOMYOPATHY: ICD-10-CM

## 2018-03-28 DIAGNOSIS — E78.5 HYPERLIPIDEMIA, UNSPECIFIED HYPERLIPIDEMIA TYPE: ICD-10-CM

## 2018-03-28 DIAGNOSIS — I10 ESSENTIAL HYPERTENSION: ICD-10-CM

## 2018-03-28 DIAGNOSIS — R60.0 LOWER EXTREMITY EDEMA: Primary | ICD-10-CM

## 2018-03-28 DIAGNOSIS — I42.9 CARDIOMYOPATHY: ICD-10-CM

## 2018-03-28 DIAGNOSIS — R60.0 LOWER EXTREMITY EDEMA: ICD-10-CM

## 2018-03-28 DIAGNOSIS — I82.412 ACUTE DEEP VEIN THROMBOSIS (DVT) OF FEMORAL VEIN OF LEFT LOWER EXTREMITY: ICD-10-CM

## 2018-03-28 LAB
AORTIC VALVE REGURGITATION: ABNORMAL
DIASTOLIC DYSFUNCTION: YES
ESTIMATED PA SYSTOLIC PRESSURE: 24.81
MITRAL VALVE MOBILITY: NORMAL
RETIRED EF AND QEF - SEE NOTES: 40 (ref 55–65)

## 2018-03-28 PROCEDURE — 93306 TTE W/DOPPLER COMPLETE: CPT | Mod: S$GLB,,, | Performed by: INTERNAL MEDICINE

## 2018-03-28 PROCEDURE — 99999 PR PBB SHADOW E&M-EST. PATIENT-LVL IV: CPT | Mod: PBBFAC,GC,, | Performed by: INTERNAL MEDICINE

## 2018-03-28 PROCEDURE — 99203 OFFICE O/P NEW LOW 30 MIN: CPT | Mod: GC,S$GLB,, | Performed by: INTERNAL MEDICINE

## 2018-03-28 PROCEDURE — 3074F SYST BP LT 130 MM HG: CPT | Mod: CPTII,GC,S$GLB, | Performed by: INTERNAL MEDICINE

## 2018-03-28 PROCEDURE — 99499 UNLISTED E&M SERVICE: CPT | Mod: S$GLB,,, | Performed by: INTERNAL MEDICINE

## 2018-03-28 PROCEDURE — 3078F DIAST BP <80 MM HG: CPT | Mod: CPTII,GC,S$GLB, | Performed by: INTERNAL MEDICINE

## 2018-03-28 RX ORDER — METOLAZONE 2.5 MG/1
2.5 TABLET ORAL DAILY PRN
Qty: 30 TABLET | Refills: 11 | Status: ON HOLD | OUTPATIENT
Start: 2018-03-28 | End: 2018-09-06

## 2018-03-28 NOTE — PROGRESS NOTES
Subjective:    Patient ID:  Ronnie Sandoval is a 62 y.o. male who presents for evaluation of Bilateral leg edema; Malignant carcinoid tumor of rectum; and CKD (chronic kidney disease) stage 3, GFR 30-59 ml/min    HPI Mr. Sandoval is a 62 y.o. Man with chronic kidney disease, DVT lower extremity not on anti-coagulation, HTN, HLD, metastatic neuroendocrine tumor, chronic anemia - who presents for clinic for evaluation of bilateral lower extremity edema recently diagnosed by primary care physician.  He reports lower extremity chronically over the past year, however worsened over the past 4-5 months.  No orthopnea, no chest pain, no palpitations, no syncope.  No PND.  He also has abdominal swelling which has worsened over the same time frame.  No history of heart failure.  He is unsure of the type of chemotherapy he received at [facility] - is on metoprolol and losartan for hypertension. No history of coronary artery disease, and most recent echo here in 2015 with normal LVEF without significant valvular disease.  He has been on lasix prescribed by PCP - currently up to 80 BID with mild resolution of symptoms but no real improvement.      Neuroendocrine tumor first diagnosed several years ago, later confirmed to have primary in rectum but diagnosed with inguinal lymphadenopathy.  Was on cisplatin and etoposide here and something prior to this at [facility]  Last saw heme-onc this past fall and did not want any further chemotherapy at that time, has not seen since.  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 Taken off of lovenox by hemo-onc with dark stools and significant anemia - likely related to malignancy.      Review of Systems   Constitution: Positive for weakness and malaise/fatigue. Negative for diaphoresis, fever and night sweats.   HENT: Negative.    Eyes: Negative.    Cardiovascular: Positive for dyspnea on exertion and leg swelling. Negative for chest  "pain, claudication, cyanosis, irregular heartbeat, near-syncope, orthopnea, palpitations, paroxysmal nocturnal dyspnea and syncope.   Respiratory: Positive for cough and shortness of breath. Negative for sleep disturbances due to breathing, sputum production and wheezing.    Endocrine: Negative.    Hematologic/Lymphatic: Negative.    Musculoskeletal: Negative.    Gastrointestinal: Positive for bloating.   Genitourinary: Negative.    Psychiatric/Behavioral: Negative.      Current Outpatient Prescriptions on File Prior to Visit   Medication Sig Dispense Refill    furosemide (LASIX) 80 MG tablet Take 1 tablet (80 mg total) by mouth 2 (two) times daily. 180 tablet 1    guaifenesin-codeine 100-10 mg/5 ml (TUSSI-ORGANIDIN NR)  mg/5 mL syrup Take 5 mLs by mouth 3 (three) times daily as needed for Cough. 180 mL 0    hydrocodone-acetaminophen 5-325mg (NORCO) 5-325 mg per tablet Take 1 tablet by mouth every 6 (six) hours as needed for Pain. 12 tablet 0    KLOR-CON M20 20 mEq tablet TAKE ONE TABLET BY MOUTH TWICE DAILY 180 tablet 2    levothyroxine (SYNTHROID) 75 MCG tablet Take 1 tablet (75 mcg total) by mouth before breakfast. 90 tablet 3    losartan (COZAAR) 100 MG tablet Take 1 tablet (100 mg total) by mouth once daily. 90 tablet 2    metoprolol succinate (TOPROL-XL) 200 MG 24 hr tablet Take 1 tablet (200 mg total) by mouth once daily. 90 tablet 2    needle, disp, 25 gauge (BD REGULAR BEVEL NEEDLES) 25 gauge x 5/8" Ndle Pt to inject  each 2    tramadol (ULTRAM) 50 mg tablet Take 1 tablet (50 mg total) by mouth every 8 (eight) hours as needed for Pain. 90 tablet 0     Current Facility-Administered Medications on File Prior to Visit   Medication Dose Route Frequency Provider Last Rate Last Dose    0.9%  NaCl infusion (for blood administration)   Intravenous Once Adryan Hernandez DO, FACP        acetaminophen tablet 650 mg  650 mg Oral PRN Adryan Hernandez DO, FACP        diphenhydrAMINE capsule " "25 mg  25 mg Oral PRN Adryan Hernandez DO, FACDEBI        furosemide injection 20 mg  20 mg Intravenous PRN Adryan Hernandez DO, FACDEBI        furosemide injection 20 mg  20 mg Intravenous PRN Adryan Hernandez DO, FACP        potassium iodide tablet 130 mg  130 mg Oral On Call Procedure Adryan Hernandez DO, FACDEBI            Objective:    Physical Exam   Constitutional: He is oriented to person, place, and time. No distress.   Cachexia - bitemporal wasting   HENT:   Head: Normocephalic and atraumatic.   Mouth/Throat: No oropharyngeal exudate.   Eyes: EOM are normal. No scleral icterus.   Neck: Normal range of motion. Neck supple. JVD (just 3cm above clavicle at 90 degrees) present. No thyromegaly present.   Cardiovascular: Normal rate and regular rhythm.  Exam reveals no gallop and no friction rub.    Murmur heard.  Pulmonary/Chest: Effort normal and breath sounds normal. No respiratory distress. He has no wheezes.   Abdominal: Soft. He exhibits distension. There is no tenderness.   Musculoskeletal: He exhibits edema (pitting dependent edema to abdomen).   Neurological: He is alert and oriented to person, place, and time.   Skin: Skin is warm and dry. He is not diaphoretic. No erythema.   /77 (BP Location: Left arm, Patient Position: Sitting, BP Method: Large (Automatic))   Pulse 89   Ht 5' 9" (1.753 m)   Wt 83.8 kg (184 lb 11.9 oz)   BMI 27.28 kg/m²     TTE 3/28/2018  Aorta: The aortic root is normal in size, measuring 3.4 cm at sinotubular junction and 3.7 cm at Sinuses of Valsalva. The proximal ascending aorta is normal in size, measuring 3.8 cm across.   Left Atrium: The left atrial volume index is severely enlarged, measuring 57.31 cc/m2.   Left Ventricle: The left ventricle is normal in size, with an end-diastolic diameter of 5.0 cm, and an end-systolic diameter of 3.9 cm. LV wall thickness is normal, with the septum and the posterior wall each measuring 1.0 cm across. Relative wall   thickness " was normal at 0.40, and the LV mass index was 107.1 g/m2 consistent with normal left ventricular mass. There are no regional wall motion abnormalities. Left ventricular systolic function appears mildly to moderately depressed. Visually   estimated ejection fraction is 40-45%. The LV Doppler derived stroke volume equals 59.0 ccs.   Diastolic indices: E wave velocity 0.5 m/s, E/A ratio 0.6,  msec., E/e' ratio(avg) 6. There is diastolic dysfunction secondary to relaxation abnormality.   Right Atrium: The right atrium is normal in size, measuring 5.8 cm in length and 4.0 cm in width in the apical view.   Right Ventricle: The right ventricle is normal in size measuring 3.3 cm at the base in the apical right ventricle-focused view. Global right ventricular systolic function appears normal. Tricuspid annular plane systolic excursion (TAPSE) is 2.5 cm. The   estimated PA systolic pressure is 25 mmHg.   Aortic Valve:  The aortic valve is normal in structure with normal leaflet mobility. Additionally, there is mild aortic regurgitation.   Mitral Valve:  The mitral valve is normal in structure with normal leaflet mobility.  Tricuspid Valve:  The tricuspid valve is normal in structure with normal leaflet mobility.   Pulmonary Valve:  The pulmonic valve is not well seen.   IVC: IVC is enlarged but collapses > 50% with a sniff, suggesting intermediate right atrial pressure of 8 mmHg.   Atrial Septum: The atrial septum is bulging to the right.   Intracavitary: There is no evidence of pericardial effusion, intracavity mass, thrombi, or vegetation.   CONCLUSIONS     1 - Severe left atrial enlargement.     2 - Mildly to moderately depressed left ventricular systolic function (EF 40-45%).     3 - Impaired LV relaxation, normal LAP (grade 1 diastolic dysfunction).     4 - Normal right ventricular systolic function .     5 - Mild aortic regurgitation.     6 - The estimated PA systolic pressure is 25 mmHg.     7 - Intermediate  central venous pressure.         Assessment:       1. Lower extremity edema    2. Dilated cardiomyopathy    3. Acute deep vein thrombosis (DVT) of femoral vein of left lower extremity    4. Essential hypertension    5. Hyperlipidemia, unspecified hyperlipidemia type         Plan:       Mr. Sandoval is a 62 y.o. Man with chronic kidney disease, DVT lower extremity not on anti-coagulation, HTN, HLD, metastatic neuroendocrine tumor, chronic anemia - who presents for clinic for evaluation of bilateral lower extremity edema recently diagnosed by primary care physician.    #Lower extremity edema/Dilated cardiomyopathy - Echo performed later in the day consistent with newly depressed ejection fraction - 40-45%, global without wall motion abnormality.  Still also with lower extremity edema, no orthopnea, no PND.  Etiology of cardiomyopathy likely related to chronic illness - chronic anemia vs. Chemotherapy.  Based on clinical presentation and morphology of echo ischemic less likely.  Also no clear benefit to work up given that he would likely not be a candidate for revascularization based on chronic Gi related anemia, lifeexpetancy, active malignancy.  Unfortunately he is already on aggressive medical therapy with max dose ARB and b-blocker, no room for aldactone with CKD.  Unclear what component of his edema is related to CHF vs. Malignancy vs. IVC thrombosis vs. Liver infiltration vs. Low albumin.  -Trial of metolazone PRN x 3 days, recheck BMP on Monday - will give patient a call.  -Cont lasix  -Cont Toprol 200  -Cont Losartan 100

## 2018-03-29 NOTE — PROGRESS NOTES
I have personally taken the history and examined this patient and agree with the Fellow's note as stated above.    Edema multifactorial despite being on moderately high dose diuretic, BB, and ace-I/arb combo.  Agree with trial metolazone  Not good candidate for spironolactone.

## 2018-04-09 ENCOUNTER — LAB VISIT (OUTPATIENT)
Dept: LAB | Facility: OTHER | Age: 63
End: 2018-04-09
Attending: INTERNAL MEDICINE
Payer: MEDICARE

## 2018-04-09 DIAGNOSIS — C7B.8 SECONDARY NEUROENDOCRINE TUMOR OF LIVER: ICD-10-CM

## 2018-04-09 LAB
ALBUMIN SERPL BCP-MCNC: 2.9 G/DL
ALP SERPL-CCNC: 87 U/L
ALT SERPL W/O P-5'-P-CCNC: 12 U/L
ANION GAP SERPL CALC-SCNC: 10 MMOL/L
AST SERPL-CCNC: 21 U/L
BILIRUB SERPL-MCNC: 2.1 MG/DL
BUN SERPL-MCNC: 63 MG/DL
CALCIUM SERPL-MCNC: 9.6 MG/DL
CHLORIDE SERPL-SCNC: 100 MMOL/L
CO2 SERPL-SCNC: 35 MMOL/L
CREAT SERPL-MCNC: 2.1 MG/DL
EST. GFR  (AFRICAN AMERICAN): 37 ML/MIN/1.73 M^2
EST. GFR  (NON AFRICAN AMERICAN): 32 ML/MIN/1.73 M^2
GLUCOSE SERPL-MCNC: 99 MG/DL
POTASSIUM SERPL-SCNC: 3.4 MMOL/L
PROT SERPL-MCNC: 6.4 G/DL
SODIUM SERPL-SCNC: 145 MMOL/L

## 2018-04-09 PROCEDURE — 36415 COLL VENOUS BLD VENIPUNCTURE: CPT

## 2018-04-09 PROCEDURE — 80053 COMPREHEN METABOLIC PANEL: CPT

## 2018-05-05 RX ORDER — POTASSIUM CHLORIDE 1500 MG/1
TABLET, EXTENDED RELEASE ORAL
Qty: 180 TABLET | Refills: 2 | Status: SHIPPED | OUTPATIENT
Start: 2018-05-05 | End: 2019-03-10 | Stop reason: SDUPTHER

## 2018-05-15 ENCOUNTER — OFFICE VISIT (OUTPATIENT)
Dept: CARDIOLOGY | Facility: CLINIC | Age: 63
End: 2018-05-15
Payer: MEDICARE

## 2018-05-15 ENCOUNTER — LAB VISIT (OUTPATIENT)
Dept: LAB | Facility: HOSPITAL | Age: 63
End: 2018-05-15
Payer: MEDICARE

## 2018-05-15 VITALS
DIASTOLIC BLOOD PRESSURE: 81 MMHG | HEIGHT: 69 IN | WEIGHT: 186.94 LBS | SYSTOLIC BLOOD PRESSURE: 151 MMHG | BODY MASS INDEX: 27.69 KG/M2 | HEART RATE: 85 BPM

## 2018-05-15 DIAGNOSIS — M79.89 LOCALIZED SWELLING OF LOWER EXTREMITY: ICD-10-CM

## 2018-05-15 DIAGNOSIS — N18.30 STAGE 3 CHRONIC KIDNEY DISEASE: ICD-10-CM

## 2018-05-15 DIAGNOSIS — C7A.8 NEUROENDOCRINE CARCINOMA, UNKNOWN PRIMARY SITE: ICD-10-CM

## 2018-05-15 DIAGNOSIS — I10 ESSENTIAL HYPERTENSION: ICD-10-CM

## 2018-05-15 DIAGNOSIS — M79.89 LOCALIZED SWELLING OF LOWER EXTREMITY: Primary | ICD-10-CM

## 2018-05-15 DIAGNOSIS — E78.5 HYPERLIPIDEMIA, UNSPECIFIED HYPERLIPIDEMIA TYPE: ICD-10-CM

## 2018-05-15 LAB
ANION GAP SERPL CALC-SCNC: 9 MMOL/L
BUN SERPL-MCNC: 42 MG/DL
CALCIUM SERPL-MCNC: 9 MG/DL
CHLORIDE SERPL-SCNC: 108 MMOL/L
CO2 SERPL-SCNC: 28 MMOL/L
CREAT SERPL-MCNC: 1.9 MG/DL
EST. GFR  (AFRICAN AMERICAN): 42.1 ML/MIN/1.73 M^2
EST. GFR  (NON AFRICAN AMERICAN): 36.4 ML/MIN/1.73 M^2
GLUCOSE SERPL-MCNC: 85 MG/DL
MAGNESIUM SERPL-MCNC: 2 MG/DL
POTASSIUM SERPL-SCNC: 3.9 MMOL/L
SODIUM SERPL-SCNC: 145 MMOL/L

## 2018-05-15 PROCEDURE — 3079F DIAST BP 80-89 MM HG: CPT | Mod: CPTII,GC,S$GLB, | Performed by: INTERNAL MEDICINE

## 2018-05-15 PROCEDURE — 3077F SYST BP >= 140 MM HG: CPT | Mod: CPTII,GC,S$GLB, | Performed by: INTERNAL MEDICINE

## 2018-05-15 PROCEDURE — 3008F BODY MASS INDEX DOCD: CPT | Mod: CPTII,GC,S$GLB, | Performed by: INTERNAL MEDICINE

## 2018-05-15 PROCEDURE — 36415 COLL VENOUS BLD VENIPUNCTURE: CPT

## 2018-05-15 PROCEDURE — 80048 BASIC METABOLIC PNL TOTAL CA: CPT

## 2018-05-15 PROCEDURE — 83735 ASSAY OF MAGNESIUM: CPT

## 2018-05-15 PROCEDURE — 99499 UNLISTED E&M SERVICE: CPT | Mod: S$GLB,,, | Performed by: INTERNAL MEDICINE

## 2018-05-15 PROCEDURE — 99213 OFFICE O/P EST LOW 20 MIN: CPT | Mod: GC,S$GLB,, | Performed by: INTERNAL MEDICINE

## 2018-05-15 PROCEDURE — 99999 PR PBB SHADOW E&M-EST. PATIENT-LVL IV: CPT | Mod: PBBFAC,GC,, | Performed by: INTERNAL MEDICINE

## 2018-05-15 NOTE — PROGRESS NOTES
Subjective:    Patient ID:  Ronnie Sandoval is a 62 y.o. male who presents for evaluation of Leg Swelling    HPI Mr. Sandoval is a 62 y.o. Man with chronic kidney disease, DVT lower extremity not on anti-coagulation, HTN, HLD, metastatic neuroendocrine tumor, chronic anemia - who presents for clinic follow up of bilateral lower extremity edema.      LOWER EXTREMITY EDEMA HISTORY:  He reports lower extremity chronically over the past year, however worsened over the past 4-5 months.  No orthopnea, no chest pain, no palpitations, no syncope.  No PND.  He also has abdominal swelling which has worsened over the same time frame.  No history of heart failure.  He is unsure of the type of chemotherapy he received at [facility] - is on metoprolol and losartan for hypertension. No history of coronary artery disease. He has been on lasix prescribed by PCP    NEUROENDOCRINE TUMOR HISTORY:  Neuroendocrine tumor first diagnosed several years ago, later confirmed to have primary in rectum but diagnosed with inguinal lymphadenopathy.  Was on cisplatin and etoposide here and something prior to this at [facility].  Last saw heme-onc this past fall and did not want any further chemotherapy at that time, has not seen since.  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 Taken off of lovenox by hemo-onc with dark stools and significant anemia - likely related to malignancy.      INTERVAL HISTORY:   At our first visit 2 months ago we prescribed metolazone PRN for lower extremity swelling thinking that the lasix was ineffective and he could still have some intravascular volume.  He reports using the metolazone at least once a week but has not noticed a significant difference, and continues to have lower extremity swelling and as well as abdominal distention.  BMP was planned two weeks after starting this but not obtained, BMP obtained one month late with rising BUN, alklalosis, and  "worsening creatinine.  He also had TTE with globally depressed LVEF in mid 40s.  No treatment for malignancy planned at this time due to patient wishes.      Review of Systems   Constitution: Positive for weakness and malaise/fatigue. Negative for diaphoresis, fever and night sweats.   HENT: Negative.    Eyes: Negative.    Cardiovascular: Positive for dyspnea on exertion and leg swelling. Negative for chest pain, claudication, cyanosis, irregular heartbeat, near-syncope, orthopnea, palpitations, paroxysmal nocturnal dyspnea and syncope.   Respiratory: Positive for shortness of breath. Negative for sleep disturbances due to breathing, sputum production and wheezing.    Endocrine: Negative.    Hematologic/Lymphatic: Negative.    Musculoskeletal: Negative.    Gastrointestinal: Positive for bloating.   Genitourinary: Negative.    Psychiatric/Behavioral: Negative.      Current Outpatient Prescriptions on File Prior to Visit   Medication Sig Dispense Refill    furosemide (LASIX) 80 MG tablet Take 1 tablet (80 mg total) by mouth 2 (two) times daily. 180 tablet 1    hydrocodone-acetaminophen 5-325mg (NORCO) 5-325 mg per tablet Take 1 tablet by mouth every 6 (six) hours as needed for Pain. 12 tablet 0    KLOR-CON M20 20 mEq tablet TAKE ONE TABLET BY MOUTH TWICE DAILY 180 tablet 2    levothyroxine (SYNTHROID) 75 MCG tablet Take 1 tablet (75 mcg total) by mouth before breakfast. 90 tablet 3    losartan (COZAAR) 100 MG tablet Take 1 tablet (100 mg total) by mouth once daily. 90 tablet 2    metOLazone (ZAROXOLYN) 2.5 MG tablet Take 1 tablet (2.5 mg total) by mouth daily as needed. Take 30 minutes prior to lasix x 3 days, then only as needed for weight gain/edema 30 tablet 11    metoprolol succinate (TOPROL-XL) 200 MG 24 hr tablet Take 1 tablet (200 mg total) by mouth once daily. 90 tablet 2    needle, disp, 25 gauge (BD REGULAR BEVEL NEEDLES) 25 gauge x 5/8" Ndle Pt to inject  each 2    tramadol (ULTRAM) 50 mg " "tablet Take 1 tablet (50 mg total) by mouth every 8 (eight) hours as needed for Pain. 90 tablet 0     Current Facility-Administered Medications on File Prior to Visit   Medication Dose Route Frequency Provider Last Rate Last Dose    0.9%  NaCl infusion (for blood administration)   Intravenous Once Adryan Hernandez DO, FACP        acetaminophen tablet 650 mg  650 mg Oral PRN Adryan Hernandez DO, FACP        diphenhydrAMINE capsule 25 mg  25 mg Oral PRN Adryan Hernandez DO, FACP        furosemide injection 20 mg  20 mg Intravenous PRN Adryan Hernandez DO, FACP        furosemide injection 20 mg  20 mg Intravenous PRN Adryan Hernandez DO, FACP        potassium iodide tablet 130 mg  130 mg Oral On Call Procedure Adryan Hernandez DO, FACP            Objective:    Physical Exam   Constitutional: He is oriented to person, place, and time. No distress.   Cachexia - bitemporal wasting   HENT:   Head: Normocephalic and atraumatic.   Mouth/Throat: No oropharyngeal exudate.   Eyes: EOM are normal. No scleral icterus.   Neck: Normal range of motion. Neck supple. JVD (unchanged, prominent v-waves at 90 degrees to mid neck) present. No thyromegaly present.   Cardiovascular: Normal rate and regular rhythm.  Exam reveals no gallop and no friction rub.    Murmur heard.  Pulmonary/Chest: Effort normal and breath sounds normal. No respiratory distress. He has no wheezes.   Abdominal: Soft. He exhibits distension. There is no tenderness.   Musculoskeletal: He exhibits edema (pitting dependent edema to abdomen).   Neurological: He is alert and oriented to person, place, and time.   Skin: Skin is warm and dry. He is not diaphoretic. No erythema.   BP (!) 151/81 (BP Location: Left arm, Patient Position: Sitting, BP Method: Large (Automatic))   Pulse 85   Ht 5' 9" (1.753 m)   Wt 84.8 kg (186 lb 15.2 oz)   BMI 27.61 kg/m²     TTE 3/28/2018  Aorta: The aortic root is normal in size, measuring 3.4 cm at sinotubular " junction and 3.7 cm at Sinuses of Valsalva. The proximal ascending aorta is normal in size, measuring 3.8 cm across.   Left Atrium: The left atrial volume index is severely enlarged, measuring 57.31 cc/m2.   Left Ventricle: The left ventricle is normal in size, with an end-diastolic diameter of 5.0 cm, and an end-systolic diameter of 3.9 cm. LV wall thickness is normal, with the septum and the posterior wall each measuring 1.0 cm across. Relative wall   thickness was normal at 0.40, and the LV mass index was 107.1 g/m2 consistent with normal left ventricular mass. There are no regional wall motion abnormalities. Left ventricular systolic function appears mildly to moderately depressed. Visually   estimated ejection fraction is 40-45%. The LV Doppler derived stroke volume equals 59.0 ccs.   Diastolic indices: E wave velocity 0.5 m/s, E/A ratio 0.6,  msec., E/e' ratio(avg) 6. There is diastolic dysfunction secondary to relaxation abnormality.   Right Atrium: The right atrium is normal in size, measuring 5.8 cm in length and 4.0 cm in width in the apical view.   Right Ventricle: The right ventricle is normal in size measuring 3.3 cm at the base in the apical right ventricle-focused view. Global right ventricular systolic function appears normal. Tricuspid annular plane systolic excursion (TAPSE) is 2.5 cm. The   estimated PA systolic pressure is 25 mmHg.   Aortic Valve:  The aortic valve is normal in structure with normal leaflet mobility. Additionally, there is mild aortic regurgitation.   Mitral Valve:  The mitral valve is normal in structure with normal leaflet mobility.  Tricuspid Valve:  The tricuspid valve is normal in structure with normal leaflet mobility.   Pulmonary Valve:  The pulmonic valve is not well seen.   IVC: IVC is enlarged but collapses > 50% with a sniff, suggesting intermediate right atrial pressure of 8 mmHg.   Atrial Septum: The atrial septum is bulging to the right.   Intracavitary: There  is no evidence of pericardial effusion, intracavity mass, thrombi, or vegetation.   CONCLUSIONS     1 - Severe left atrial enlargement.     2 - Mildly to moderately depressed left ventricular systolic function (EF 40-45%).     3 - Impaired LV relaxation, normal LAP (grade 1 diastolic dysfunction).     4 - Normal right ventricular systolic function .     5 - Mild aortic regurgitation.     6 - The estimated PA systolic pressure is 25 mmHg.     7 - Intermediate central venous pressure.         Assessment:       1. Localized swelling of lower extremity    2. Hyperlipidemia, unspecified hyperlipidemia type    3. Essential hypertension    4. Stage 3 chronic kidney disease    5. Neuroendocrine carcinoma, unknown primary site         Plan:       Mr. Sandoval is a 62 y.o. Man with chronic kidney disease, DVT lower extremity not on anti-coagulation, HTN, HLD, metastatic neuroendocrine tumor, chronic anemia - who presents for clinic for follow up of bilateral lower extremity edema.    #Lower extremity edema- Multifactorial with significant tumor burden of liver, complete IVC occlusion with thrombosis, hypoalbuminemia with metastatic cancer.  He has JVD present similar to last time but this is the only sign other than edema that he is intravascularly overloaded.  Given signs of dehydration on labs will stop metolazone, checking bmp, continuing lasix.  -continue lasix  -stop metolazole  -check BMP    #Cardiomyopathy -Etiology of cardiomyopathy likely related to chronic anemia vs. chemotherapy.  Based on clinical presentation and morphology of echo ischemic less likely.  Also no clear benefit to work up given that he would likely not be a candidate for revascularization based on chronic Gi related anemia, lifeexpetancy, active malignancy.  Unfortunately he is already on aggressive medical therapy with max dose ARB and b-blocker, no room for aldactone with CKD.  Unclear what component of his edema is related to CHF vs. Malignancy  vs. IVC thrombosis.  -Cont toprol 200  -cont losartan 100

## 2018-05-15 NOTE — PROGRESS NOTES
I have personally taken the history and examined this patient and agree with the Fellow's note as stated above.    Sad case - I agree that metolazone is no longer a good idea.

## 2018-07-06 DIAGNOSIS — N18.9 CHRONIC KIDNEY DISEASE, UNSPECIFIED CKD STAGE: ICD-10-CM

## 2018-09-06 ENCOUNTER — HOSPITAL ENCOUNTER (INPATIENT)
Facility: OTHER | Age: 63
LOS: 1 days | Discharge: HOME | DRG: 948 | End: 2018-09-07
Attending: EMERGENCY MEDICINE | Admitting: EMERGENCY MEDICINE
Payer: MEDICARE

## 2018-09-06 DIAGNOSIS — M79.89 LEG SWELLING: ICD-10-CM

## 2018-09-06 DIAGNOSIS — R06.02 SHORTNESS OF BREATH: ICD-10-CM

## 2018-09-06 DIAGNOSIS — R18.8 OTHER ASCITES: Primary | ICD-10-CM

## 2018-09-06 PROBLEM — I50.32 CHRONIC DIASTOLIC HEART FAILURE: Status: ACTIVE | Noted: 2018-09-06

## 2018-09-06 PROBLEM — D63.8 ANEMIA OF CHRONIC DISEASE: Status: ACTIVE | Noted: 2018-09-06

## 2018-09-06 LAB
ABO + RH BLD: NORMAL
ACANTHOCYTES BLD QL SMEAR: PRESENT
ALBUMIN SERPL BCP-MCNC: 3.2 G/DL
ALP SERPL-CCNC: 104 U/L
ALT SERPL W/O P-5'-P-CCNC: 12 U/L
AMMONIA PLAS-SCNC: 84 UMOL/L
ANION GAP SERPL CALC-SCNC: 12 MMOL/L
ANISOCYTOSIS BLD QL SMEAR: SLIGHT
APTT BLDCRRT: 24.9 SEC
AST SERPL-CCNC: 26 U/L
BASOPHILS # BLD AUTO: 0.05 K/UL
BASOPHILS NFR BLD: 0.9 %
BILIRUB SERPL-MCNC: 2.5 MG/DL
BLD GP AB SCN CELLS X3 SERPL QL: NORMAL
BLD PROD TYP BPU: NORMAL
BLD PROD TYP BPU: NORMAL
BLOOD UNIT EXPIRATION DATE: NORMAL
BLOOD UNIT EXPIRATION DATE: NORMAL
BLOOD UNIT TYPE CODE: 5100
BLOOD UNIT TYPE CODE: 5100
BLOOD UNIT TYPE: NORMAL
BLOOD UNIT TYPE: NORMAL
BNP SERPL-MCNC: 1793 PG/ML
BUN SERPL-MCNC: 46 MG/DL
CALCIUM SERPL-MCNC: 9.4 MG/DL
CHLORIDE SERPL-SCNC: 104 MMOL/L
CO2 SERPL-SCNC: 29 MMOL/L
CODING SYSTEM: NORMAL
CODING SYSTEM: NORMAL
CREAT SERPL-MCNC: 2.1 MG/DL
DIFFERENTIAL METHOD: ABNORMAL
DISPENSE STATUS: NORMAL
DISPENSE STATUS: NORMAL
EOSINOPHIL # BLD AUTO: 0.2 K/UL
EOSINOPHIL NFR BLD: 3 %
ERYTHROCYTE [DISTWIDTH] IN BLOOD BY AUTOMATED COUNT: 16 %
EST. GFR  (AFRICAN AMERICAN): 37 ML/MIN/1.73 M^2
EST. GFR  (NON AFRICAN AMERICAN): 32 ML/MIN/1.73 M^2
GLUCOSE SERPL-MCNC: 88 MG/DL
HCT VFR BLD AUTO: 23 %
HGB BLD-MCNC: 6.6 G/DL
HYPOCHROMIA BLD QL SMEAR: ABNORMAL
INR PPP: 1.2
LACTATE SERPL-SCNC: 1.6 MMOL/L
LYMPHOCYTES # BLD AUTO: 1.2 K/UL
LYMPHOCYTES NFR BLD: 20.1 %
MCH RBC QN AUTO: 33 PG
MCHC RBC AUTO-ENTMCNC: 28.7 G/DL
MCV RBC AUTO: 115 FL
MONOCYTES # BLD AUTO: 0.4 K/UL
MONOCYTES NFR BLD: 7.5 %
NEUTROPHILS # BLD AUTO: 3.9 K/UL
NEUTROPHILS NFR BLD: 68.5 %
NUM UNITS TRANS PACKED RBC: NORMAL
NUM UNITS TRANS PACKED RBC: NORMAL
OVALOCYTES BLD QL SMEAR: ABNORMAL
PLATELET # BLD AUTO: 167 K/UL
PLATELET BLD QL SMEAR: ABNORMAL
PMV BLD AUTO: 11.6 FL
POIKILOCYTOSIS BLD QL SMEAR: ABNORMAL
POLYCHROMASIA BLD QL SMEAR: ABNORMAL
POTASSIUM SERPL-SCNC: 4.4 MMOL/L
PROT SERPL-MCNC: 6.8 G/DL
PROTHROMBIN TIME: 13 SEC
RBC # BLD AUTO: 2 M/UL
SCHISTOCYTES BLD QL SMEAR: ABNORMAL
SCHISTOCYTES BLD QL SMEAR: PRESENT
SODIUM SERPL-SCNC: 145 MMOL/L
SPHEROCYTES BLD QL SMEAR: ABNORMAL
WBC # BLD AUTO: 5.71 K/UL

## 2018-09-06 PROCEDURE — 36430 TRANSFUSION BLD/BLD COMPNT: CPT

## 2018-09-06 PROCEDURE — 80053 COMPREHEN METABOLIC PANEL: CPT

## 2018-09-06 PROCEDURE — 93010 ELECTROCARDIOGRAM REPORT: CPT | Mod: ,,, | Performed by: INTERNAL MEDICINE

## 2018-09-06 PROCEDURE — 86985 SPLIT BLOOD OR PRODUCTS: CPT

## 2018-09-06 PROCEDURE — 87040 BLOOD CULTURE FOR BACTERIA: CPT

## 2018-09-06 PROCEDURE — 86901 BLOOD TYPING SEROLOGIC RH(D): CPT

## 2018-09-06 PROCEDURE — 63600175 PHARM REV CODE 636 W HCPCS: Performed by: INTERNAL MEDICINE

## 2018-09-06 PROCEDURE — 94761 N-INVAS EAR/PLS OXIMETRY MLT: CPT

## 2018-09-06 PROCEDURE — 63600175 PHARM REV CODE 636 W HCPCS: Performed by: EMERGENCY MEDICINE

## 2018-09-06 PROCEDURE — 99223 1ST HOSP IP/OBS HIGH 75: CPT | Mod: ,,, | Performed by: INTERNAL MEDICINE

## 2018-09-06 PROCEDURE — P9011 BLOOD SPLIT UNIT: HCPCS

## 2018-09-06 PROCEDURE — P9038 RBC IRRADIATED: HCPCS

## 2018-09-06 PROCEDURE — 83605 ASSAY OF LACTIC ACID: CPT

## 2018-09-06 PROCEDURE — 85025 COMPLETE CBC W/AUTO DIFF WBC: CPT

## 2018-09-06 PROCEDURE — 96374 THER/PROPH/DIAG INJ IV PUSH: CPT | Mod: 59

## 2018-09-06 PROCEDURE — 85610 PROTHROMBIN TIME: CPT

## 2018-09-06 PROCEDURE — 93005 ELECTROCARDIOGRAM TRACING: CPT

## 2018-09-06 PROCEDURE — 86920 COMPATIBILITY TEST SPIN: CPT

## 2018-09-06 PROCEDURE — 99285 EMERGENCY DEPT VISIT HI MDM: CPT | Mod: 25

## 2018-09-06 PROCEDURE — 83880 ASSAY OF NATRIURETIC PEPTIDE: CPT

## 2018-09-06 PROCEDURE — 85730 THROMBOPLASTIN TIME PARTIAL: CPT

## 2018-09-06 PROCEDURE — 82140 ASSAY OF AMMONIA: CPT

## 2018-09-06 PROCEDURE — 11000001 HC ACUTE MED/SURG PRIVATE ROOM

## 2018-09-06 RX ORDER — TRAMADOL HYDROCHLORIDE 50 MG/1
50 TABLET ORAL EVERY 8 HOURS PRN
Status: DISCONTINUED | OUTPATIENT
Start: 2018-09-06 | End: 2018-09-07 | Stop reason: HOSPADM

## 2018-09-06 RX ORDER — LEVOTHYROXINE SODIUM 75 UG/1
75 TABLET ORAL
Status: DISCONTINUED | OUTPATIENT
Start: 2018-09-07 | End: 2018-09-07 | Stop reason: HOSPADM

## 2018-09-06 RX ORDER — METOPROLOL SUCCINATE 50 MG/1
200 TABLET, EXTENDED RELEASE ORAL DAILY
Status: DISCONTINUED | OUTPATIENT
Start: 2018-09-07 | End: 2018-09-07 | Stop reason: HOSPADM

## 2018-09-06 RX ORDER — FUROSEMIDE 10 MG/ML
40 INJECTION INTRAMUSCULAR; INTRAVENOUS ONCE
Status: COMPLETED | OUTPATIENT
Start: 2018-09-06 | End: 2018-09-06

## 2018-09-06 RX ORDER — FUROSEMIDE 10 MG/ML
40 INJECTION INTRAMUSCULAR; INTRAVENOUS 2 TIMES DAILY
Status: DISCONTINUED | OUTPATIENT
Start: 2018-09-07 | End: 2018-09-07 | Stop reason: HOSPADM

## 2018-09-06 RX ORDER — FUROSEMIDE 10 MG/ML
80 INJECTION INTRAMUSCULAR; INTRAVENOUS
Status: COMPLETED | OUTPATIENT
Start: 2018-09-06 | End: 2018-09-06

## 2018-09-06 RX ORDER — HYDROCODONE BITARTRATE AND ACETAMINOPHEN 500; 5 MG/1; MG/1
TABLET ORAL
Status: DISCONTINUED | OUTPATIENT
Start: 2018-09-06 | End: 2018-09-07 | Stop reason: HOSPADM

## 2018-09-06 RX ADMIN — FUROSEMIDE 40 MG: 10 INJECTION, SOLUTION INTRAVENOUS at 05:09

## 2018-09-06 RX ADMIN — FUROSEMIDE 80 MG: 10 INJECTION, SOLUTION INTRAVENOUS at 01:09

## 2018-09-06 NOTE — ED NOTES
Blood consent signed by Dr. Maciel and pt. Dr. Maciel at bedside explaining blood transfusion and answering any questions that pt and pt's wife has. Pt and wife denies any further questions or concerns and consent for blood transfusion.

## 2018-09-06 NOTE — ED NOTES
Type & Screen held in lab from previous blood collecting.  Brendan, jose and lab requisition sent at this time.

## 2018-09-06 NOTE — ED TRIAGE NOTES
Pt presents after Home health nurse told him he looked like his H/H was low. Pt reports increased weakness. Reports hx of liver CA that he stopped tx 2 years ago and doesn't seem to have any doctor monitoring the progression of the CA. Pt reports a gradual increase in abd girth with a more significant increase over the last month. Pt appears pale with large abd. Pt with significant edema to lower extremities.

## 2018-09-06 NOTE — ED PROVIDER NOTES
"Encounter Date: 9/6/2018    SCRIBE #1 NOTE: I, R[place] Carbajal, am scribing for, and in the presence of, Dr. Padilla.       History     Chief Complaint   Patient presents with    Low H/H     Pt states "the home health nurse told me to come here because my blood count is low." Pt c/o weakness.    Leg Swelling     Pt c/o increased lower extremity swelling, states "They're more swollen than they usually are."    Abdominal Swelling     Pt also c/o increased abdominal swelling X 1 month, PMH liver cancer.     Time seen by provider: 10:40 AM    This is a 63 y.o. 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 Pt reports abdominal distention for months and leg swelling for weeks. Pt has been compliant with lasix BID, though it has been increasingly ineffective. Pt is still urinating frequently secondary to lasix. Per wife, pt has also been intermittently confused and slow to answer questions lately. Pt was seen by [facility] NP today, who recommended ED visit for possible transfusion. He had labs drawn today , though no results yet. He is seen by his provider annually. Pt was diagnosed with Liver CA a few years ago, but discontinued treatment as it was making him feel worse; he reports feeling better since discontinuing treatment though pt has not followed up with oncology or GI in some time. Pt is being followed by Dr. Hernandez. He reports no fever, chills, N/V/D, abdominal pain, chest pain, SOB, dizziness, lightheadedness, weakness, dysuria, urinary urgency, and back pain.      The history is provided by the patient and the spouse.     Review of patient's allergies indicates:  No Known Allergies  Past Medical History:   Diagnosis Date    Acute deep vein thrombosis (DVT) of femoral vein of left lower extremity 10/2/2017    Anemia "     Anemia in neoplastic disease 10/2/2017    Chemotherapy follow-up examination 5/26/2015    Colon polyps     Encounter for blood transfusion     Hx antineoplastic chemotherapy 12/2014-1/2015     16 + Cisplatin    Hyperlipidemia     Hypertension     Increased bilirubin level 10/2/2017    Malignant carcinoid tumor of rectum 5/26/2015    Malignant carcinoid tumor of the rectum     Neuroendocrine carcinoma, unknown primary site 4/14/2015    Papillary thyroid carcinoma 4/14/2015    Secondary malignant neoplasm of bone 4/14/2015    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    left groin mass; Ki 67-20%    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    Secondary neuroendocrine tumor of liver(209.72) 11/2014    Stage 3 chronic kidney disease 10/2/2017    Thrombocytopenia 10/2/2017    Urinary tract infection      Past Surgical History:   Procedure Laterality Date    COLONOSCOPY      excision of mass      left mastoid    LIVER BIOPSY  11/14    Mass in grion excised  8/2013    Carcinoid    Mass in groin excised  6/2013    Carcinoid    THYROIDECTOMY  2014    [place] - Carcinoid     Family History   Problem Relation Age of Onset    Leukemia Father     Cancer Father         leukemia    Stroke Sister     Cancer Brother         colon    Colon cancer Neg Hx     Esophageal cancer Neg Hx      Social History     Tobacco Use    Smoking status: Never Smoker    Smokeless tobacco: Never Used    Tobacco comment: Quit in 1979   Substance Use Topics    Alcohol use: No    Drug use: No     Review of Systems   Constitutional: Negative for chills and fever.   HENT: Negative for congestion, rhinorrhea and sore throat.    Respiratory: Negative for cough and shortness of breath.    Cardiovascular: Positive for leg swelling. Negative for chest pain and palpitations.   Gastrointestinal: Positive for abdominal distention. Negative for abdominal pain, diarrhea, nausea and vomiting.    Endocrine: Negative for polyuria.   Genitourinary: Positive for frequency (secondary to lasix). Negative for decreased urine volume, difficulty urinating and dysuria.   Musculoskeletal: Negative for back pain.   Skin: Negative for rash.   Allergic/Immunologic: Negative for immunocompromised state.   Neurological: Negative for dizziness, weakness and light-headedness.   Hematological: Does not bruise/bleed easily.   Psychiatric/Behavioral: Positive for confusion. Negative for agitation.       Physical Exam     Initial Vitals [09/06/18 1018]   BP Pulse Resp Temp SpO2   (!) 140/72 83 18 98.8 °F (37.1 °C) 99 %      MAP       --         Physical Exam    Nursing note and vitals reviewed.  Constitutional: He appears well-developed and well-nourished. No distress.   HENT:   Head: Normocephalic and atraumatic.   Right Ear: External ear normal.   Left Ear: External ear normal.   Eyes: EOM are normal. Pupils are equal, round, and reactive to light. Scleral icterus is present.   Neck: Normal range of motion. Neck supple.   Cardiovascular: Normal rate, regular rhythm and normal heart sounds.   Pulmonary/Chest: Breath sounds normal. No respiratory distress.   Abdominal: Soft. Bowel sounds are normal. He exhibits distension. There is hepatomegaly.   Musculoskeletal: Normal range of motion.   3+ pitting edema to bilateral lower extremities.   Lymphadenopathy:     He has no cervical adenopathy.   Neurological: He is alert and oriented to person, place, and time. He has normal strength. No cranial nerve deficit or sensory deficit.   No asterixis.   Skin: Skin is warm and dry. No rash noted.   Psychiatric: He has a normal mood and affect. His behavior is normal.         ED Course   Critical Care  Date/Time: 9/6/2018 2:23 PM  Performed by: Shruti Padilla MD  Authorized by: Shruti Padilla MD   Direct patient critical care time: 10 minutes  Additional history critical care time: 5 minutes  Ordering / reviewing critical care time: 5  minutes  Documentation critical care time: 5 minutes  Consulting other physicians critical care time: 5 minutes  Total critical care time (exclusive of procedural time) : 30 minutes  Critical care time was exclusive of separately billable procedures and treating other patients and teaching time.  Critical care was necessary to treat or prevent imminent or life-threatening deterioration of the following conditions: hepatic failure and circulatory failure.  Critical care was time spent personally by me on the following activities: evaluation of patient's response to treatment, obtaining history from patient or surrogate, ordering and review of laboratory studies, pulse oximetry, review of old charts, re-evaluation of patient's condition, ordering and review of radiographic studies, ordering and performing treatments and interventions, examination of patient and development of treatment plan with patient or surrogate.        Labs Reviewed   COMPREHENSIVE METABOLIC PANEL - Abnormal; Notable for the following components:       Result Value    BUN, Bld 46 (*)     Creatinine 2.1 (*)     Albumin 3.2 (*)     Total Bilirubin 2.5 (*)     eGFR if  37 (*)     eGFR if non  32 (*)     All other components within normal limits   CBC W/ AUTO DIFFERENTIAL - Abnormal; Notable for the following components:    RBC 2.00 (*)     Hemoglobin 6.6 (*)     Hematocrit 23.0 (*)      (*)     MCH 33.0 (*)     MCHC 28.7 (*)     RDW 16.0 (*)     All other components within normal limits   AMMONIA - Abnormal; Notable for the following components:    Ammonia 84 (*)     All other components within normal limits   PROTIME-INR - Abnormal; Notable for the following components:    Prothrombin Time 13.0 (*)     All other components within normal limits   B-TYPE NATRIURETIC PEPTIDE - Abnormal; Notable for the following components:    BNP 1,793 (*)     All other components within normal limits   CULTURE, BLOOD   CULTURE,  BLOOD   LACTIC ACID, PLASMA   APTT   TYPE & SCREEN   PREPARE RBC SOFT     EKG Readings: (Independently Interpreted)   10:51 - NSR at 78 bpm. Normal axis. Normal intervals. Lateral T wave inversions. No other ST or ischemic changes.       Imaging Results          CT Abdomen Pelvis  Without Contrast (Final result)  Result time 09/06/18 13:12:46    Final result by Quinten Diego MD (09/06/18 13:12:46)                 Impression:      Enlarged heterogeneous liver with innumerable masses suggesting metastatic disease.    Moderate amount of intra-abdominal ascites with diffuse body wall anasarca.    Large destructive soft tissue mass centered in the left pubis and extending to involve the right pubis.      Electronically signed by: Quinten Diego MD  Date:    09/06/2018  Time:    13:12             Narrative:    EXAMINATION:  CT ABDOMEN PELVIS WITHOUT CONTRAST    CLINICAL HISTORY:  abdominal pain;  history of neuroendocrine tumor with left groin mass.    TECHNIQUE:  Low dose axial images, sagittal and coronal reformations were obtained from the lung bases to the pubic symphysis.  Oral contrast was not administered.    COMPARISON:  Complete abdominal ultrasound 10/04/2017    FINDINGS:  Heart: Normal size. No effusion.    Lung Bases: Subsegmental atelectasis is present in the right lung base.    Liver: Markedly enlarged and heterogeneous with numerous hypodense masses throughout the liver parenchyma.    Gallbladder: Not definitively visualized.    Bile Ducts: No definite ductal dilatation.    Pancreas: Pancreatic body and head are poorly delineated, with suggestion of masses in this lesion, though at least some of these may be originating from the liver.    Spleen: Normal.    Adrenals: Normal.    Kidneys/Ureters: No mass, hydroureteronephrosis, or nephroureterolithiasis.    Bladder: No wall thickening.    Reproductive organs: Normal.    GI Tract/Mesentery: No evidence of bowel obstruction or inflammation. Appendix is  normal.    Peritoneal Space: Moderate amount of free fluid throughout the abdomen and pelvis.    Retroperitoneum: No definite adenopathy, though close opposition of soft tissue structures and absence of intravenous contrast degrades evaluation.    Abdominal wall: Mild diffuse body wall anasarca.    Vasculature: No aneurysm.    Bones: Soft tissue mass centered within the left pubis extends to involve the right pubis and overall measures approximately 11.9 x 8.4 x 9.0 cm.                               X-Ray Chest 1 View (Final result)  Result time 09/06/18 11:32:51    Final result by Quinten Diego MD (09/06/18 11:32:51)                 Impression:      Mild elevation of the right hemidiaphragm with atelectasis at the right lung base.      Electronically signed by: Quinten Diego MD  Date:    09/06/2018  Time:    11:32             Narrative:    EXAMINATION:  XR CHEST 1 VIEW    CLINICAL HISTORY:  Shortness of breath    TECHNIQUE:  Single frontal portable view of the chest was performed.    COMPARISON:  Chest radiograph 07/29/2017    FINDINGS:  Support devices: None    There is mild elevation of the right hemidiaphragm with atelectasis at the right lung base.  Lungs are otherwise clear.  No pleural effusion or pneumothorax.    The cardiac silhouette is normal in size. The hilar and mediastinal contours are unremarkable.    No acute fracture.  Remote right 7th rib fracture is present.  DJD is present in the shoulders and spine.                              X-Rays:   Independently Interpreted Readings:   Chest X-Ray: Trachea midline. No cardiomegaly. Elevation of the R hemidiaphragm. No other effusion, edema or inflitrate.      Medical Decision Making:   History:   Old Medical Records: I decided to obtain old medical records.  Old Records Summarized: other records and records from clinic visits.  Initial Assessment:   10:40AM:   Patient is a 64 y/o M who presents to ED with leg swelling, abdominal distension.   Patient appears  well, nontoxic.  He does have abdominal distention along with hepatomegaly.  However his abdomen is nontender.  He also has significant leg swelling.  Will plan for labs, imaging, will continue to follow and reassess.  Independently Interpreted Test(s):   I have ordered and independently interpreted X-rays - see prior notes.  I have ordered and independently interpreted EKG Reading(s) - see prior notes  Clinical Tests:   Lab Tests: Ordered and Reviewed  Radiological Study: Ordered and Reviewed  Medical Tests: Ordered and Reviewed  Other:   I have discussed this case with another health care provider.    1:48 PM:    Patient's hemoglobin is 6.6, which is similar to previous measurements.  However he will need a transfusion, and will plan for 2 units.  His creatinine is 2.1 which is close to his baseline.  His CT does show a large soft tissue mass near the pelvis along with numerous liver masses and significant ascites.  Will plan to admit for further observation and management.  I updated the patient and his wife regarding the results along with plan for admission.  Patient agreeable to plan and all questions answered.    2:18 PM:  I discussed with Dr. Cox, who is agreeable to plan and all questions answered.  Will plan to admit under Dr. Cox.              Scribe Attestation:   Scribe #1: I performed the above scribed service and the documentation accurately describes the services I performed. I attest to the accuracy of the note.    Attending Attestation:           Physician Attestation for Scribe:  Physician Attestation Statement for Scribe #1: I, Dr. Padilla, reviewed documentation, as scribed by Sheryl Carbajal in my presence, and it is both accurate and complete.                    Clinical Impression:     1. Other ascites    2. Shortness of breath    3. Leg swelling                                 Shurti Padilla MD  09/06/18 1422       Shruti Padilla MD  09/20/18 2073

## 2018-09-06 NOTE — ED NOTES
RN tried to give report to 3rd floor , RN not ready to receive report, RN said she would call back when ready

## 2018-09-07 VITALS
HEART RATE: 77 BPM | HEIGHT: 69 IN | BODY MASS INDEX: 27.3 KG/M2 | DIASTOLIC BLOOD PRESSURE: 82 MMHG | SYSTOLIC BLOOD PRESSURE: 144 MMHG | RESPIRATION RATE: 18 BRPM | TEMPERATURE: 98 F | WEIGHT: 184.31 LBS | OXYGEN SATURATION: 100 %

## 2018-09-07 LAB
ALBUMIN SERPL BCP-MCNC: 2.8 G/DL
ALP SERPL-CCNC: 94 U/L
ALT SERPL W/O P-5'-P-CCNC: 11 U/L
ANION GAP SERPL CALC-SCNC: 13 MMOL/L
AST SERPL-CCNC: 21 U/L
BASOPHILS # BLD AUTO: 0.06 K/UL
BASOPHILS NFR BLD: 1.2 %
BILIRUB SERPL-MCNC: 2.5 MG/DL
BUN SERPL-MCNC: 47 MG/DL
CALCIUM SERPL-MCNC: 8.9 MG/DL
CHLORIDE SERPL-SCNC: 104 MMOL/L
CO2 SERPL-SCNC: 28 MMOL/L
CREAT SERPL-MCNC: 1.9 MG/DL
DIFFERENTIAL METHOD: ABNORMAL
EOSINOPHIL # BLD AUTO: 0.2 K/UL
EOSINOPHIL NFR BLD: 4.2 %
ERYTHROCYTE [DISTWIDTH] IN BLOOD BY AUTOMATED COUNT: 20.9 %
EST. GFR  (AFRICAN AMERICAN): 42 ML/MIN/1.73 M^2
EST. GFR  (NON AFRICAN AMERICAN): 36 ML/MIN/1.73 M^2
GLUCOSE SERPL-MCNC: 86 MG/DL
HCT VFR BLD AUTO: 24.2 %
HGB BLD-MCNC: 7.3 G/DL
LYMPHOCYTES # BLD AUTO: 1.1 K/UL
LYMPHOCYTES NFR BLD: 22.8 %
MCH RBC QN AUTO: 32 PG
MCHC RBC AUTO-ENTMCNC: 30.2 G/DL
MCV RBC AUTO: 106 FL
MONOCYTES # BLD AUTO: 0.4 K/UL
MONOCYTES NFR BLD: 8.1 %
NEUTROPHILS # BLD AUTO: 3.2 K/UL
NEUTROPHILS NFR BLD: 63.5 %
PLATELET # BLD AUTO: 130 K/UL
PMV BLD AUTO: 11.2 FL
POTASSIUM SERPL-SCNC: 3.3 MMOL/L
PROT SERPL-MCNC: 6 G/DL
RBC # BLD AUTO: 2.28 M/UL
SODIUM SERPL-SCNC: 145 MMOL/L
WBC # BLD AUTO: 4.96 K/UL

## 2018-09-07 PROCEDURE — 99239 HOSP IP/OBS DSCHRG MGMT >30: CPT | Mod: ,,, | Performed by: INTERNAL MEDICINE

## 2018-09-07 PROCEDURE — 94761 N-INVAS EAR/PLS OXIMETRY MLT: CPT

## 2018-09-07 PROCEDURE — 63600175 PHARM REV CODE 636 W HCPCS: Performed by: INTERNAL MEDICINE

## 2018-09-07 PROCEDURE — 36415 COLL VENOUS BLD VENIPUNCTURE: CPT

## 2018-09-07 PROCEDURE — 25000003 PHARM REV CODE 250: Performed by: INTERNAL MEDICINE

## 2018-09-07 PROCEDURE — 85025 COMPLETE CBC W/AUTO DIFF WBC: CPT

## 2018-09-07 PROCEDURE — 99223 1ST HOSP IP/OBS HIGH 75: CPT | Mod: ,,, | Performed by: INTERNAL MEDICINE

## 2018-09-07 PROCEDURE — 80053 COMPREHEN METABOLIC PANEL: CPT

## 2018-09-07 RX ORDER — FERROUS SULFATE 325(65) MG
325 TABLET ORAL 2 TIMES DAILY
COMMUNITY

## 2018-09-07 RX ORDER — POTASSIUM CHLORIDE 750 MG/1
10 TABLET, EXTENDED RELEASE ORAL ONCE
Status: DISCONTINUED | OUTPATIENT
Start: 2018-09-07 | End: 2018-09-07

## 2018-09-07 RX ORDER — POTASSIUM CHLORIDE 20 MEQ/1
20 TABLET, EXTENDED RELEASE ORAL ONCE
Status: COMPLETED | OUTPATIENT
Start: 2018-09-07 | End: 2018-09-07

## 2018-09-07 RX ADMIN — LEVOTHYROXINE SODIUM 75 MCG: 75 TABLET ORAL at 06:09

## 2018-09-07 RX ADMIN — POTASSIUM CHLORIDE 20 MEQ: 1500 TABLET, EXTENDED RELEASE ORAL at 03:09

## 2018-09-07 RX ADMIN — FUROSEMIDE 40 MG: 10 INJECTION, SOLUTION INTRAVENOUS at 09:09

## 2018-09-07 RX ADMIN — METOPROLOL SUCCINATE 200 MG: 50 TABLET, EXTENDED RELEASE ORAL at 09:09

## 2018-09-07 NOTE — CONSULTS
[facility]  Hematology/Oncology  Consult Note    Patient Name: Ronnie Sandoval  MRN: [id number]  Admission Date: 9/6/2018  Hospital Length of Stay: 1 days  Code Status: Full Code   Attending Provider: Mara Cox MD  Consulting Provider: Luis Chandler MD  Primary Care Physician: Daniel Hoover MD  Principal Problem:Anemia of chronic disease    Inpatient consult to Hematology/Oncology  Consult performed by: Luis Chandler MD  Consult ordered by: Mara Cox MD  Reason for consult: metastatic neuroendocrine tumor  Assessment/Recommendations: See consult note        Subjective:     HPI:  Mr. Sandoval is a 62 yo man who was admitted for anemia, chronic leg swelling and ascites. CT scan showed enlarged heterogeneous liver with innumerable masses suggesting metastatic disease. Moderate amount of intra-abdominal ascites with diffuse body wall anasarca. Large destructive soft tissue mass centered in the left pubis and extending to involve the right pubis. He has a history of widely metastatic neuroendocrine tumor of the rectum, Ki 67 ws 10%. He was treated with Cisplatin/etoposide x 2 cycles 12/14-1/15 but had progression of disease. He then received cape/tem initiated 5/13/15 - Completed 1 cycle (self discontinued due to adverse reactions). He has been off treatment over the past 3 years. Last saw Dr. Hernandez in Oct 2017. Was not interested in any more treatment at that time.     Oncology Treatment Plan:   [No treatment plan]    Medications:  Continuous Infusions:  Scheduled Meds:   furosemide  40 mg Intravenous BID    levothyroxine  75 mcg Oral Before breakfast    metoprolol succinate  200 mg Oral Daily     PRN Meds:sodium chloride, traMADol     Review of patient's allergies indicates:  No Known Allergies     Past Medical History:   Diagnosis Date    Acute deep vein thrombosis (DVT) of femoral vein of left lower extremity 10/2/2017    Anemia     Anemia in neoplastic disease 10/2/2017     Chemotherapy follow-up examination 5/26/2015    Colon polyps     Encounter for blood transfusion     Hx antineoplastic chemotherapy 12/2014-1/2015     16 + Cisplatin    Hyperlipidemia     Hypertension     Increased bilirubin level 10/2/2017    Malignant carcinoid tumor of rectum 5/26/2015    Malignant carcinoid tumor of the rectum     Neuroendocrine carcinoma, unknown primary site 4/14/2015    Papillary thyroid carcinoma 4/14/2015    Secondary malignant neoplasm of bone 4/14/2015    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    left groin mass; Ki 67-20%    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    Secondary neuroendocrine tumor of liver(209.72) 11/2014    Stage 3 chronic kidney disease 10/2/2017    Thrombocytopenia 10/2/2017    Urinary tract infection      Past Surgical History:   Procedure Laterality Date    COLONOSCOPY      excision of mass      left mastoid    EXCISION-MASS Left 8/5/2013    Performed by [facility] at [facility] OR    INSERTION-FILTER-INFERIOR VENA CAVA N/A 10/23/2017    Performed by [facility] OR 2ND FLR    LIVER BIOPSY  11/14    Mass in grion excised  8/2013    Carcinoid    Mass in groin excised  6/2013    Carcinoid    THYROIDECTOMY  2014    [place] - Carcinoid    TRANSPROSTATIC TISSUE RETRACTION N/A 9/5/2017    Performed by Rafael Holland MD at [facility] OR     Family History     Problem Relation (Age of Onset)    Cancer Father, Brother    Leukemia Father    Stroke Sister        Tobacco Use    Smoking status: Never Smoker    Smokeless tobacco: Never Used    Tobacco comment: Quit in 1979   Substance and Sexual Activity    Alcohol use: No    Drug use: No    Sexual activity: Not on file       Review of Systems   Constitutional: Positive for fatigue. Negative for activity change, appetite change, chills, fever and unexpected weight change.   HENT: Negative for mouth sores and nosebleeds.    Respiratory:  Negative for cough, chest tightness, shortness of breath and wheezing.    Cardiovascular: Positive for leg swelling. Negative for chest pain and palpitations.   Gastrointestinal: Positive for abdominal distention. Negative for abdominal pain, constipation, diarrhea, nausea and vomiting.   Genitourinary: Negative for flank pain and hematuria.   Musculoskeletal: Negative for back pain, joint swelling, myalgias, neck pain and neck stiffness.   Skin: Positive for pallor. Negative for color change, rash and wound.   Neurological: Negative for tremors, seizures, syncope, facial asymmetry, speech difficulty, weakness, light-headedness, numbness and headaches.   Hematological: Does not bruise/bleed easily.   Psychiatric/Behavioral: Negative for confusion and dysphoric mood. The patient is not nervous/anxious.    All other systems reviewed and are negative.    Objective:     Vital Signs (Most Recent):  Temp: 98.2 °F (36.8 °C) (09/07/18 1713)  Pulse: 77 (09/07/18 1800)  Resp: 18 (09/07/18 1713)  BP: (!) 144/82 (09/07/18 1713)  SpO2: 100 % (09/07/18 1713) Vital Signs (24h Range):  Temp:  [97.7 °F (36.5 °C)-98.8 °F (37.1 °C)] 98.2 °F (36.8 °C)  Pulse:  [69-88] 77  Resp:  [16-20] 18  SpO2:  [95 %-100 %] 100 %  BP: (125-160)/(67-82) 144/82     Weight: 83.6 kg (184 lb 4.9 oz)  Body mass index is 27.22 kg/m².  Body surface area is 2.02 meters squared.      Intake/Output Summary (Last 24 hours) at 9/7/2018 1820  Last data filed at 9/7/2018 0650  Gross per 24 hour   Intake 331 ml   Output 1450 ml   Net -1119 ml       Physical Exam   Constitutional: He is oriented to person, place, and time. No distress.   +bilateral temporal wasting, thin   HENT:   Head: Normocephalic and atraumatic.   Mouth/Throat: No oropharyngeal exudate.   Eyes: EOM are normal. Pupils are equal, round, and reactive to light. No scleral icterus.   Neck: Normal range of motion. Neck supple.   Cardiovascular: Normal rate, regular rhythm and normal heart sounds.    Pulmonary/Chest: Effort normal and breath sounds normal.   Abdominal: He exhibits distension. There is no tenderness.   Diffusely firm   Musculoskeletal: Normal range of motion. He exhibits edema (b/l LE 3+ edema).   Neurological: He is alert and oriented to person, place, and time. No cranial nerve deficit. Coordination normal.   Skin: Skin is warm and dry. There is pallor.   Psychiatric: He has a normal mood and affect. His behavior is normal. Judgment and thought content normal.       Significant Labs:   BMP:   Recent Labs   Lab  09/06/18   1034  09/07/18   0453   GLU  88  86   NA  145  145   K  4.4  3.3*   CL  104  104   CO2  29  28   BUN  46*  47*   CREATININE  2.1*  1.9*   CALCIUM  9.4  8.9    and CBC:   Recent Labs   Lab  09/06/18   1034  09/07/18   0453   WBC  5.71  4.96   HGB  6.6*  7.3*   HCT  23.0*  24.2*   PLT  167  130*       Diagnostic Results:  I have reviewed all pertinent imaging results/findings within the past 24 hours.    Assessment/Plan:     * Anemia of chronic disease    Transfuse for Hb<7        Primary malignant neuroendocrine tumor of rectum    - Discussed with Mr. Sandoval re treatment options. Everolimus can still be tried. However, it does come with side effects such as mouth sores, decreased appetite, weight loss, leg pain, leg swelling, fatigue, increased BS, elevated liver enzymes. Alternatively, I recommend hospice if he is not interested in any treatment.   - After long discussions, Mr. Sandoval would like to go on hospice.   - I gave them my card and asked them to call my office should he change his mind and want to try anti-cancer treatment. Mr and Mrs Sandoval understand and agree with the plan.             Thank you for your consult. I will follow-up with patient. Please contact us if you have any additional questions.    Luis Chandler MD  Hematology/Oncology  [facility]

## 2018-09-07 NOTE — PLAN OF CARE
SW met with pt at bedside to complete discharge assessment, verified PCP and uses [facility] on [address].  Pt's wife present and will provide transportation home.  No needs identified at this time.     09/07/18 1243   Discharge Assessment   Assessment Type Discharge Planning Assessment   Confirmed/corrected address and phone number on facesheet? Yes   Assessment information obtained from? Patient   Communicated expected length of stay with patient/caregiver no   Prior to hospitilization cognitive status: Alert/Oriented   Prior to hospitalization functional status: Independent   Current cognitive status: Alert/Oriented   Current Functional Status: Independent   Lives With spouse   Able to Return to Prior Arrangements yes   Is patient able to care for self after discharge? Unable to determine at this time (comments)   Who are your caregiver(s) and their phone number(s)? (Selene, spouse)   Patient's perception of discharge disposition home or selfcare   Readmission Within The Last 30 Days no previous admission in last 30 days   Patient currently being followed by outpatient case management? No   Patient currently receives any other outside agency services? No   Equipment Currently Used at Home none   Do you have any problems affording any of your prescribed medications? No   Is the patient taking medications as prescribed? yes   Does the patient have transportation home? Yes   Transportation Available family or friend will provide   Does the patient receive services at the [facility]? No   Discharge Plan A Home   Patient/Family In Agreement With Plan yes

## 2018-09-07 NOTE — ASSESSMENT & PLAN NOTE
Has chronic anemia  Now with weakness  Transfuse 2 units blood and monitor counts  Denies active bleeding

## 2018-09-07 NOTE — HPI
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 and secondary malignant neoplasm of bone, ckd, chronic leg swelling was sent by home nurse for worsening abdominal swelling and anemia.Patient c/o worsening abdominal swelling gradually over the last few months.He denies abdominal pain, chest pain, shortness of breath.Per patient and wife , he is able to do his activities of daily living, except for the last week when he has been getting weak.He denies any blood in stools.Patient has h/o chronic anemia and his last  H/h in 03/18 was 6.1 and 21 which was similar to today at 6.6.Patient was tried on chemo but he had side effects and did not want further treatment for his cancer, has not seen hem oncologist since last year.He did have left inguinal lymph node excision and per patient rectal mass excision when he had thyroid surgery for thyroid cancer.He does have chronic leg swelling and is on lasix, was tried on metalozone by cardiology but was making him dehydrated , so discontinued.His echo in 03/18 showed ef of 40-45%, grade 1 diastolic dysfunction, pa pressure 25.His last abdominal us in 10/17 did not show ascites.He also has history of left lower leg dvt for which was on lovenox at one point which was discontinued due to anemia and black stools.IVC filter could not be placed as Inferior venacavogram demonstrated chronic appearing thrombosis in the infrarenal IVC with collateral venous formation; the suprarenal IVC demonstrates narrowing.No suitable region for IVC filter deployment    Ct abdomen today showed Enlarged heterogeneous liver with innumerable masses suggesting metastatic disease.Moderate amount of intra-abdominal ascites with diffuse body wall anasarca.Large destructive soft tissue mass centered in the left pubis and extending to involve the right pubis.

## 2018-09-07 NOTE — HPI
Mr. Sandoval is a 62 yo man who was admitted for anemia, chronic leg swelling and ascites. CT scan showed enlarged heterogeneous liver with innumerable masses suggesting metastatic disease. Moderate amount of intra-abdominal ascites with diffuse body wall anasarca. Large destructive soft tissue mass centered in the left pubis and extending to involve the right pubis. He has a history of widely metastatic neuroendocrine tumor of the rectum, Ki 67 ws 10%. He was treated with Cisplatin/etoposide x 2 cycles 12/14-1/15 but had progression of disease. He then received cape/tem initiated 5/13/15 - Completed 1 cycle (self discontinued due to adverse reactions). He has been off treatment over the past 3 years. Last saw Dr. Hernandez in Oct 2017. Was not interested in any more treatment at that time.

## 2018-09-07 NOTE — SUBJECTIVE & OBJECTIVE
Past Medical History:   Diagnosis Date    Acute deep vein thrombosis (DVT) of femoral vein of left lower extremity 10/2/2017    Anemia     Anemia in neoplastic disease 10/2/2017    Chemotherapy follow-up examination 5/26/2015    Colon polyps     Encounter for blood transfusion     Hx antineoplastic chemotherapy 12/2014-1/2015     16 + Cisplatin    Hyperlipidemia     Hypertension     Increased bilirubin level 10/2/2017    Malignant carcinoid tumor of rectum 5/26/2015    Malignant carcinoid tumor of the rectum     Neuroendocrine carcinoma, unknown primary site 4/14/2015    Papillary thyroid carcinoma 4/14/2015    Secondary malignant neoplasm of bone 4/14/2015    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    left groin mass; Ki 67-20%    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    Secondary neuroendocrine tumor of liver(209.72) 11/2014    Stage 3 chronic kidney disease 10/2/2017    Thrombocytopenia 10/2/2017    Urinary tract infection        Past Surgical History:   Procedure Laterality Date    COLONOSCOPY      excision of mass      left mastoid    LIVER BIOPSY  11/14    Mass in grion excised  8/2013    Carcinoid    Mass in groin excised  6/2013    Carcinoid    THYROIDECTOMY  2014    [place] - Carcinoid       Review of patient's allergies indicates:  No Known Allergies    No current facility-administered medications on file prior to encounter.      Current Outpatient Medications on File Prior to Encounter   Medication Sig    furosemide (LASIX) 80 MG tablet Take 1 tablet (80 mg total) by mouth 2 (two) times daily.    KLOR-CON M20 20 mEq tablet TAKE ONE TABLET BY MOUTH TWICE DAILY    levothyroxine (SYNTHROID) 75 MCG tablet Take 1 tablet (75 mcg total) by mouth before breakfast.    losartan (COZAAR) 100 MG tablet Take 1 tablet (100 mg total) by mouth once daily.    metoprolol succinate (TOPROL-XL) 200 MG 24 hr tablet Take 1 tablet (200 mg total) by mouth once daily.  "   needle, disp, 25 gauge (BD REGULAR BEVEL NEEDLES) 25 gauge x 5/8" Ndle Pt to inject BID    tramadol (ULTRAM) 50 mg tablet Take 1 tablet (50 mg total) by mouth every 8 (eight) hours as needed for Pain.    [DISCONTINUED] hydrocodone-acetaminophen 5-325mg (NORCO) 5-325 mg per tablet Take 1 tablet by mouth every 6 (six) hours as needed for Pain.    [DISCONTINUED] metOLazone (ZAROXOLYN) 2.5 MG tablet Take 1 tablet (2.5 mg total) by mouth daily as needed. Take 30 minutes prior to lasix x 3 days, then only as needed for weight gain/edema     Family History     Problem Relation (Age of Onset)    Cancer Father, Brother    Leukemia Father    Stroke Sister        Tobacco Use    Smoking status: Never Smoker    Smokeless tobacco: Never Used    Tobacco comment: Quit in 1979   Substance and Sexual Activity    Alcohol use: No    Drug use: No    Sexual activity: Not on file     Review of Systems   Constitutional: Positive for fatigue. Negative for chills and fever.   HENT: Negative for trouble swallowing.    Respiratory: Negative for cough and shortness of breath.    Cardiovascular: Positive for leg swelling. Negative for chest pain.   Gastrointestinal: Positive for abdominal distention. Negative for abdominal pain, blood in stool, diarrhea and vomiting.   Genitourinary: Negative for dysuria and hematuria.   Musculoskeletal: Positive for gait problem.   Skin: Negative for rash.   Neurological: Positive for weakness. Negative for syncope and headaches.   Psychiatric/Behavioral: Negative for confusion.     Objective:     Vital Signs (Most Recent):  Temp: 98.7 °F (37.1 °C) (09/06/18 1917)  Pulse: 79 (09/06/18 2000)  Resp: 16 (09/06/18 1955)  BP: 138/76 (09/06/18 1917)  SpO2: 95 % (09/06/18 1955) Vital Signs (24h Range):  Temp:  [98.4 °F (36.9 °C)-99.4 °F (37.4 °C)] 98.7 °F (37.1 °C)  Pulse:  [74-94] 79  Resp:  [16-21] 16  SpO2:  [95 %-100 %] 95 %  BP: (130-152)/(67-82) 138/76     Weight: 85.1 kg (187 lb 9.8 oz)  Body mass " index is 27.71 kg/m².    Physical Exam   Constitutional: He is oriented to person, place, and time. No distress.   Thin male, bitemporal wasting    HENT:   Head: Normocephalic and atraumatic.   Eyes: EOM are normal. Pupils are equal, round, and reactive to light.   Neck: Normal range of motion. Neck supple.   Cardiovascular: Normal rate and regular rhythm.   Pulmonary/Chest: Effort normal and breath sounds normal. No respiratory distress.   Abdominal: Bowel sounds are normal.   Distended diffusely, firm to palpate, ? Hepatomegaly, no tenderness, ? Left inguinal lymphadenopathy   Musculoskeletal: Normal range of motion. He exhibits no edema.   3 plus edema bilateral lower extremities, thin upper extremities   Neurological: He is alert and oriented to person, place, and time. No cranial nerve deficit.   Skin: Skin is warm.   Psychiatric: He has a normal mood and affect.         CRANIAL NERVES     CN III, IV, VI   Pupils are equal, round, and reactive to light.  Extraocular motions are normal.        Significant Labs:   CBC:   Recent Labs   Lab  09/06/18   1034   WBC  5.71   HGB  6.6*   HCT  23.0*   PLT  167     CMP:   Recent Labs   Lab  09/06/18   1034   NA  145   K  4.4   CL  104   CO2  29   GLU  88   BUN  46*   CREATININE  2.1*   CALCIUM  9.4   PROT  6.8   ALBUMIN  3.2*   BILITOT  2.5*   ALKPHOS  104   AST  26   ALT  12   ANIONGAP  12   EGFRNONAA  32*       Significant Imaging: CT Abdomen Pelvis  Without Contrast  Narrative: EXAMINATION:  CT ABDOMEN PELVIS WITHOUT CONTRAST    CLINICAL HISTORY:  abdominal pain;  history of neuroendocrine tumor with left groin mass.    TECHNIQUE:  Low dose axial images, sagittal and coronal reformations were obtained from the lung bases to the pubic symphysis.  Oral contrast was not administered.    COMPARISON:  Complete abdominal ultrasound 10/04/2017    FINDINGS:  Heart: Normal size. No effusion.    Lung Bases: Subsegmental atelectasis is present in the right lung base.    Liver:  Markedly enlarged and heterogeneous with numerous hypodense masses throughout the liver parenchyma.    Gallbladder: Not definitively visualized.    Bile Ducts: No definite ductal dilatation.    Pancreas: Pancreatic body and head are poorly delineated, with suggestion of masses in this lesion, though at least some of these may be originating from the liver.    Spleen: Normal.    Adrenals: Normal.    Kidneys/Ureters: No mass, hydroureteronephrosis, or nephroureterolithiasis.    Bladder: No wall thickening.    Reproductive organs: Normal.    GI Tract/Mesentery: No evidence of bowel obstruction or inflammation. Appendix is normal.    Peritoneal Space: Moderate amount of free fluid throughout the abdomen and pelvis.    Retroperitoneum: No definite adenopathy, though close opposition of soft tissue structures and absence of intravenous contrast degrades evaluation.    Abdominal wall: Mild diffuse body wall anasarca.    Vasculature: No aneurysm.    Bones: Soft tissue mass centered within the left pubis extends to involve the right pubis and overall measures approximately 11.9 x 8.4 x 9.0 cm.  Impression: Enlarged heterogeneous liver with innumerable masses suggesting metastatic disease.    Moderate amount of intra-abdominal ascites with diffuse body wall anasarca.    Large destructive soft tissue mass centered in the left pubis and extending to involve the right pubis.    Electronically signed by: Quinten Diego MD  Date:    09/06/2018  Time:    13:12  X-Ray Chest 1 View  Narrative: EXAMINATION:  XR CHEST 1 VIEW    CLINICAL HISTORY:  Shortness of breath    TECHNIQUE:  Single frontal portable view of the chest was performed.    COMPARISON:  Chest radiograph 07/29/2017    FINDINGS:  Support devices: None    There is mild elevation of the right hemidiaphragm with atelectasis at the right lung base.  Lungs are otherwise clear.  No pleural effusion or pneumothorax.    The cardiac silhouette is normal in size. The hilar and  mediastinal contours are unremarkable.    No acute fracture.  Remote right 7th rib fracture is present.  DJD is present in the shoulders and spine.  Impression: Mild elevation of the right hemidiaphragm with atelectasis at the right lung base.    Electronically signed by: Quinten Diego MD  Date:    09/06/2018  Time:    11:32

## 2018-09-07 NOTE — ASSESSMENT & PLAN NOTE
- Discussed with Mr. Sandoval re treatment options. Everolimus can still be tried. However, it does come with side effects such as mouth sores, decreased appetite, weight loss, leg pain, leg swelling, fatigue, increased BS, elevated liver enzymes. Alternatively, I recommend hospice if he is not interested in any treatment.   - After long discussions, Mr. Sandoval would like to go on hospice.   - I gave them my card and asked them to call my office should he change his mind and want to try anti-cancer treatment. Mr and Mrs Sandoval understand and agree with the plan.

## 2018-09-07 NOTE — ASSESSMENT & PLAN NOTE
With mets to liver  Has received cisplatin/etoposide and captem which he  discontinued due to side effects  Has not followed with hem onc since 10/17  Ct scan shows worsening ascites and pelvic soft tissue mass which appears to be new  Patient and wife want to know if there are any treatment options  Will get hem oncology recommendations in am

## 2018-09-07 NOTE — PLAN OF CARE
Plan of care reviewed with patient and spouse at bedside, medications explained. Pt currently resting comfortably in bed and appears to be sleeping in between care. Pt completed 2 untis PRBCs and tolerated well with no S/S of a transfusion reaction. Reevaluation of CBC with morning labs explained, pt verbalized understanding. VSS, bed in lowest, locked position and call light in reach. Hourly rounding performed, will continue to monitor.

## 2018-09-07 NOTE — H&P
"[facility] Medicine  History & Physical    Patient Name: Ronnie Sandoval  MRN: [id number]  Admission Date: 9/6/2018  Attending Physician: Mara Cox MD   Primary Care Provider: Daniel Hoover MD         Patient information was obtained from patient, spouse/SO, past medical records and ER records.     Subjective:     Principal Problem:Anemia of chronic disease    Chief Complaint:   Chief Complaint   Patient presents with    Low H/H     Pt states "the home health nurse told me to come here because my blood count is low." Pt c/o weakness.    Leg Swelling     Pt c/o increased lower extremity swelling, states "They're more swollen than they usually are."    Abdominal Swelling     Pt also c/o increased abdominal swelling X 1 month, PMH liver cancer.        HPI: 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 and secondary malignant neoplasm of bone, ckd, chronic leg swelling was sent by home nurse for worsening abdominal swelling and anemia.Patient c/o worsening abdominal swelling gradually over the last few months.He denies abdominal pain, chest pain, shortness of breath.Per patient and wife , he is able to do his activities of daily living, except for the last week when he has been getting weak.He denies any blood in stools.Patient has h/o chronic anemia and his last  H/h in 03/18 was 6.1 and 21 which was similar to today at 6.6.Patient was tried on chemo but he had side effects and did not want further treatment for his cancer, has not seen hem oncologist since last year.He did have left inguinal lymph node excision and per patient rectal mass excision when he had thyroid surgery for thyroid cancer.He does have chronic leg swelling and is on lasix, was tried on metalozone by cardiology but was making him dehydrated , so discontinued.His echo in 03/18 showed ef of 40-45%, grade 1 diastolic dysfunction, pa pressure 25.His last " abdominal us in 10/17 did not show ascites.He also has history of left lower leg dvt for which was on lovenox at one point which was discontinued due to anemia and black stools.IVC filter could not be placed as Inferior venacavogram demonstrated chronic appearing thrombosis in the infrarenal IVC with collateral venous formation; the suprarenal IVC demonstrates narrowing.No suitable region for IVC filter deployment    Ct abdomen today showed Enlarged heterogeneous liver with innumerable masses suggesting metastatic disease.Moderate amount of intra-abdominal ascites with diffuse body wall anasarca.Large destructive soft tissue mass centered in the left pubis and extending to involve the right pubis.    Past Medical History:   Diagnosis Date    Acute deep vein thrombosis (DVT) of femoral vein of left lower extremity 10/2/2017    Anemia     Anemia in neoplastic disease 10/2/2017    Chemotherapy follow-up examination 5/26/2015    Colon polyps     Encounter for blood transfusion     Hx antineoplastic chemotherapy 12/2014-1/2015     16 + Cisplatin    Hyperlipidemia     Hypertension     Increased bilirubin level 10/2/2017    Malignant carcinoid tumor of rectum 5/26/2015    Malignant carcinoid tumor of the rectum     Neuroendocrine carcinoma, unknown primary site 4/14/2015    Papillary thyroid carcinoma 4/14/2015    Secondary malignant neoplasm of bone 4/14/2015    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    left groin mass; Ki 67-20%    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    Secondary neuroendocrine tumor of liver(209.72) 11/2014    Stage 3 chronic kidney disease 10/2/2017    Thrombocytopenia 10/2/2017    Urinary tract infection        Past Surgical History:   Procedure Laterality Date    COLONOSCOPY      excision of mass      left mastoid    LIVER BIOPSY  11/14    Mass in grion excised  8/2013    Carcinoid    Mass in groin excised  6/2013    Carcinoid     "THYROIDECTOMY  2014    [facility] - Carcinoid       Review of patient's allergies indicates:  No Known Allergies    No current facility-administered medications on file prior to encounter.      Current Outpatient Medications on File Prior to Encounter   Medication Sig    furosemide (LASIX) 80 MG tablet Take 1 tablet (80 mg total) by mouth 2 (two) times daily.    KLOR-CON M20 20 mEq tablet TAKE ONE TABLET BY MOUTH TWICE DAILY    levothyroxine (SYNTHROID) 75 MCG tablet Take 1 tablet (75 mcg total) by mouth before breakfast.    losartan (COZAAR) 100 MG tablet Take 1 tablet (100 mg total) by mouth once daily.    metoprolol succinate (TOPROL-XL) 200 MG 24 hr tablet Take 1 tablet (200 mg total) by mouth once daily.    needle, disp, 25 gauge (BD REGULAR BEVEL NEEDLES) 25 gauge x 5/8" Ndle Pt to inject BID    tramadol (ULTRAM) 50 mg tablet Take 1 tablet (50 mg total) by mouth every 8 (eight) hours as needed for Pain.    [DISCONTINUED] hydrocodone-acetaminophen 5-325mg (NORCO) 5-325 mg per tablet Take 1 tablet by mouth every 6 (six) hours as needed for Pain.    [DISCONTINUED] metOLazone (ZAROXOLYN) 2.5 MG tablet Take 1 tablet (2.5 mg total) by mouth daily as needed. Take 30 minutes prior to lasix x 3 days, then only as needed for weight gain/edema     Family History     Problem Relation (Age of Onset)    Cancer Father, Brother    Leukemia Father    Stroke Sister        Tobacco Use    Smoking status: Never Smoker    Smokeless tobacco: Never Used    Tobacco comment: Quit in 1979   Substance and Sexual Activity    Alcohol use: No    Drug use: No    Sexual activity: Not on file     Review of Systems   Constitutional: Positive for fatigue. Negative for chills and fever.   HENT: Negative for trouble swallowing.    Respiratory: Negative for cough and shortness of breath.    Cardiovascular: Positive for leg swelling. Negative for chest pain.   Gastrointestinal: Positive for abdominal distention. Negative for abdominal " pain, blood in stool, diarrhea and vomiting.   Genitourinary: Negative for dysuria and hematuria.   Musculoskeletal: Positive for gait problem.   Skin: Negative for rash.   Neurological: Positive for weakness. Negative for syncope and headaches.   Psychiatric/Behavioral: Negative for confusion.     Objective:     Vital Signs (Most Recent):  Temp: 98.7 °F (37.1 °C) (09/06/18 1917)  Pulse: 79 (09/06/18 2000)  Resp: 16 (09/06/18 1955)  BP: 138/76 (09/06/18 1917)  SpO2: 95 % (09/06/18 1955) Vital Signs (24h Range):  Temp:  [98.4 °F (36.9 °C)-99.4 °F (37.4 °C)] 98.7 °F (37.1 °C)  Pulse:  [74-94] 79  Resp:  [16-21] 16  SpO2:  [95 %-100 %] 95 %  BP: (130-152)/(67-82) 138/76     Weight: 85.1 kg (187 lb 9.8 oz)  Body mass index is 27.71 kg/m².    Physical Exam   Constitutional: He is oriented to person, place, and time. No distress.   Thin male, bitemporal wasting    HENT:   Head: Normocephalic and atraumatic.   Eyes: EOM are normal. Pupils are equal, round, and reactive to light.   Neck: Normal range of motion. Neck supple.   Cardiovascular: Normal rate and regular rhythm.   Pulmonary/Chest: Effort normal and breath sounds normal. No respiratory distress.   Abdominal: Bowel sounds are normal.   Distended diffusely, firm to palpate, ? Hepatomegaly, no tenderness, ? Left inguinal lymphadenopathy   Musculoskeletal: Normal range of motion. He exhibits no edema.   3 plus edema bilateral lower extremities, thin upper extremities   Neurological: He is alert and oriented to person, place, and time. No cranial nerve deficit.   Skin: Skin is warm.   Psychiatric: He has a normal mood and affect.         CRANIAL NERVES     CN III, IV, VI   Pupils are equal, round, and reactive to light.  Extraocular motions are normal.        Significant Labs:   CBC:   Recent Labs   Lab  09/06/18   1034   WBC  5.71   HGB  6.6*   HCT  23.0*   PLT  167     CMP:   Recent Labs   Lab  09/06/18   1034   NA  145   K  4.4   CL  104   CO2  29   GLU  88   BUN   46*   CREATININE  2.1*   CALCIUM  9.4   PROT  6.8   ALBUMIN  3.2*   BILITOT  2.5*   ALKPHOS  104   AST  26   ALT  12   ANIONGAP  12   EGFRNONAA  32*       Significant Imaging: CT Abdomen Pelvis  Without Contrast  Narrative: EXAMINATION:  CT ABDOMEN PELVIS WITHOUT CONTRAST    CLINICAL HISTORY:  abdominal pain;  history of neuroendocrine tumor with left groin mass.    TECHNIQUE:  Low dose axial images, sagittal and coronal reformations were obtained from the lung bases to the pubic symphysis.  Oral contrast was not administered.    COMPARISON:  Complete abdominal ultrasound 10/04/2017    FINDINGS:  Heart: Normal size. No effusion.    Lung Bases: Subsegmental atelectasis is present in the right lung base.    Liver: Markedly enlarged and heterogeneous with numerous hypodense masses throughout the liver parenchyma.    Gallbladder: Not definitively visualized.    Bile Ducts: No definite ductal dilatation.    Pancreas: Pancreatic body and head are poorly delineated, with suggestion of masses in this lesion, though at least some of these may be originating from the liver.    Spleen: Normal.    Adrenals: Normal.    Kidneys/Ureters: No mass, hydroureteronephrosis, or nephroureterolithiasis.    Bladder: No wall thickening.    Reproductive organs: Normal.    GI Tract/Mesentery: No evidence of bowel obstruction or inflammation. Appendix is normal.    Peritoneal Space: Moderate amount of free fluid throughout the abdomen and pelvis.    Retroperitoneum: No definite adenopathy, though close opposition of soft tissue structures and absence of intravenous contrast degrades evaluation.    Abdominal wall: Mild diffuse body wall anasarca.    Vasculature: No aneurysm.    Bones: Soft tissue mass centered within the left pubis extends to involve the right pubis and overall measures approximately 11.9 x 8.4 x 9.0 cm.  Impression: Enlarged heterogeneous liver with innumerable masses suggesting metastatic disease.    Moderate amount of  intra-abdominal ascites with diffuse body wall anasarca.    Large destructive soft tissue mass centered in the left pubis and extending to involve the right pubis.    Electronically signed by: Quinten Diego MD  Date:    09/06/2018  Time:    13:12  X-Ray Chest 1 View  Narrative: EXAMINATION:  XR CHEST 1 VIEW    CLINICAL HISTORY:  Shortness of breath    TECHNIQUE:  Single frontal portable view of the chest was performed.    COMPARISON:  Chest radiograph 07/29/2017    FINDINGS:  Support devices: None    There is mild elevation of the right hemidiaphragm with atelectasis at the right lung base.  Lungs are otherwise clear.  No pleural effusion or pneumothorax.    The cardiac silhouette is normal in size. The hilar and mediastinal contours are unremarkable.    No acute fracture.  Remote right 7th rib fracture is present.  DJD is present in the shoulders and spine.  Impression: Mild elevation of the right hemidiaphragm with atelectasis at the right lung base.    Electronically signed by: Quinten Diego MD  Date:    09/06/2018  Time:    11:32        Assessment/Plan:     * Anemia of chronic disease    Has chronic anemia  Now with weakness  Transfuse 2 units blood and monitor counts  Denies active bleeding          Other ascites    Will get IR consult for paracentesis in am  Check cytology and cell diff          Primary malignant neuroendocrine tumor of rectum    With mets to liver  Has received cisplatin/etoposide and captem which he  discontinued due to side effects  Has not followed with hem onc since 10/17  Ct scan shows worsening ascites and pelvic soft tissue mass which appears to be new  Patient and wife want to know if there are any treatment options  Will get hem oncology recommendations in am          Chronic diastolic heart failure    Continue lasix 40 mg iv bid  bnp 1790 which is improved from 03/18 of 3880  No chest pain        Leg swelling    Chronic,probably multifactorial with tumor burden, ckd, diastolic heart  failure  - continue lasix 40 mg iv bid          Stage 3 chronic kidney disease    Stable, monitor with diuresis          Post-surgical hypothyroidism    Continue synthroid          Hypertension    Will continue metoprolol , hold cozaar and monitor blood pressure            VTE Risk Mitigation (From admission, onward)        Ordered     IP VTE HIGH RISK PATIENT  Once      09/06/18 1718     Place sequential compression device  Until discontinued      09/06/18 1718             Mara Cox MD  Department of Hospital Medicine   [facility]

## 2018-09-07 NOTE — PHARMACY MED REC
"Admission Medication Reconciliation - Pharmacy Consult Note    The home medication history was taken by Nelly Wallace CPhT.  Based on information gathered and subsequent review by the clinical pharmacist, the items below may need attention.    You may go to "Admission" then "Reconcile Home Medications" tabs to review and/or act upon these items.    No issues noted with the medication reconciliation.    Facility-Administered Medications Prior to Admission   Medication Dose Route Frequency Provider Last Rate Last Dose    0.9%  NaCl infusion (for blood administration)   Intravenous Once Adryan Hernandez DO, FACP        acetaminophen tablet 650 mg  650 mg Oral PRN Adryan Hernandez DO, FACP        diphenhydrAMINE capsule 25 mg  25 mg Oral PRN Adryan Hernandez DO, FACP        furosemide injection 20 mg  20 mg Intravenous PRN Adryan Hernandez DO, FACP        furosemide injection 20 mg  20 mg Intravenous PRN Adryan Hernandez DO, FACP        potassium iodide tablet 130 mg  130 mg Oral On Call Procedure Adryan Hernandez DO, FACP         Medications Prior to Admission   Medication Sig Dispense Refill Last Dose    ergocalciferol, vitamin D2, (VITAMIN D ORAL) Take 1 tablet by mouth once daily.       ferrous sulfate 325 mg (65 mg iron) Tab tablet Take 325 mg by mouth 2 (two) times daily.       furosemide (LASIX) 80 MG tablet Take 1 tablet (80 mg total) by mouth 2 (two) times daily. 180 tablet 1 9/5/2018    KLOR-CON M20 20 mEq tablet TAKE ONE TABLET BY MOUTH TWICE DAILY (Patient taking differently: TAKE TWO TABLET BY MOUTH ONCE DAILY) 180 tablet 2 9/5/2018    levothyroxine (SYNTHROID) 75 MCG tablet Take 1 tablet (75 mcg total) by mouth before breakfast. 90 tablet 3 9/5/2018 at Unknown time    losartan (COZAAR) 100 MG tablet Take 1 tablet (100 mg total) by mouth once daily. 90 tablet 2 9/5/2018 at Unknown time    metoprolol succinate (TOPROL-XL) 200 MG 24 hr tablet Take 1 tablet (200 mg total) by mouth " "once daily. 90 tablet 2 9/5/2018 at Unknown time    multivit-min/FA/lycopen/lutein (CENTRUM SILVER MEN ORAL) Take 1 tablet by mouth once daily.       tramadol (ULTRAM) 50 mg tablet Take 1 tablet (50 mg total) by mouth every 8 (eight) hours as needed for Pain. 90 tablet 0     needle, disp, 25 gauge (BD REGULAR BEVEL NEEDLES) 25 gauge x 5/8" Ndle Pt to inject  each 2 Taking       Please address this information as you see fit.  Feel free to contact us if you have any questions or require assistance.    Doug Manning, Pharm.D., BCPS  [phone]                .  .          "

## 2018-09-07 NOTE — CONSULTS
Consult/H&P Note  Interventional Radiology    Consult Requested By: medicine    Reason for Consult: abdominal distension    SUBJECTIVE:     Chief Complaint: anemia, swelling    History of Present Illness: 62 yo M with widely metastatic NET.  He has a small volume of ascites.    Past Medical History:   Diagnosis Date    Acute deep vein thrombosis (DVT) of femoral vein of left lower extremity 10/2/2017    Anemia     Anemia in neoplastic disease 10/2/2017    Chemotherapy follow-up examination 5/26/2015    Colon polyps     Encounter for blood transfusion     Hx antineoplastic chemotherapy 12/2014-1/2015     16 + Cisplatin    Hyperlipidemia     Hypertension     Increased bilirubin level 10/2/2017    Malignant carcinoid tumor of rectum 5/26/2015    Malignant carcinoid tumor of the rectum     Neuroendocrine carcinoma, unknown primary site 4/14/2015    Papillary thyroid carcinoma 4/14/2015    Secondary malignant neoplasm of bone 4/14/2015    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    left groin mass; Ki 67-20%    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    Secondary neuroendocrine tumor of liver(209.72) 11/2014    Stage 3 chronic kidney disease 10/2/2017    Thrombocytopenia 10/2/2017    Urinary tract infection      Past Surgical History:   Procedure Laterality Date    COLONOSCOPY      excision of mass      left mastoid    LIVER BIOPSY  11/14    Mass in grion excised  8/2013    Carcinoid    Mass in groin excised  6/2013    Carcinoid    THYROIDECTOMY  2014    [facility] - Carcinoid     Family History   Problem Relation Age of Onset    Leukemia Father     Cancer Father         leukemia    Stroke Sister     Cancer Brother         colon    Colon cancer Neg Hx     Esophageal cancer Neg Hx      Social History     Tobacco Use    Smoking status: Never Smoker    Smokeless tobacco: Never Used    Tobacco comment: Quit in 1979   Substance Use Topics    Alcohol use: No     Drug use: No       Review of Systems:  As per primary H&P      OBJECTIVE:     Vital Signs Range (Last 24H):  Temp:  [97.7 °F (36.5 °C)-99.4 °F (37.4 °C)]   Pulse:  [69-94]   Resp:  [16-21]   BP: (125-160)/(67-80)   SpO2:  [95 %-100 %]     Physical Exam:  General- Patient alert and oriented x3D  ENT- PERRLA,  Neck- No masses  CV- Regular rate and rhythm  Resp-  No increased WOB  GI- Non tender/+ distended  Extrem- + edema.   Derm- No rashes, masses, or lesions noted  Neuro-  No focal deficits noted.     Physical Exam  Body mass index is 27.22 kg/m².    Scheduled Meds:    furosemide  40 mg Intravenous BID    levothyroxine  75 mcg Oral Before breakfast    metoprolol succinate  200 mg Oral Daily     Continuous Infusions:   PRN Meds:sodium chloride, traMADol    Allergies: Review of patient's allergies indicates:  No Known Allergies    Labs:  Recent Labs   Lab  09/06/18   1034   INR  1.2       Recent Labs   Lab  09/07/18   0453   WBC  4.96   HGB  7.3*   HCT  24.2*   MCV  106*   PLT  130*      Recent Labs   Lab  09/07/18   0453   GLU  86   NA  145   K  3.3*   CL  104   CO2  28   BUN  47*   CREATININE  1.9*   CALCIUM  8.9   ALT  11   AST  21   ALBUMIN  2.8*   BILITOT  2.5*       Vitals (Most Recent):  Temp: 97.7 °F (36.5 °C) (09/07/18 1125)  Pulse: 82 (09/07/18 1125)  Resp: 20 (09/07/18 1125)  BP: (!) 146/79 (09/07/18 1125)  SpO2: 98 % (09/07/18 1125)      ASSESSMENT/PLAN:     There is only a small volume of abdominopelvic ascites.  The patient's distension is due to his significantly enlarged liver secondary to metastatic NET.  Paracentesis would not likely provide symptomatic improvement, so will defer paracentesis since there is not concrn for SBP.    Active Hospital Problems    Diagnosis  POA    *Anemia of chronic disease [D63.8]  Yes    Other ascites [R18.8]  Yes    Leg swelling [M79.89]  Yes    Chronic diastolic heart failure [I50.32]  Yes    Stage 3 chronic kidney disease [N18.3]  Yes    Primary malignant  neuroendocrine tumor of rectum [C7A.8]  Yes    Post-surgical hypothyroidism [E89.0]  Yes    Hypertension [I10]  Yes      Resolved Hospital Problems   No resolved problems to display.           Samm Long MD

## 2018-09-07 NOTE — ASSESSMENT & PLAN NOTE
Chronic,probably multifactorial with tumor burden, ckd, diastolic heart failure  - continue lasix 40 mg iv bid

## 2018-09-07 NOTE — SUBJECTIVE & OBJECTIVE
Oncology Treatment Plan:   [No treatment plan]    Medications:  Continuous Infusions:  Scheduled Meds:   furosemide  40 mg Intravenous BID    levothyroxine  75 mcg Oral Before breakfast    metoprolol succinate  200 mg Oral Daily     PRN Meds:sodium chloride, traMADol     Review of patient's allergies indicates:  No Known Allergies     Past Medical History:   Diagnosis Date    Acute deep vein thrombosis (DVT) of femoral vein of left lower extremity 10/2/2017    Anemia     Anemia in neoplastic disease 10/2/2017    Chemotherapy follow-up examination 5/26/2015    Colon polyps     Encounter for blood transfusion     Hx antineoplastic chemotherapy 12/2014-1/2015     16 + Cisplatin    Hyperlipidemia     Hypertension     Increased bilirubin level 10/2/2017    Malignant carcinoid tumor of rectum 5/26/2015    Malignant carcinoid tumor of the rectum     Neuroendocrine carcinoma, unknown primary site 4/14/2015    Papillary thyroid carcinoma 4/14/2015    Secondary malignant neoplasm of bone 4/14/2015    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    left groin mass; Ki 67-20%    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    Secondary neuroendocrine tumor of liver(209.72) 11/2014    Stage 3 chronic kidney disease 10/2/2017    Thrombocytopenia 10/2/2017    Urinary tract infection      Past Surgical History:   Procedure Laterality Date    COLONOSCOPY      excision of mass      left mastoid    EXCISION-MASS Left 8/5/2013    Performed by Otilio Ramirez MD at [facility] OR    INSERTION-FILTER-INFERIOR VENA CAVA N/A 10/23/2017    Performed by [facility] at [facility] 2ND F[facility]    LIVER BIOPSY  11/14    Mass in grion excised  8/2013    Carcinoid    Mass in groin excised  6/2013    Carcinoid    THYROIDECTOMY  2014    [place] - Carcinoid    TRANSPROSTATIC TISSUE RETRACTION N/A 9/5/2017    Performed by Rafael Holland MD at [facility] OR     Family History     Problem Relation  (Age of Onset)    Cancer Father, Brother    Leukemia Father    Stroke Sister        Tobacco Use    Smoking status: Never Smoker    Smokeless tobacco: Never Used    Tobacco comment: Quit in 1979   Substance and Sexual Activity    Alcohol use: No    Drug use: No    Sexual activity: Not on file       Review of Systems   Constitutional: Positive for fatigue. Negative for activity change, appetite change, chills, fever and unexpected weight change.   HENT: Negative for mouth sores and nosebleeds.    Respiratory: Negative for cough, chest tightness, shortness of breath and wheezing.    Cardiovascular: Positive for leg swelling. Negative for chest pain and palpitations.   Gastrointestinal: Positive for abdominal distention. Negative for abdominal pain, constipation, diarrhea, nausea and vomiting.   Genitourinary: Negative for flank pain and hematuria.   Musculoskeletal: Negative for back pain, joint swelling, myalgias, neck pain and neck stiffness.   Skin: Positive for pallor. Negative for color change, rash and wound.   Neurological: Negative for tremors, seizures, syncope, facial asymmetry, speech difficulty, weakness, light-headedness, numbness and headaches.   Hematological: Does not bruise/bleed easily.   Psychiatric/Behavioral: Negative for confusion and dysphoric mood. The patient is not nervous/anxious.    All other systems reviewed and are negative.    Objective:     Vital Signs (Most Recent):  Temp: 98.2 °F (36.8 °C) (09/07/18 1713)  Pulse: 77 (09/07/18 1800)  Resp: 18 (09/07/18 1713)  BP: (!) 144/82 (09/07/18 1713)  SpO2: 100 % (09/07/18 1713) Vital Signs (24h Range):  Temp:  [97.7 °F (36.5 °C)-98.8 °F (37.1 °C)] 98.2 °F (36.8 °C)  Pulse:  [69-88] 77  Resp:  [16-20] 18  SpO2:  [95 %-100 %] 100 %  BP: (125-160)/(67-82) 144/82     Weight: 83.6 kg (184 lb 4.9 oz)  Body mass index is 27.22 kg/m².  Body surface area is 2.02 meters squared.      Intake/Output Summary (Last 24 hours) at 9/7/2018 1820  Last data  filed at 9/7/2018 0650  Gross per 24 hour   Intake 331 ml   Output 1450 ml   Net -1119 ml       Physical Exam   Constitutional: He is oriented to person, place, and time. No distress.   +bilateral temporal wasting, thin   HENT:   Head: Normocephalic and atraumatic.   Mouth/Throat: No oropharyngeal exudate.   Eyes: EOM are normal. Pupils are equal, round, and reactive to light. No scleral icterus.   Neck: Normal range of motion. Neck supple.   Cardiovascular: Normal rate, regular rhythm and normal heart sounds.   Pulmonary/Chest: Effort normal and breath sounds normal.   Abdominal: He exhibits distension. There is no tenderness.   Diffusely firm   Musculoskeletal: Normal range of motion. He exhibits edema (b/l LE 3+ edema).   Neurological: He is alert and oriented to person, place, and time. No cranial nerve deficit. Coordination normal.   Skin: Skin is warm and dry. There is pallor.   Psychiatric: He has a normal mood and affect. His behavior is normal. Judgment and thought content normal.       Significant Labs:   BMP:   Recent Labs   Lab  09/06/18   1034  09/07/18   0453   GLU  88  86   NA  145  145   K  4.4  3.3*   CL  104  104   CO2  29  28   BUN  46*  47*   CREATININE  2.1*  1.9*   CALCIUM  9.4  8.9    and CBC:   Recent Labs   Lab  09/06/18   1034  09/07/18   0453   WBC  5.71  4.96   HGB  6.6*  7.3*   HCT  23.0*  24.2*   PLT  167  130*       Diagnostic Results:  I have reviewed all pertinent imaging results/findings within the past 24 hours.

## 2018-09-07 NOTE — DISCHARGE INSTRUCTIONS
Thank you for choosing [facility] as your Health Care Provider. [facility] strives to provide the best healthcare available to you. In the next few days you may receive a Survey, either by mail or email,  asking you to rate our care that was provided to you during your stay.  Please return the survey to us, as your feedback is important. We aim to meet your expectations of safe, quality health care.    From your [facility] Team.  Anemia During Cancer    When levels of healthy red blood cells (RBCs) in the body drop to levels that are below normal, the condition is called anemia. Anemia can occur during cancer and its treatment for many reasons. Read below to learn more about anemia during cancer and how its treated.  What is anemia?  RBCs are made in the bone marrow. Normal blood has about 35% to 50% RBCs. Anemic blood has less than 35% RBCs. RBCs carry oxygen around the body. If you have low levels of RBCs, not enough oxygen is delivered to your body. This may cause symptoms of dizziness, weakness, or tiredness. You may have trouble doing daily tasks. You may often feel cold. And you may be short of breath and have a rapid heartbeat. But some people with anemia have no symptoms.  Why anemia can occur with cancer  Anemia during cancer can have several causes:. These include:  · Treatments that destroy bone marrow, such as chemotherapy and radiation therapy  · Blood loss during surgery  · Low levels of certain B vitamins or iron  · Kidney disease leading to low levels of EPO (erythropoietin), a substance the body needs to make RBCs  · Wasting (nutritional problems and weight loss) from cancer that lower the bodys ability to make RBCs  Testing for anemia  A blood sample is taken from your arm and then tested. Several different types of tests may be done on this blood. Some count the numbers of blood cells. Others test for substances the body uses to make RBCs, such as vitamins and  iron.  Treating anemia during cancer  Your treatment will depend on the cause of your anemia. It will also depend on how severe your symptoms are. Your doctor can tell you more about treatment options and their risks and benefits for you. Treatments include the following:  · RBC transfusion. A tube (cannula) is put into a vein in the hand or arm. RBCs from a donor are sent through the tube into the body. This increases the number of healthy RBCs in the body. This can reverse anemia very quickly. RBC transfusions are safe. However, there are some risks. Your doctor will discuss them with you. Be sure you understand these risks.You may need to sign a consent form before receiving treatment.  · Erythropoiesis stimulating agent (EVA). This is medicine that causes the body to make more RBCs. An EVA is given as a shot. It may be given along with iron (see below). An EVA takes several weeks or months to reverse anemia. There are special risks with EVA treatment. Your doctor will discuss them with you. Be sure you understand these risks.You may need to sign a consent form before receiving treatment.  · Intravenous (IV) iron. A liquid medicine containing iron is given by shots or IV line. Several treatments may be given. IV iron is usually used if you are being given an EVA. IV iron usually takes 1 to 4 weeks to reverse anemia.  · Oral supplements. Iron or vitamin B supplements may be given. These come in liquid or pill form, to take by mouth or they may be by injection. Oral supplements can take weeks or months to reverse anemia.  Checking your progress  During the course of your treatment, youll likely have more blood tests. These are to check your blood levels and your response to the treatment.  Risks and complications  Each treatment has its own risks. Your healthcare provider will tell you what risks apply to you. These may include:  · Fever  · Hives or other allergic reactions  · Iron overload  · High or low blood  pressure  · Nausea  · Liver inflammation  · Blood clots   Date Last Reviewed: 12/27/2015  © 1524-2798 [employer]. [address] [place] [address], [place] [address]. All rights reserved. This information is not intended as a substitute for professional medical care. Always follow your healthcare professional's instructions.        Discharge Instructions for Cancer of the Liver  You have been diagnosed with liver cancer, the abnormal and uncontrolled growth of cells in the liver leading to the formation of a tumor or mass. Treatment for liver cancer may include surgery or systemic therapy or ablation/destruction of the tumor through a probe in the skin, or through a blood vessel. Or, you may have had some combination. Very few people get intravenous chemotherapy for hepatocellular carcinoma, or even cholangiocarcinoma. You discussed your treatment plan with your doctor in detail. This sheet helps you remember how to care for yourself after surgery or systemic or local therapy.  Home care after surgery or percutaneous ablative therapy  Heres what to do at home following surgery for liver cancer.  Activity  · Increase your activity gradually. Take short walks on a level surface.  · Dont overexert yourself to the point of fatigue. If you become tired, rest.  · Shower as needed. Ask a friend or family member to stand close by in case you need help.  · Limit stair climbing to once or twice a day. Climb slowly and stop to rest every few steps.  · Dont lift anything heavier than 5 pounds for 4-6 weeks after surgery.  · Dont mow the lawn or push a vacuum .  · If you ride in the car for more than short trips, stop frequently to stretch your legs. Dont drive until your doctor says its okay.  · Ask your doctor when you can expect to return to work.  Incision care  · Wash the incision site with soap and water and pat dry. Avoid scrubbing the incision.  · Inspect the incision site every day for increased  redness, drainage, swelling, or separation of the skin.  Other home care  · Take your medications exactly as directed.  · Dont take any over-the-counter supplements or herbal medications unless your doctor says its okay.  · Check your temperature every day for 7 days after your surgery.  · Return to your diet as tolerated. Eat a healthy well-balanced diet.  Home care after transarterial ablative therapy  Heres what to do at home following systemic therapyfor liver cancer.   Prevent mouth sores  Many people get mouth sores during chemotherapy. So, dont be discouraged if you do, even if you are following all your doctors instructions. Do the following to help prevent mouth sores or to ease discomfort.  · Brush your teeth with a soft-bristle toothbrush after every meal.  · Dont use dental floss if your platelet count is below 50,000. Your doctor or nurse will tell you if this is the case.  · Use an oral swab or special soft toothbrush if your gums bleed during regular brushing. Tell your doctor if bleeding doesnt stop.  · Use any mouthwashes given to you as directed.  · If you cant tolerate regular methods, use salt and baking soda to clean your mouth. Mix 1 teaspoon(s) of salt and 1 teaspoon(s) of baking soda into an 8-ounce glass of warm water. Swish and spit.  · Watch your mouth and tongue for white patches. This is a sign of fungal infection, a common side effect of chemotherapy. Be sure to tell your doctor about these patches. Medication can be prescribed to help you fight the fungal infection.  Manage other side effects  · Try to exercise. Exercise keeps you strong and keeps your heart and lungs active. Walk as much as you can without becoming dizzy or weak. Start slowly. Build your time and intensity level gradually.  · Dont be surprised if your treatment causes slight burns to your skin. Some drugs used in high doses can cause this to happen. Ask for a special cream to help relieve the burn and protect  your skin.  · Let your doctor know if your throat is sore. You may have an infection that needs treatment.  · Remember, many patients feel sick and lose their appetites during treatment. Eat small meals several times a day to keep your strength up.  ¨ Choose bland foods with little taste or smell if you are reacting strongly to food.  ¨ Be sure to cook all food thoroughly. This kills bacteria and helps you avoid infection.  ¨ Eat foods that are soft. They are less likely to cause stomach irritation.  · Keep clean. During treatment your body cant fight germs very well.  ¨ Take short baths or showers with warm water. Avoid very hot or cold water.  ¨ Use moisturizing soap. Treatment can make your skin dry.  ¨ Apply moisturizing lotion several times a day to help relieve dry skin.  Follow-up care  Make a follow-up appointment as directed by our staff.     When to seek medical care  Call your doctor right away if you have any of the following:  · Fever of 100.4°F  (38.0°C) or higher, or chills  · Any unusual bleeding  · New or unusual lumps, bumps, or swelling  · Signs of infection around the incision (redness, drainage, warmth, pain)  · Incision that opens up or pulls apart  · Trouble concentrating  · Ongoing fatigue  · Shortness of breath  · Rapid, irregular heartbeat  · Dizziness, lightheadedness  · Constant feeling of being cold  · Yellowing of the skin or eyes  · Light-colored stools  · Pain in the liver area  · Blood in your stools  · Vomiting blood  · Increasing abdominal size  · Yellow eyes  · Mental confusion   Date Last Reviewed: 3/30/2014  © 0922-1975 [employer]. [address] [place] [address], [place] [address]. All rights reserved. This information is not intended as a substitute for professional medical care. Always follow your healthcare professional's instructions.

## 2018-09-07 NOTE — PLAN OF CARE
Problem: Patient Care Overview  Goal: Plan of Care Review  Outcome: Ongoing (interventions implemented as appropriate)  Patient on RA. Sats between %. Patient not in distress.

## 2018-09-08 NOTE — DISCHARGE SUMMARY
[facility] Hospital Medicine  Discharge Summary      Patient Name: Ronnie Sandoval  MRN: [id number]  Admission Date: 9/6/2018  Hospital Length of Stay: 1 days  Discharge Date and Time: 9/7/2018  6:48 PM  Attending Physician: No att. providers found   Discharging Provider: Mara Cox MD  Primary Care Provider: Daniel Hoover MD      HPI:   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 and secondary malignant neoplasm of bone, ckd, chronic leg swelling was sent by home nurse for worsening abdominal swelling and anemia.Patient c/o worsening abdominal swelling gradually over the last few months.He denies abdominal pain, chest pain, shortness of breath.Per patient and wife , he is able to do his activities of daily living, except for the last week when he has been getting weak.He denies any blood in stools.Patient has h/o chronic anemia and his last  H/h in 03/18 was 6.1 and 21 which was similar to today at 6.6.Patient was tried on chemo but he had side effects and did not want further treatment for his cancer, has not seen hem oncologist since last year.He did have left inguinal lymph node excision and per patient rectal mass excision when he had thyroid surgery for thyroid cancer.He does have chronic leg swelling and is on lasix, was tried on metalozone by cardiology but was making him dehydrated , so discontinued.His echo in 03/18 showed ef of 40-45%, grade 1 diastolic dysfunction, pa pressure 25.His last abdominal us in 10/17 did not show ascites.He also has history of left lower leg dvt for which was on lovenox at one point which was discontinued due to anemia and black stools.IVC filter could not be placed as Inferior venacavogram demonstrated chronic appearing thrombosis in the infrarenal IVC with collateral venous formation; the suprarenal IVC demonstrates narrowing.No suitable region for IVC filter deployment    Ct abdomen  today showed Enlarged heterogeneous liver with innumerable masses suggesting metastatic disease.Moderate amount of intra-abdominal ascites with diffuse body wall anasarca.Large destructive soft tissue mass centered in the left pubis and extending to involve the right pubis.    Procedures: none    Hospital Course:   Patient was transfused 2 units of blood with mild h/h improvement to 7.3/24.2.No active bleeding reported.His creatinine was stable.He was started on iv lasix with improvement in his leg swelling.IR was consulted for paracentesis but there was only minimal fluid on imaging , so it was not done.Most of patients abdominal swelling was due to hepatomegaly.Hem oncology consult was obtained and treatment options were discussed of either trying everolimus which the wife said that they did not want to take it due to side effects or hospice.Patient and family were initially interested in hospice at discharge but the next day when case management called to set up hospice, the wife said they have changed their mind and do not want hospice.He was continued on his home meds.     Consults:   Consults (From admission, onward)        Status Ordering Provider     Inpatient consult to Hematology/Oncology  Once     Provider:  Luis Chandler MD    Completed JUSTEN LEIGH     Inpatient consult to Interventional Radiology  Once     Provider:  (Not yet assigned)    Completed JUSTEN LEIGH          No new Assessment & Plan notes have been filed under this hospital service since the last note was generated.  Service: Hospital Medicine    Final Active Diagnoses:    Diagnosis Date Noted POA    PRINCIPAL PROBLEM:  Anemia of chronic disease [D63.8] 09/06/2018 Yes    Other ascites [R18.8] 09/06/2018 Yes    Primary malignant neuroendocrine tumor of rectum [C7A.8] 05/26/2015 Yes    Leg swelling [M79.89] 09/06/2018 Yes    Chronic diastolic heart failure [I50.32] 09/06/2018 Yes    Stage 3 chronic kidney disease [N18.3] 10/02/2017  Yes    Post-surgical hypothyroidism [E89.0] 03/11/2015 Yes    Hypertension [I10]  Yes      Problems Resolved During this Admission:       Discharged Condition: stable    Disposition: Home or Self Care    Follow Up:  Follow-up Information     Daniel Hoover MD In 2 weeks.    Specialty:  Internal Medicine  Contact information:  [address]  [address]  [facility] [address]  [phone]             Luis Chandler MD.    Specialty:  Hematology and Oncology  Why:  if patient wants  Contact information:  [address]  [address]  [facility] [id number] [phone]                 Patient Instructions:      Diet Cardiac     Activity as tolerated       Significant Diagnostic Studies:  CT Abdomen Pelvis  Without Contrast  Narrative: EXAMINATION:  CT ABDOMEN PELVIS WITHOUT CONTRAST    CLINICAL HISTORY:  abdominal pain;  history of neuroendocrine tumor with left groin mass.    TECHNIQUE:  Low dose axial images, sagittal and coronal reformations were obtained from the lung bases to the pubic symphysis.  Oral contrast was not administered.    COMPARISON:  Complete abdominal ultrasound 10/04/2017    FINDINGS:  Heart: Normal size. No effusion.    Lung Bases: Subsegmental atelectasis is present in the right lung base.    Liver: Markedly enlarged and heterogeneous with numerous hypodense masses throughout the liver parenchyma.    Gallbladder: Not definitively visualized.    Bile Ducts: No definite ductal dilatation.    Pancreas: Pancreatic body and head are poorly delineated, with suggestion of masses in this lesion, though at least some of these may be originating from the liver.    Spleen: Normal.    Adrenals: Normal.    Kidneys/Ureters: No mass, hydroureteronephrosis, or nephroureterolithiasis.    Bladder: No wall thickening.    Reproductive organs: Normal.    GI Tract/Mesentery: No evidence of bowel obstruction or inflammation. Appendix is normal.    Peritoneal Space: Moderate amount of free fluid throughout the  abdomen and pelvis.    Retroperitoneum: No definite adenopathy, though close opposition of soft tissue structures and absence of intravenous contrast degrades evaluation.    Abdominal wall: Mild diffuse body wall anasarca.    Vasculature: No aneurysm.    Bones: Soft tissue mass centered within the left pubis extends to involve the right pubis and overall measures approximately 11.9 x 8.4 x 9.0 cm.  Impression: Enlarged heterogeneous liver with innumerable masses suggesting metastatic disease.    Moderate amount of intra-abdominal ascites with diffuse body wall anasarca.    Large destructive soft tissue mass centered in the left pubis and extending to involve the right pubis.    Electronically signed by: Quinten Diego MD  Date:    09/06/2018  Time:    13:12  X-Ray Chest 1 View  Narrative: EXAMINATION:  XR CHEST 1 VIEW    CLINICAL HISTORY:  Shortness of breath    TECHNIQUE:  Single frontal portable view of the chest was performed.    COMPARISON:  Chest radiograph 07/29/2017    FINDINGS:  Support devices: None    There is mild elevation of the right hemidiaphragm with atelectasis at the right lung base.  Lungs are otherwise clear.  No pleural effusion or pneumothorax.    The cardiac silhouette is normal in size. The hilar and mediastinal contours are unremarkable.    No acute fracture.  Remote right 7th rib fracture is present.  DJD is present in the shoulders and spine.  Impression: Mild elevation of the right hemidiaphragm with atelectasis at the right lung base.    Electronically signed by: Quinten Diego MD  Date:    09/06/2018  Time:    11:32        Pending Diagnostic Studies:     None         Medications:  Reconciled Home Medications:      Medication List      CHANGE how you take these medications    KLOR-CON M20 20 MEQ tablet  Generic drug:  potassium chloride SA  TAKE ONE TABLET BY MOUTH TWICE DAILY  What changed:  See the new instructions.        CONTINUE taking these medications    CENTRUM SILVER MEN ORAL  Take 1  "tablet by mouth once daily.     ferrous sulfate 325 mg (65 mg iron) Tab tablet  Take 325 mg by mouth 2 (two) times daily.     furosemide 80 MG tablet  Commonly known as:  LASIX  Take 1 tablet (80 mg total) by mouth 2 (two) times daily.     levothyroxine 75 MCG tablet  Commonly known as:  SYNTHROID  Take 1 tablet (75 mcg total) by mouth before breakfast.     losartan 100 MG tablet  Commonly known as:  COZAAR  Take 1 tablet (100 mg total) by mouth once daily.     metoprolol succinate 200 MG 24 hr tablet  Commonly known as:  TOPROL-XL  Take 1 tablet (200 mg total) by mouth once daily.     needle (disp) 25 gauge 25 gauge x 5/8" Ndle  Commonly known as:  BD REGULAR BEVEL NEEDLES  Pt to inject BID     traMADol 50 mg tablet  Commonly known as:  ULTRAM  Take 1 tablet (50 mg total) by mouth every 8 (eight) hours as needed for Pain.     VITAMIN D ORAL  Take 1 tablet by mouth once daily.            Indwelling Lines/Drains at time of discharge:   Lines/Drains/Airways          None          Time spent on the discharge of patient: 45  minutes  Patient was seen and examined on the date of discharge and determined to be suitable for discharge.         Mara Cox MD  Department of Hospital Medicine  [facility]  "

## 2018-09-08 NOTE — HOSPITAL COURSE
Patient was transfused 2 units of blood with mild h/h improvement to 7.3/24.2.No active bleeding reported.His creatinine was stable.He was started on iv lasix with improvement in his leg swelling.IR was consulted for paracentesis but there was only minimal fluid on imaging , so it was not done.Most of patients abdominal swelling was due to hepatomegaly.Hem oncology consult was obtained and treatment options were discussed of either trying everolimus which the wife said that they did not want to take it due to side effects or hospice.Patient and family were initially interested in hospice at discharge but the next day when case management called to set up hospice, the wife said they have changed their mind and do not want hospice.He was continued on his home meds.

## 2018-09-08 NOTE — PLAN OF CARE
"9/8/18 10:15am Informed by Dr Cox that patient discharged home yesterday pm & would like hospice to be set up today. Spoke with wife Selene to obtain preferences & discuss hospice referral - wife reports they have changed their mind & are not interested in hospice care at this time. Wife reports "our only need is for a shower chair" - informed wife shower chair was not a covered item & how to obtain one      09/08/18 1020   Final Note   Assessment Type Final Discharge Note   Discharge Disposition Home   What phone number can be called within the next 1-3 days to see how you are doing after discharge? [phone]   Discharge plans and expectations educations in teach back method with documentation complete? Yes   Right Care Referral Info   Post Acute Recommendation No Care     "

## 2018-09-11 LAB
BACTERIA BLD CULT: NORMAL
BACTERIA BLD CULT: NORMAL

## 2018-09-24 RX ORDER — METOPROLOL SUCCINATE 200 MG/1
TABLET, EXTENDED RELEASE ORAL
Qty: 90 TABLET | Refills: 2 | Status: SHIPPED | OUTPATIENT
Start: 2018-09-24

## 2018-10-09 RX ORDER — LOSARTAN POTASSIUM 100 MG/1
TABLET ORAL
Qty: 90 TABLET | Refills: 2 | Status: SHIPPED | OUTPATIENT
Start: 2018-10-09

## 2018-10-15 ENCOUNTER — LAB VISIT (OUTPATIENT)
Dept: LAB | Facility: OTHER | Age: 63
End: 2018-10-15
Attending: INTERNAL MEDICINE
Payer: MEDICARE

## 2018-10-15 ENCOUNTER — OFFICE VISIT (OUTPATIENT)
Dept: INTERNAL MEDICINE | Facility: CLINIC | Age: 63
End: 2018-10-15
Attending: INTERNAL MEDICINE
Payer: MEDICARE

## 2018-10-15 VITALS
SYSTOLIC BLOOD PRESSURE: 130 MMHG | HEIGHT: 69 IN | DIASTOLIC BLOOD PRESSURE: 72 MMHG | HEART RATE: 86 BPM | WEIGHT: 187.19 LBS | OXYGEN SATURATION: 97 % | BODY MASS INDEX: 27.73 KG/M2

## 2018-10-15 DIAGNOSIS — E03.9 HYPOTHYROIDISM, UNSPECIFIED TYPE: ICD-10-CM

## 2018-10-15 DIAGNOSIS — R79.9 ABNORMAL FINDING OF BLOOD CHEMISTRY: ICD-10-CM

## 2018-10-15 DIAGNOSIS — N18.30 STAGE 3 CHRONIC KIDNEY DISEASE: ICD-10-CM

## 2018-10-15 DIAGNOSIS — C7A.8 NEUROENDOCRINE CARCINOMA, UNKNOWN PRIMARY SITE: ICD-10-CM

## 2018-10-15 DIAGNOSIS — R18.8 OTHER ASCITES: ICD-10-CM

## 2018-10-15 DIAGNOSIS — I50.32 CHRONIC DIASTOLIC HEART FAILURE: ICD-10-CM

## 2018-10-15 DIAGNOSIS — I10 ESSENTIAL HYPERTENSION: ICD-10-CM

## 2018-10-15 DIAGNOSIS — C7B.8 SECONDARY NEUROENDOCRINE TUMOR OF LIVER: ICD-10-CM

## 2018-10-15 DIAGNOSIS — D69.6 THROMBOCYTOPENIA: Primary | ICD-10-CM

## 2018-10-15 LAB
ALBUMIN SERPL BCP-MCNC: 3.1 G/DL
ALP SERPL-CCNC: 103 U/L
ALT SERPL W/O P-5'-P-CCNC: 11 U/L
ANION GAP SERPL CALC-SCNC: 12 MMOL/L
AST SERPL-CCNC: 17 U/L
BILIRUB SERPL-MCNC: 2.9 MG/DL
BUN SERPL-MCNC: 49 MG/DL
CALCIUM SERPL-MCNC: 9.6 MG/DL
CHLORIDE SERPL-SCNC: 105 MMOL/L
CO2 SERPL-SCNC: 30 MMOL/L
CREAT SERPL-MCNC: 2 MG/DL
EST. GFR  (AFRICAN AMERICAN): 39 ML/MIN/1.73 M^2
EST. GFR  (NON AFRICAN AMERICAN): 34 ML/MIN/1.73 M^2
ESTIMATED AVG GLUCOSE: ABNORMAL MG/DL
GLUCOSE SERPL-MCNC: 80 MG/DL
HBA1C MFR BLD HPLC: <4 %
POTASSIUM SERPL-SCNC: 3.6 MMOL/L
PROT SERPL-MCNC: 6.7 G/DL
SODIUM SERPL-SCNC: 147 MMOL/L
TSH SERPL DL<=0.005 MIU/L-ACNC: 3.18 UIU/ML

## 2018-10-15 PROCEDURE — 99213 OFFICE O/P EST LOW 20 MIN: CPT | Mod: PBBFAC | Performed by: INTERNAL MEDICINE

## 2018-10-15 PROCEDURE — 36415 COLL VENOUS BLD VENIPUNCTURE: CPT

## 2018-10-15 PROCEDURE — 83036 HEMOGLOBIN GLYCOSYLATED A1C: CPT

## 2018-10-15 PROCEDURE — 3008F BODY MASS INDEX DOCD: CPT | Mod: CPTII,,, | Performed by: INTERNAL MEDICINE

## 2018-10-15 PROCEDURE — 3075F SYST BP GE 130 - 139MM HG: CPT | Mod: CPTII,,, | Performed by: INTERNAL MEDICINE

## 2018-10-15 PROCEDURE — 99999 PR PBB SHADOW E&M-EST. PATIENT-LVL III: CPT | Mod: PBBFAC,,, | Performed by: INTERNAL MEDICINE

## 2018-10-15 PROCEDURE — 99499 UNLISTED E&M SERVICE: CPT | Mod: S$GLB,,, | Performed by: INTERNAL MEDICINE

## 2018-10-15 PROCEDURE — 1101F PT FALLS ASSESS-DOCD LE1/YR: CPT | Mod: CPTII,,, | Performed by: INTERNAL MEDICINE

## 2018-10-15 PROCEDURE — 3078F DIAST BP <80 MM HG: CPT | Mod: CPTII,,, | Performed by: INTERNAL MEDICINE

## 2018-10-15 PROCEDURE — 84443 ASSAY THYROID STIM HORMONE: CPT

## 2018-10-15 PROCEDURE — 99214 OFFICE O/P EST MOD 30 MIN: CPT | Mod: S$PBB,,, | Performed by: INTERNAL MEDICINE

## 2018-10-15 PROCEDURE — 80053 COMPREHEN METABOLIC PANEL: CPT

## 2018-10-15 RX ORDER — FUROSEMIDE 80 MG/1
120 TABLET ORAL 2 TIMES DAILY
Qty: 180 TABLET | Refills: 1 | Status: SHIPPED | OUTPATIENT
Start: 2018-10-15 | End: 2019-03-22 | Stop reason: SDUPTHER

## 2018-10-15 NOTE — PROGRESS NOTES
"Subjective:       Patient ID: Ronnie Sandoval is a 63 y.o. male.    Chief Complaint: Follow-up (heaviness in legs x's 3 months )    Here for f/u     62 yo h/o malignant carcinoid tumor of rectum with secondary neuroendocrine tumor of liver and secondary malignant neoplasm of bone, ckd, chronic leg swelling.     His concern is about the chronic debilitating swelling in his legs. He is sedentary. He is taking his lasix 2 tabs once instead of once twice a day. He denies orthopnea, PND. He is on metoprolol. He was admitted one month prior for low H/H and transfused 2 units. He was on metolazone prior but this dehydrated him. He was seen by oncology who offered tevrolimus vs hospice. Hospice was chosen but this was redacted by pt and wife the following day.         Review of Systems    Objective:      Vitals:    10/15/18 1025   BP: 130/72   Pulse: 86   SpO2: 97%   Weight: 84.9 kg (187 lb 2.7 oz)   Height: 5' 9" (1.753 m)      Physical Exam   Constitutional: He is oriented to person, place, and time. He appears well-developed and well-nourished. No distress.   HENT:   Head: Normocephalic and atraumatic.   Mouth/Throat: Oropharynx is clear and moist. No oropharyngeal exudate.   Eyes: Conjunctivae and EOM are normal. Pupils are equal, round, and reactive to light. No scleral icterus.   Neck: No thyromegaly present.   Cardiovascular: Normal rate, regular rhythm and normal heart sounds.   No murmur heard.  Pulmonary/Chest: Effort normal and breath sounds normal. He has no wheezes. He has no rales.   Abdominal: Soft. He exhibits distension. There is no tenderness.   Musculoskeletal: He exhibits edema (2+ to knee). He exhibits no tenderness.   Lymphadenopathy:     He has no cervical adenopathy.   Neurological: He is alert and oriented to person, place, and time.   Skin: Skin is warm and dry.   Psychiatric: He has a normal mood and affect. His behavior is normal.       Assessment:       1. Thrombocytopenia    2. Essential " hypertension    3. Secondary neuroendocrine tumor of liver    4. Neuroendocrine carcinoma, unknown primary site    5. Stage 3 chronic kidney disease    6. Other ascites    7. Chronic diastolic heart failure    8. Hypothyroidism, unspecified type    9. Abnormal finding of blood chemistry         Plan:       Ronnie was seen today for follow-up.    Diagnoses and all orders for this visit:    Thrombocytopenia  -     Cancel: CBC auto differential; Future    Essential hypertension   -rec he take his lasix 1.5 tabs (120mg) BID and repeat labs in 2 weeks   -     Hemoglobin A1c; Future    Secondary neuroendocrine tumor of liver     Neuroendocrine carcinoma, unknown primary site  -     Comprehensive metabolic panel; Future  -     Hemoglobin A1c; Future  -     CANE FOR HOME USE    Stage 3 chronic kidney disease  -     Comprehensive metabolic panel; Future  -     Hemoglobin A1c; Future    Other ascites  -     CANE FOR HOME USE    Chronic diastolic heart failure  -     CANE FOR HOME USE    Hypothyroidism, unspecified type  -     TSH; Future    Abnormal finding of blood chemistry   -     Hemoglobin A1c; Future                     Side effects of medication(s) were discussed in detail and patient voiced understanding.  Patient will call back for any issues or complications.

## 2018-10-16 ENCOUNTER — TELEPHONE (OUTPATIENT)
Dept: INTERNAL MEDICINE | Facility: CLINIC | Age: 63
End: 2018-10-16

## 2018-10-16 NOTE — TELEPHONE ENCOUNTER
Spoke with Mrs. Covarrubiasen to confirm with her that the fax to [employer] was sent over.  Jaime

## 2018-10-16 NOTE — TELEPHONE ENCOUNTER
----- Message from Dipika Romero sent at 10/16/2018  2:05 PM CDT -----  Contact: Pt wife            Name of Who is Calling: pt wife      What is the request in detail: [facility] is waiting for the patient's order for his walking cane and is needing it sent over      Can the clinic reply by [facility]: no      What Number to Call Back [facility]: [phone]

## 2018-10-16 NOTE — TELEPHONE ENCOUNTER
Spoke to Mr. Sandoval to inform him that his labs  Are stable.  Mr. Sandoval also asked about a walker that was mentioned at his office visit.      Usha Scott LPN

## 2018-10-18 ENCOUNTER — TELEPHONE (OUTPATIENT)
Dept: INTERNAL MEDICINE | Facility: CLINIC | Age: 63
End: 2018-10-18

## 2018-10-18 NOTE — TELEPHONE ENCOUNTER
Messaged Mr. Sandoval to inform him that his paper work has been faxed to [facility].    Usha Scott LPN

## 2018-12-07 ENCOUNTER — OFFICE VISIT (OUTPATIENT)
Dept: INTERNAL MEDICINE | Facility: CLINIC | Age: 63
End: 2018-12-07
Attending: INTERNAL MEDICINE
Payer: MEDICARE

## 2018-12-07 ENCOUNTER — HOSPITAL ENCOUNTER (OUTPATIENT)
Dept: RADIOLOGY | Facility: OTHER | Age: 63
Discharge: HOME | End: 2018-12-07
Attending: INTERNAL MEDICINE
Payer: MEDICARE

## 2018-12-07 ENCOUNTER — TELEPHONE (OUTPATIENT)
Dept: INTERNAL MEDICINE | Facility: CLINIC | Age: 63
End: 2018-12-07

## 2018-12-07 VITALS
BODY MASS INDEX: 27.75 KG/M2 | SYSTOLIC BLOOD PRESSURE: 130 MMHG | OXYGEN SATURATION: 96 % | HEIGHT: 69 IN | HEART RATE: 88 BPM | DIASTOLIC BLOOD PRESSURE: 74 MMHG | WEIGHT: 187.38 LBS

## 2018-12-07 DIAGNOSIS — D61.89 ANEMIA DUE TO OTHER BONE MARROW FAILURE: Primary | ICD-10-CM

## 2018-12-07 DIAGNOSIS — M25.551 PAIN OF RIGHT HIP JOINT: ICD-10-CM

## 2018-12-07 DIAGNOSIS — M54.50 LOW BACK PAIN, NON-SPECIFIC: ICD-10-CM

## 2018-12-07 DIAGNOSIS — C7A.8 NEUROENDOCRINE CARCINOMA, UNKNOWN PRIMARY SITE: ICD-10-CM

## 2018-12-07 PROCEDURE — 72100 X-RAY EXAM L-S SPINE 2/3 VWS: CPT | Mod: TC,FY

## 2018-12-07 PROCEDURE — 3078F DIAST BP <80 MM HG: CPT | Mod: CPTII,S$GLB,, | Performed by: INTERNAL MEDICINE

## 2018-12-07 PROCEDURE — 73502 X-RAY EXAM HIP UNI 2-3 VIEWS: CPT | Mod: 26,RT,, | Performed by: RADIOLOGY

## 2018-12-07 PROCEDURE — 1101F PT FALLS ASSESS-DOCD LE1/YR: CPT | Mod: CPTII,S$GLB,, | Performed by: INTERNAL MEDICINE

## 2018-12-07 PROCEDURE — 99214 PR OFFICE/OUTPT VISIT, EST, LEVL IV, 30-39 MIN: ICD-10-PCS | Mod: S$GLB,,, | Performed by: INTERNAL MEDICINE

## 2018-12-07 PROCEDURE — 3008F PR BODY MASS INDEX (BMI) DOCUMENTED: ICD-10-PCS | Mod: CPTII,S$GLB,, | Performed by: INTERNAL MEDICINE

## 2018-12-07 PROCEDURE — 99214 OFFICE O/P EST MOD 30 MIN: CPT | Mod: S$GLB,,, | Performed by: INTERNAL MEDICINE

## 2018-12-07 PROCEDURE — 1101F PR PT FALLS ASSESS DOC 0-1 FALLS W/OUT INJ PAST YR: ICD-10-PCS | Mod: CPTII,S$GLB,, | Performed by: INTERNAL MEDICINE

## 2018-12-07 PROCEDURE — 3075F PR MOST RECENT SYSTOLIC BLOOD PRESS GE 130-139MM HG: ICD-10-PCS | Mod: CPTII,S$GLB,, | Performed by: INTERNAL MEDICINE

## 2018-12-07 PROCEDURE — 73502 X-RAY EXAM HIP UNI 2-3 VIEWS: CPT | Mod: TC,FY,RT

## 2018-12-07 PROCEDURE — 3008F BODY MASS INDEX DOCD: CPT | Mod: CPTII,S$GLB,, | Performed by: INTERNAL MEDICINE

## 2018-12-07 PROCEDURE — 3075F SYST BP GE 130 - 139MM HG: CPT | Mod: CPTII,S$GLB,, | Performed by: INTERNAL MEDICINE

## 2018-12-07 PROCEDURE — 99999 PR PBB SHADOW E&M-EST. PATIENT-LVL IV: ICD-10-PCS | Mod: PBBFAC,,, | Performed by: INTERNAL MEDICINE

## 2018-12-07 PROCEDURE — 3078F PR MOST RECENT DIASTOLIC BLOOD PRESSURE < 80 MM HG: ICD-10-PCS | Mod: CPTII,S$GLB,, | Performed by: INTERNAL MEDICINE

## 2018-12-07 PROCEDURE — 99999 PR PBB SHADOW E&M-EST. PATIENT-LVL IV: CPT | Mod: PBBFAC,,, | Performed by: INTERNAL MEDICINE

## 2018-12-07 PROCEDURE — 72100 X-RAY EXAM L-S SPINE 2/3 VWS: CPT | Mod: 26,,, | Performed by: RADIOLOGY

## 2018-12-07 RX ORDER — TRAMADOL HYDROCHLORIDE 50 MG/1
50 TABLET ORAL EVERY 8 HOURS PRN
Qty: 60 TABLET | Refills: 0 | Status: SHIPPED | OUTPATIENT
Start: 2018-12-07 | End: 2019-04-08 | Stop reason: SDUPTHER

## 2018-12-07 NOTE — TELEPHONE ENCOUNTER
----- Message from Jacinda Preston sent at 12/7/2018  2:07 PM CST -----  Contact: josue  Name of Who is Calling: josue from [facility]      What is the request in detail: Josue is requesting a call back he needs the dx code for the traMADol (ULTRAM) 50 mg tablet medication he needs to know if it for a chronic pain       Can the clinic reply by [facility]: no      What Number to Call Back if not in [facility]: [phone]

## 2018-12-07 NOTE — PROGRESS NOTES
"Subjective:       Patient ID: Ronnie Sandoval is a 63 y.o. male.    Chief Complaint: Groin Pain     64 yo h/o malignant carcinoid tumor of rectum with secondary neuroendocrine tumor of liver and secondary malignant neoplasm of bone, ckd, chronic lymphedema presents for urgent visit. He is here today with continued pain of lower abdomen and groin. NO change in quality or frequency. He denies dysuria, F/C, change in bowels, emesis. Continued fatigue. He and his wife would like his H/H drawn.        Review of Systems    Objective:      Vitals:    12/07/18 1315   BP: 130/74   Pulse: 88   SpO2: 96%   Weight: 85 kg (187 lb 6.3 oz)   Height: 5' 9" (1.753 m)      Physical Exam    Assessment:       1. 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 Pain of right hip joint    3. Low back pain, non-specific    4. Neuroendocrine carcinoma, unknown primary site        Plan:       Ronnie was seen today for groin pain.    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   -     CBC auto differential; Future  -     Comprehensive metabolic panel; Future    Pain of right hip joint  -     X-Ray Hip 2 View Right; Future    Low back pain, non-specific  -     X-Ray Lumbar Spine AP And Lateral; Future    Neuroendocrine carcinoma, unknown primary site    Other orders  -     traMADol (ULTRAM) 50 mg tablet; Take 1 tablet (50 mg total) by mouth every 8 (eight) hours as needed for Pain.                 Side effects of medication(s) were discussed in detail and patient voiced understanding.  Patient will call back for any issues or complications.   "

## 2018-12-08 ENCOUNTER — PATIENT MESSAGE (OUTPATIENT)
Dept: INTERNAL MEDICINE | Facility: CLINIC | Age: 63
End: 2018-12-08

## 2018-12-28 DIAGNOSIS — N18.9 CHRONIC KIDNEY DISEASE, UNSPECIFIED CKD STAGE: ICD-10-CM

## 2019-01-07 RX ORDER — LEVOTHYROXINE SODIUM 75 UG/1
TABLET ORAL
Qty: 90 TABLET | Refills: 3 | Status: SHIPPED | OUTPATIENT
Start: 2019-01-07

## 2019-02-25 ENCOUNTER — LAB VISIT (OUTPATIENT)
Dept: LAB | Facility: OTHER | Age: 64
End: 2019-02-25
Attending: INTERNAL MEDICINE
Payer: MEDICARE

## 2019-02-25 ENCOUNTER — OFFICE VISIT (OUTPATIENT)
Dept: INTERNAL MEDICINE | Facility: CLINIC | Age: 64
End: 2019-02-25
Attending: INTERNAL MEDICINE
Payer: MEDICARE

## 2019-02-25 ENCOUNTER — TELEPHONE (OUTPATIENT)
Dept: INTERNAL MEDICINE | Facility: CLINIC | Age: 64
End: 2019-02-25

## 2019-02-25 VITALS
WEIGHT: 192.88 LBS | HEIGHT: 69 IN | SYSTOLIC BLOOD PRESSURE: 120 MMHG | DIASTOLIC BLOOD PRESSURE: 80 MMHG | HEART RATE: 81 BPM | BODY MASS INDEX: 28.57 KG/M2 | OXYGEN SATURATION: 99 %

## 2019-02-25 DIAGNOSIS — Z91.89 AT RISK FOR SIDE EFFECT OF MEDICATION: ICD-10-CM

## 2019-02-25 DIAGNOSIS — E89.0 POST-SURGICAL HYPOTHYROIDISM: ICD-10-CM

## 2019-02-25 DIAGNOSIS — C7A.8 NEUROENDOCRINE CARCINOMA, UNKNOWN PRIMARY SITE: Primary | ICD-10-CM

## 2019-02-25 LAB
ALBUMIN SERPL BCP-MCNC: 2.9 G/DL
ALP SERPL-CCNC: 88 U/L
ALT SERPL W/O P-5'-P-CCNC: 12 U/L
ANION GAP SERPL CALC-SCNC: 11 MMOL/L
AST SERPL-CCNC: 19 U/L
BILIRUB SERPL-MCNC: 2.5 MG/DL
BUN SERPL-MCNC: 56 MG/DL
CALCIUM SERPL-MCNC: 9.7 MG/DL
CHLORIDE SERPL-SCNC: 104 MMOL/L
CO2 SERPL-SCNC: 29 MMOL/L
CREAT SERPL-MCNC: 2 MG/DL
EST. GFR  (AFRICAN AMERICAN): 39 ML/MIN/1.73 M^2
EST. GFR  (NON AFRICAN AMERICAN): 34 ML/MIN/1.73 M^2
GLUCOSE SERPL-MCNC: 77 MG/DL
POTASSIUM SERPL-SCNC: 3.9 MMOL/L
PROT SERPL-MCNC: 6.7 G/DL
SODIUM SERPL-SCNC: 144 MMOL/L
TSH SERPL DL<=0.005 MIU/L-ACNC: 3.47 UIU/ML

## 2019-02-25 PROCEDURE — 99214 OFFICE O/P EST MOD 30 MIN: CPT | Mod: S$GLB,,, | Performed by: INTERNAL MEDICINE

## 2019-02-25 PROCEDURE — 36415 COLL VENOUS BLD VENIPUNCTURE: CPT

## 2019-02-25 PROCEDURE — 3074F PR MOST RECENT SYSTOLIC BLOOD PRESSURE < 130 MM HG: ICD-10-PCS | Mod: CPTII,S$GLB,, | Performed by: INTERNAL MEDICINE

## 2019-02-25 PROCEDURE — 99999 PR PBB SHADOW E&M-EST. PATIENT-LVL IV: CPT | Mod: PBBFAC,,, | Performed by: INTERNAL MEDICINE

## 2019-02-25 PROCEDURE — 3008F PR BODY MASS INDEX (BMI) DOCUMENTED: ICD-10-PCS | Mod: CPTII,S$GLB,, | Performed by: INTERNAL MEDICINE

## 2019-02-25 PROCEDURE — 3079F PR MOST RECENT DIASTOLIC BLOOD PRESSURE 80-89 MM HG: ICD-10-PCS | Mod: CPTII,S$GLB,, | Performed by: INTERNAL MEDICINE

## 2019-02-25 PROCEDURE — 3079F DIAST BP 80-89 MM HG: CPT | Mod: CPTII,S$GLB,, | Performed by: INTERNAL MEDICINE

## 2019-02-25 PROCEDURE — 1101F PR PT FALLS ASSESS DOC 0-1 FALLS W/OUT INJ PAST YR: ICD-10-PCS | Mod: CPTII,S$GLB,, | Performed by: INTERNAL MEDICINE

## 2019-02-25 PROCEDURE — 84443 ASSAY THYROID STIM HORMONE: CPT

## 2019-02-25 PROCEDURE — 3074F SYST BP LT 130 MM HG: CPT | Mod: CPTII,S$GLB,, | Performed by: INTERNAL MEDICINE

## 2019-02-25 PROCEDURE — 99499 UNLISTED E&M SERVICE: CPT | Mod: S$GLB,,, | Performed by: INTERNAL MEDICINE

## 2019-02-25 PROCEDURE — 1101F PT FALLS ASSESS-DOCD LE1/YR: CPT | Mod: CPTII,S$GLB,, | Performed by: INTERNAL MEDICINE

## 2019-02-25 PROCEDURE — 80053 COMPREHEN METABOLIC PANEL: CPT

## 2019-02-25 PROCEDURE — 99214 PR OFFICE/OUTPT VISIT, EST, LEVL IV, 30-39 MIN: ICD-10-PCS | Mod: S$GLB,,, | Performed by: INTERNAL MEDICINE

## 2019-02-25 PROCEDURE — 99499 RISK ADDL DX/OHS AUDIT: ICD-10-PCS | Mod: S$GLB,,, | Performed by: INTERNAL MEDICINE

## 2019-02-25 PROCEDURE — 99999 PR PBB SHADOW E&M-EST. PATIENT-LVL IV: ICD-10-PCS | Mod: PBBFAC,,, | Performed by: INTERNAL MEDICINE

## 2019-02-25 PROCEDURE — 3008F BODY MASS INDEX DOCD: CPT | Mod: CPTII,S$GLB,, | Performed by: INTERNAL MEDICINE

## 2019-02-25 RX ORDER — ASPIRIN 325 MG
50000 TABLET, DELAYED RELEASE (ENTERIC COATED) ORAL WEEKLY
Qty: 12 CAPSULE | Refills: 3 | Status: SHIPPED | OUTPATIENT
Start: 2019-02-25

## 2019-02-25 NOTE — TELEPHONE ENCOUNTER
[facility] does not accept pts. Insurance. [facility].   Jaime

## 2019-02-25 NOTE — PROGRESS NOTES
Patient, Ronnie Sandoval (MRN #[id number]), presented with a recent Estimated Glumerular Filtration Rate (EGFR) between 30 and 45 consistent with the definition of chronic kidney disease stage 3 - moderate (ICD10 - N18.3).    eGFR if    Date Value Ref Range Status   02/25/2019 39 (A) >60 mL/min/1.73 m^2 Final       The patient's chronic kidney disease stage 3 was monitored, evaluated, addressed and/or treated. This addendum to the medical record is made on 02/25/2019.

## 2019-02-25 NOTE — PROGRESS NOTES
"Subjective:       Patient ID: Ronnie Sandoval is a 63 y.o. male.    Chief Complaint: Extremity Weakness (x's 1 month )    Here for urgent visit    Incerased fatigue. Legs and abdomen remain swollen. He is not wearing compression stockings. Overall energy levels are declining. He is taking lasix and potassium. Adherent with levothyroxine. No increase in SOB or abd pain. His appetite is good.         Review of Systems   Constitutional: Positive for fatigue. Negative for appetite change, chills, fever and unexpected weight change.   HENT: Negative for hearing loss, sore throat and trouble swallowing.    Eyes: Negative for visual disturbance.   Respiratory: Negative for cough, chest tightness and shortness of breath.    Cardiovascular: Negative for chest pain and leg swelling.   Gastrointestinal: Negative for abdominal pain, blood in stool, constipation, diarrhea, nausea and vomiting.   Endocrine: Negative for polydipsia and polyuria.   Genitourinary: Negative for decreased urine volume, difficulty urinating, dysuria, frequency and urgency.   Musculoskeletal: Negative for gait problem.   Skin: Negative for rash.   Neurological: Negative for dizziness and numbness.   Psychiatric/Behavioral: The patient is not nervous/anxious.        Objective:      Vitals:    02/25/19 1017   BP: 120/80   Pulse: 81   SpO2: 99%   Weight: 87.5 kg (192 lb 14.4 oz)   Height: 5' 9" (1.753 m)      Physical Exam   Constitutional: He is oriented to person, place, and time. No distress.   HENT:   Head: Normocephalic and atraumatic.   Mouth/Throat: Oropharynx is clear and moist. No oropharyngeal exudate.   Eyes: Conjunctivae and EOM are normal. Pupils are equal, round, and reactive to light. No scleral icterus.   Neck: No thyromegaly present.   Cardiovascular: Normal rate, regular rhythm and normal heart sounds.   No murmur heard.  Pulmonary/Chest: Effort normal and breath sounds normal. He has no wheezes. He has no rales.   Abdominal: Soft. He " exhibits distension and ascites. There is no tenderness.   Musculoskeletal: He exhibits edema (3+ ptting to knee). He exhibits no tenderness.   Lymphadenopathy:     He has no cervical adenopathy.   Neurological: He is alert and oriented to person, place, and time.   Skin: Skin is warm and dry.   Psychiatric: He has a normal mood and affect. His behavior is normal.       Assessment:       1. Neuroendocrine carcinoma, unknown primary site    2. Post-surgical hypothyroidism    3. At risk for side effect of medication        Plan:       Ronnie was seen today for extremity weakness.    Diagnoses and all orders for this visit:    Will order PT to increase strength and encourage increased mobility. Will check lytes and TSH to ensure there is a not easily reversible cause superimposed on his malignancy.    Neuroendocrine carcinoma, unknown primary site  -     Ambulatory referral to Home Health  -     Ambulatory referral to Home Health    Post-surgical hypothyroidism  -     TSH; Future    At risk for side effect of medication  -     Comprehensive metabolic panel; Future                     Side effects of medication(s) were discussed in detail and patient voiced understanding.  Patient will call back for any issues or complications.

## 2019-03-01 ENCOUNTER — TELEPHONE (OUTPATIENT)
Dept: INTERNAL MEDICINE | Facility: CLINIC | Age: 64
End: 2019-03-01

## 2019-03-01 NOTE — TELEPHONE ENCOUNTER
Spoke to pt. Notified pt. That his home health orders were faxed to [facility] on 3/1/19   Jaime

## 2019-03-06 ENCOUNTER — TELEPHONE (OUTPATIENT)
Dept: INTERNAL MEDICINE | Facility: CLINIC | Age: 64
End: 2019-03-06

## 2019-03-06 NOTE — TELEPHONE ENCOUNTER
----- Message from Liza Bryson sent at 3/6/2019  9:47 AM CST -----  Contact: [facility] / [facility] / # [phone]  Name of Who is Calling: [employer] [place]      What is the request in detail:  Luis with [facility] is calling for further clarification on the home health orders received on Friday 03/01/2019 at this office.   Please give a call back at your earliest convenience.   THANKS!      Can the clinic reply by [facility]:  NO    Number to Call Back:  [employer] / [facility] / # [phone]

## 2019-03-10 RX ORDER — POTASSIUM CHLORIDE 1500 MG/1
TABLET, EXTENDED RELEASE ORAL
Qty: 180 TABLET | Refills: 2 | Status: SHIPPED | OUTPATIENT
Start: 2019-03-10

## 2019-03-12 ENCOUNTER — TELEPHONE (OUTPATIENT)
Dept: INTERNAL MEDICINE | Facility: CLINIC | Age: 64
End: 2019-03-12

## 2019-03-12 DIAGNOSIS — C7B.8 SECONDARY NEUROENDOCRINE TUMOR OF LIVER: ICD-10-CM

## 2019-03-12 DIAGNOSIS — C7A.8 PRIMARY MALIGNANT NEUROENDOCRINE TUMOR OF RECTUM: ICD-10-CM

## 2019-03-12 DIAGNOSIS — C79.51 SECONDARY MALIGNANT NEOPLASM OF BONE: Primary | ICD-10-CM

## 2019-03-12 NOTE — TELEPHONE ENCOUNTER
----- Message from Rachel Reed sent at 3/12/2019  2:52 PM CDT -----  Contact: [facility]  Name of Who is Calling: RASHAD ROBERT [[id number]]       What is the request in detail:pt needs a order for a bed side commode.. Please advise      Can the clinic reply by [facility]: no      What [facility]: [phone]

## 2019-03-15 ENCOUNTER — TELEPHONE (OUTPATIENT)
Dept: INTERNAL MEDICINE | Facility: CLINIC | Age: 64
End: 2019-03-15

## 2019-03-15 DIAGNOSIS — C7A.8 NEUROENDOCRINE CARCINOMA, UNKNOWN PRIMARY SITE: Primary | ICD-10-CM

## 2019-03-15 NOTE — TELEPHONE ENCOUNTER
----- Message from Nelly Sosa sent at 3/15/2019 11:54 AM CDT -----  Contact: Pt wife  Name of Who is Calling:Selene     What is the request in detail: Patient wife states the losartan (COZAAR) 100 MG tablet  Is on recall and wanted know what else will be prescribed Please contact to further discuss and advise    Can the clinic reply by [facility]: No    What [facility]: [phone]

## 2019-03-15 NOTE — TELEPHONE ENCOUNTER
----- Message from Blayne Sahni sent at 3/15/2019 11:07 AM CDT -----  Contact: RASHAD ROBERT [[id number]]  Name of Who is Calling: RASHAD ROBERT [[id number]]       What is the request in detail: Called regarding patient order for his walker. An urgent call back is requested from nurse. Please advise. thanks     Can the clinic reply by [facility]: No       What Number to Call Back if not in [facility]: [phone]

## 2019-03-15 NOTE — TELEPHONE ENCOUNTER
Sent message to wife and told her to call their pharmacy and ask if the cozaar that they have in stock is on the recall list

## 2019-03-19 ENCOUNTER — PATIENT MESSAGE (OUTPATIENT)
Dept: INTERNAL MEDICINE | Facility: CLINIC | Age: 64
End: 2019-03-19

## 2019-03-19 ENCOUNTER — TELEPHONE (OUTPATIENT)
Dept: INTERNAL MEDICINE | Facility: CLINIC | Age: 64
End: 2019-03-19

## 2019-03-19 NOTE — TELEPHONE ENCOUNTER
Pt.s wife notified that the pharmacist would like her to bring the old pills in, and if it's a part of the recall they will exchange it for the new losartan.   The patient's wife verbalized understanding and had no further questions or concerns.  Jaime

## 2019-03-19 NOTE — TELEPHONE ENCOUNTER
----- Message from Jacinda Preston sent at 3/19/2019 10:29 AM CDT -----  Contact: rock   Name of Who is Calling: rock       What is the request in detail: Per Mariella she states per the pharmacy the medication that her  is on was on the recall she is requesting a call back to discuss this matter         Can the clinic reply by [facility]: no      What Number to Call Back if not in [facility]: [phone]

## 2019-03-22 RX ORDER — FUROSEMIDE 80 MG/1
TABLET ORAL
Qty: 180 TABLET | Refills: 1 | Status: SHIPPED | OUTPATIENT
Start: 2019-03-22

## 2019-03-29 DIAGNOSIS — N18.9 CHRONIC KIDNEY DISEASE, UNSPECIFIED CKD STAGE: ICD-10-CM

## 2019-04-01 ENCOUNTER — TELEPHONE (OUTPATIENT)
Dept: INTERNAL MEDICINE | Facility: CLINIC | Age: 64
End: 2019-04-01

## 2019-04-01 NOTE — TELEPHONE ENCOUNTER
----- Message from Kenzie Pastor sent at 4/1/2019  9:33 AM CDT -----  Contact: Josey from [employer]   Name of Who is Calling: Josey from [employer]     What is the request in detail:Josey from [employer] is requesting orders for a commode to be faxed to [phone].... Please contact to further discuss and advise      Can the clinic reply by [facility]:     What [facility]: [phone].

## 2019-04-03 ENCOUNTER — PES CALL (OUTPATIENT)
Dept: ADMINISTRATIVE | Facility: CLINIC | Age: 64
End: 2019-04-03

## 2019-04-05 ENCOUNTER — PATIENT OUTREACH (OUTPATIENT)
Dept: ADMINISTRATIVE | Facility: HOSPITAL | Age: 64
End: 2019-04-05

## 2019-04-05 DIAGNOSIS — E78.2 MIXED HYPERLIPIDEMIA: Primary | ICD-10-CM

## 2019-04-08 ENCOUNTER — OFFICE VISIT (OUTPATIENT)
Dept: INTERNAL MEDICINE | Facility: CLINIC | Age: 64
End: 2019-04-08
Attending: INTERNAL MEDICINE
Payer: MEDICARE

## 2019-04-08 ENCOUNTER — LAB VISIT (OUTPATIENT)
Dept: LAB | Facility: OTHER | Age: 64
End: 2019-04-08
Attending: INTERNAL MEDICINE
Payer: MEDICARE

## 2019-04-08 ENCOUNTER — TELEPHONE (OUTPATIENT)
Dept: INTERNAL MEDICINE | Facility: CLINIC | Age: 64
End: 2019-04-08

## 2019-04-08 VITALS
DIASTOLIC BLOOD PRESSURE: 70 MMHG | BODY MASS INDEX: 26.25 KG/M2 | HEART RATE: 87 BPM | HEIGHT: 69 IN | SYSTOLIC BLOOD PRESSURE: 130 MMHG | WEIGHT: 177.25 LBS | OXYGEN SATURATION: 97 %

## 2019-04-08 DIAGNOSIS — C7B.8 SECONDARY NEUROENDOCRINE TUMOR OF LIVER: ICD-10-CM

## 2019-04-08 DIAGNOSIS — I50.32 CHRONIC DIASTOLIC HEART FAILURE: ICD-10-CM

## 2019-04-08 DIAGNOSIS — R20.0 ANESTHESIA OF SKIN: ICD-10-CM

## 2019-04-08 DIAGNOSIS — E78.2 MIXED HYPERLIPIDEMIA: ICD-10-CM

## 2019-04-08 DIAGNOSIS — C7A.8 PRIMARY MALIGNANT NEUROENDOCRINE TUMOR OF RECTUM: Primary | ICD-10-CM

## 2019-04-08 DIAGNOSIS — I82.412 ACUTE DEEP VEIN THROMBOSIS (DVT) OF FEMORAL VEIN OF LEFT LOWER EXTREMITY: ICD-10-CM

## 2019-04-08 DIAGNOSIS — D61.89 ANEMIA DUE TO OTHER BONE MARROW FAILURE: ICD-10-CM

## 2019-04-08 DIAGNOSIS — R29.898 LEG WEAKNESS, BILATERAL: ICD-10-CM

## 2019-04-08 DIAGNOSIS — R64 CACHEXIA: ICD-10-CM

## 2019-04-08 LAB
ALBUMIN SERPL BCP-MCNC: 3 G/DL (ref 3.5–5.2)
ALP SERPL-CCNC: 67 U/L (ref 55–135)
ALT SERPL W/O P-5'-P-CCNC: 14 U/L (ref 10–44)
ANION GAP SERPL CALC-SCNC: 11 MMOL/L (ref 8–16)
AST SERPL-CCNC: 18 U/L (ref 10–40)
BILIRUB SERPL-MCNC: 1.9 MG/DL (ref 0.1–1)
BUN SERPL-MCNC: 75 MG/DL (ref 8–23)
CALCIUM SERPL-MCNC: 9.7 MG/DL (ref 8.7–10.5)
CHLORIDE SERPL-SCNC: 105 MMOL/L (ref 95–110)
CHOLEST SERPL-MCNC: 119 MG/DL (ref 120–199)
CHOLEST/HDLC SERPL: 5.4 {RATIO} (ref 2–5)
CO2 SERPL-SCNC: 28 MMOL/L (ref 23–29)
CREAT SERPL-MCNC: 2.7 MG/DL (ref 0.5–1.4)
EST. GFR  (AFRICAN AMERICAN): 27 ML/MIN/1.73 M^2
EST. GFR  (NON AFRICAN AMERICAN): 24 ML/MIN/1.73 M^2
GLUCOSE SERPL-MCNC: 83 MG/DL (ref 70–110)
HDLC SERPL-MCNC: 22 MG/DL (ref 40–75)
HDLC SERPL: 18.5 % (ref 20–50)
LDLC SERPL CALC-MCNC: 79.8 MG/DL (ref 63–159)
NONHDLC SERPL-MCNC: 97 MG/DL
POTASSIUM SERPL-SCNC: 3.9 MMOL/L (ref 3.5–5.1)
PROT SERPL-MCNC: 6.1 G/DL (ref 6–8.4)
SODIUM SERPL-SCNC: 144 MMOL/L (ref 136–145)
TRIGL SERPL-MCNC: 86 MG/DL (ref 30–150)
TSH SERPL DL<=0.005 MIU/L-ACNC: 2.71 UIU/ML (ref 0.4–4)

## 2019-04-08 PROCEDURE — 99999 PR PBB SHADOW E&M-EST. PATIENT-LVL III: ICD-10-PCS | Mod: PBBFAC,,, | Performed by: INTERNAL MEDICINE

## 2019-04-08 PROCEDURE — 3008F BODY MASS INDEX DOCD: CPT | Mod: CPTII,S$GLB,, | Performed by: INTERNAL MEDICINE

## 2019-04-08 PROCEDURE — 99214 OFFICE O/P EST MOD 30 MIN: CPT | Mod: S$GLB,,, | Performed by: INTERNAL MEDICINE

## 2019-04-08 PROCEDURE — 80053 COMPREHEN METABOLIC PANEL: CPT

## 2019-04-08 PROCEDURE — 3075F SYST BP GE 130 - 139MM HG: CPT | Mod: CPTII,S$GLB,, | Performed by: INTERNAL MEDICINE

## 2019-04-08 PROCEDURE — 3078F DIAST BP <80 MM HG: CPT | Mod: CPTII,S$GLB,, | Performed by: INTERNAL MEDICINE

## 2019-04-08 PROCEDURE — 84443 ASSAY THYROID STIM HORMONE: CPT

## 2019-04-08 PROCEDURE — 3075F PR MOST RECENT SYSTOLIC BLOOD PRESS GE 130-139MM HG: ICD-10-PCS | Mod: CPTII,S$GLB,, | Performed by: INTERNAL MEDICINE

## 2019-04-08 PROCEDURE — 36415 COLL VENOUS BLD VENIPUNCTURE: CPT

## 2019-04-08 PROCEDURE — 1101F PR PT FALLS ASSESS DOC 0-1 FALLS W/OUT INJ PAST YR: ICD-10-PCS | Mod: CPTII,S$GLB,, | Performed by: INTERNAL MEDICINE

## 2019-04-08 PROCEDURE — 3008F PR BODY MASS INDEX (BMI) DOCUMENTED: ICD-10-PCS | Mod: CPTII,S$GLB,, | Performed by: INTERNAL MEDICINE

## 2019-04-08 PROCEDURE — 1101F PT FALLS ASSESS-DOCD LE1/YR: CPT | Mod: CPTII,S$GLB,, | Performed by: INTERNAL MEDICINE

## 2019-04-08 PROCEDURE — 3078F PR MOST RECENT DIASTOLIC BLOOD PRESSURE < 80 MM HG: ICD-10-PCS | Mod: CPTII,S$GLB,, | Performed by: INTERNAL MEDICINE

## 2019-04-08 PROCEDURE — 99999 PR PBB SHADOW E&M-EST. PATIENT-LVL III: CPT | Mod: PBBFAC,,, | Performed by: INTERNAL MEDICINE

## 2019-04-08 PROCEDURE — 99214 PR OFFICE/OUTPT VISIT, EST, LEVL IV, 30-39 MIN: ICD-10-PCS | Mod: S$GLB,,, | Performed by: INTERNAL MEDICINE

## 2019-04-08 PROCEDURE — 80061 LIPID PANEL: CPT

## 2019-04-08 RX ORDER — TRAMADOL HYDROCHLORIDE 50 MG/1
50 TABLET ORAL EVERY 8 HOURS PRN
Qty: 60 TABLET | Refills: 0 | Status: SHIPPED | OUTPATIENT
Start: 2019-04-08

## 2019-04-09 NOTE — TELEPHONE ENCOUNTER
Please let pt know his kidneys look a little worse but overall electrolytes are normal, markers of nutrition are better, thyroid is normal.

## 2019-04-10 PROBLEM — R64 CACHEXIA: Status: ACTIVE | Noted: 2019-04-10

## 2019-04-10 PROBLEM — D61.9 ANEMIA DUE TO BONE MARROW FAILURE: Status: ACTIVE | Noted: 2019-04-10

## 2019-04-10 NOTE — PROGRESS NOTES
"Subjective:       Patient ID: Ronnie Sandoval is a 63 y.o. male.    Chief Complaint: Leg Pain (left) and Shoulder Pain (right)    Here for urgent visit    Continued pain of bilateral hip/pelvis. He is becoming less active. He states he has a lot of pain and stiffness with getting up but most limiting symptom is proximal weakness. No new b/b loss or groin numbness. He also reports a new tingling sensation of his left anterior leg. No increase in back pain. No new distal weakness. Appetite is great but unable to eat much. Gets full early.      Review of Systems    Objective:      Vitals:    04/08/19 1323   BP: 130/70   BP Location: Right arm   Patient Position: Sitting   BP Method: Medium (Manual)   Pulse: 87   SpO2: 97%   Weight: 80.4 kg (177 lb 4 oz)   Height: 5' 9" (1.753 m)      Physical Exam   Constitutional: He is oriented to person, place, and time. He appears well-developed and well-nourished. He does not have a sickly appearance. No distress.   HENT:   Head: Normocephalic and atraumatic.   Eyes: Conjunctivae and EOM are normal. Right eye exhibits no discharge. Left eye exhibits no discharge. No scleral icterus.   Pulmonary/Chest: Effort normal. No tachypnea. No respiratory distress. He has no decreased breath sounds.   Abdominal: Normal appearance. He exhibits distension.   Musculoskeletal: He exhibits edema (2+ pitting to mif thigh).   Neurological: He is alert and oriented to person, place, and time.   Skin: Skin is warm and dry. He is not diaphoretic.   Psychiatric: He has a normal mood and affect. His speech is normal.       Assessment:       1. Primary malignant neuroendocrine tumor of rectum    2. Leg weakness, bilateral    3. Anesthesia of skin     4. Secondary neuroendocrine tumor of liver    5. Cachexia    6. 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 Chronic diastolic heart failure    8. Acute deep vein thrombosis (DVT) of femoral vein of left lower extremity        Plan:       Ronnie was seen " today for leg pain and shoulder pain.    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                 Side effects of medication(s) were discussed in detail and patient voiced understanding.  Patient will call back for any issues or complications.

## 2019-04-12 ENCOUNTER — TELEPHONE (OUTPATIENT)
Dept: INTERNAL MEDICINE | Facility: CLINIC | Age: 64
End: 2019-04-12

## 2019-04-12 NOTE — TELEPHONE ENCOUNTER
----- Message from Kenzie Pastor sent at 4/12/2019  8:55 AM CDT -----  Contact: Selene(wife)  Name of Who is Calling: Selene(wife)    What is the request in detail: Selene(wife) is calling in regards to seeing a physical therptist on yesterday, patient is unable to lift his self up.... Please contact to further discuss and advise      Can the clinic reply by [facility]:     What Number to Call Back if not in [facility]: [phone]

## 2019-04-13 ENCOUNTER — PATIENT MESSAGE (OUTPATIENT)
Dept: INTERNAL MEDICINE | Facility: CLINIC | Age: 64
End: 2019-04-13

## 2019-04-15 ENCOUNTER — TELEPHONE (OUTPATIENT)
Dept: INTERNAL MEDICINE | Facility: CLINIC | Age: 64
End: 2019-04-15

## 2019-04-15 NOTE — TELEPHONE ENCOUNTER
----- Message from Jackie Garcia sent at 4/15/2019  9:58 AM CDT -----  Name of Who is Calling: Pts wife     What is the request in detail: Pts wife was advised by the physical therapist to consult with the Dr. Pt is not able to stand up and was not able to do the exercises the physical therapist was assisting him with.       Can the clinic reply by [facility]:   No       What Number to Call Back if not in [facility]:[phone]

## 2019-04-17 ENCOUNTER — TELEPHONE (OUTPATIENT)
Dept: INTERNAL MEDICINE | Facility: CLINIC | Age: 64
End: 2019-04-17

## 2019-04-17 RX ORDER — METOLAZONE 2.5 MG/1
2.5 TABLET ORAL DAILY
Qty: 30 TABLET | Refills: 0 | Status: SHIPPED | OUTPATIENT
Start: 2019-04-17 | End: 2019-04-26 | Stop reason: ALTCHOICE

## 2019-04-17 NOTE — TELEPHONE ENCOUNTER
----- Message from Aravadim Meyer sent at 4/17/2019  8:30 AM CDT -----  Contact: RASHAD ROBERT [[id number]]  Name of Who is Calling: RASHAD ROBERT [[id number]]      What is the request in detail: Patient's wife called she need a call from there office to advise about her 's leg swelling. Patient requested a call today please from Dr. Hoover.      Can the clinic reply by [facility]: yes      What [facility]: [phone]

## 2019-04-25 ENCOUNTER — HOSPITAL ENCOUNTER (OUTPATIENT)
Facility: OTHER | Age: 64
LOS: 1 days | Discharge: HOME | End: 2019-04-26
Attending: EMERGENCY MEDICINE | Admitting: HOSPITALIST
Payer: MEDICARE

## 2019-04-25 DIAGNOSIS — C7A.8 MALIGNANT NEUROENDOCRINE NEOPLASM: ICD-10-CM

## 2019-04-25 DIAGNOSIS — C7B.8 SECONDARY NEUROENDOCRINE TUMOR OF LIVER: ICD-10-CM

## 2019-04-25 DIAGNOSIS — R64 CACHEXIA: ICD-10-CM

## 2019-04-25 DIAGNOSIS — I50.32 CHRONIC DIASTOLIC CONGESTIVE HEART FAILURE: ICD-10-CM

## 2019-04-25 DIAGNOSIS — R60.0 LOWER EXTREMITY EDEMA: ICD-10-CM

## 2019-04-25 DIAGNOSIS — I50.9 CHF (CONGESTIVE HEART FAILURE): ICD-10-CM

## 2019-04-25 DIAGNOSIS — C7A.8 PRIMARY MALIGNANT NEUROENDOCRINE TUMOR OF RECTUM: ICD-10-CM

## 2019-04-25 DIAGNOSIS — E89.0 POST-SURGICAL HYPOTHYROIDISM: ICD-10-CM

## 2019-04-25 DIAGNOSIS — C7A.8 NEUROENDOCRINE CARCINOMA METASTATIC TO MULTIPLE SITES: Primary | ICD-10-CM

## 2019-04-25 DIAGNOSIS — R60.1 ANASARCA: ICD-10-CM

## 2019-04-25 DIAGNOSIS — D64.9 ANEMIA, UNSPECIFIED TYPE: ICD-10-CM

## 2019-04-25 DIAGNOSIS — I10 ESSENTIAL HYPERTENSION: ICD-10-CM

## 2019-04-25 DIAGNOSIS — D63.0 ANEMIA IN NEOPLASTIC DISEASE: ICD-10-CM

## 2019-04-25 DIAGNOSIS — N17.9 AKI (ACUTE KIDNEY INJURY): ICD-10-CM

## 2019-04-25 DIAGNOSIS — N18.30 STAGE 3 CHRONIC KIDNEY DISEASE: ICD-10-CM

## 2019-04-25 DIAGNOSIS — C7B.8 NEUROENDOCRINE CARCINOMA METASTATIC TO MULTIPLE SITES: Primary | ICD-10-CM

## 2019-04-25 DIAGNOSIS — R18.8 OTHER ASCITES: ICD-10-CM

## 2019-04-25 LAB
ALBUMIN SERPL BCP-MCNC: 2.9 G/DL (ref 3.5–5.2)
ALP SERPL-CCNC: 70 U/L (ref 55–135)
ALT SERPL W/O P-5'-P-CCNC: 11 U/L (ref 10–44)
AMMONIA PLAS-SCNC: 77 UMOL/L (ref 10–50)
ANION GAP SERPL CALC-SCNC: 16 MMOL/L (ref 8–16)
AST SERPL-CCNC: 16 U/L (ref 10–40)
BASOPHILS # BLD AUTO: 0.03 K/UL (ref 0–0.2)
BASOPHILS NFR BLD: 0.5 % (ref 0–1.9)
BILIRUB SERPL-MCNC: 2 MG/DL (ref 0.1–1)
BNP SERPL-MCNC: 1910 PG/ML (ref 0–99)
BUN SERPL-MCNC: 99 MG/DL (ref 8–23)
CALCIUM SERPL-MCNC: 10.6 MG/DL (ref 8.7–10.5)
CHLORIDE SERPL-SCNC: 100 MMOL/L (ref 95–110)
CO2 SERPL-SCNC: 27 MMOL/L (ref 23–29)
CREAT SERPL-MCNC: 3.1 MG/DL (ref 0.5–1.4)
DIFFERENTIAL METHOD: ABNORMAL
EOSINOPHIL # BLD AUTO: 0.1 K/UL (ref 0–0.5)
EOSINOPHIL NFR BLD: 1.9 % (ref 0–8)
ERYTHROCYTE [DISTWIDTH] IN BLOOD BY AUTOMATED COUNT: 14.5 % (ref 11.5–14.5)
EST. GFR  (AFRICAN AMERICAN): 23 ML/MIN/1.73 M^2
EST. GFR  (NON AFRICAN AMERICAN): 20 ML/MIN/1.73 M^2
GLUCOSE SERPL-MCNC: 101 MG/DL (ref 70–110)
HCT VFR BLD AUTO: 20.5 % (ref 40–54)
HGB BLD-MCNC: 6.2 G/DL (ref 14–18)
LACTATE SERPL-SCNC: 1.8 MMOL/L (ref 0.5–2.2)
LYMPHOCYTES # BLD AUTO: 1 K/UL (ref 1–4.8)
LYMPHOCYTES NFR BLD: 17.2 % (ref 18–48)
MCH RBC QN AUTO: 33.3 PG (ref 27–31)
MCHC RBC AUTO-ENTMCNC: 30.2 G/DL (ref 32–36)
MCV RBC AUTO: 110 FL (ref 82–98)
MONOCYTES # BLD AUTO: 0.5 K/UL (ref 0.3–1)
MONOCYTES NFR BLD: 7.8 % (ref 4–15)
NEUTROPHILS # BLD AUTO: 4.3 K/UL (ref 1.8–7.7)
NEUTROPHILS NFR BLD: 72.4 % (ref 38–73)
PLATELET # BLD AUTO: 149 K/UL (ref 150–350)
PMV BLD AUTO: 11.6 FL (ref 9.2–12.9)
POTASSIUM SERPL-SCNC: 3.5 MMOL/L (ref 3.5–5.1)
PROT SERPL-MCNC: 6.4 G/DL (ref 6–8.4)
RBC # BLD AUTO: 1.86 M/UL (ref 4.6–6.2)
SODIUM SERPL-SCNC: 143 MMOL/L (ref 136–145)
WBC # BLD AUTO: 5.92 K/UL (ref 3.9–12.7)

## 2019-04-25 PROCEDURE — 86920 COMPATIBILITY TEST SPIN: CPT

## 2019-04-25 PROCEDURE — G0378 HOSPITAL OBSERVATION PER HR: HCPCS

## 2019-04-25 PROCEDURE — 99285 EMERGENCY DEPT VISIT HI MDM: CPT | Mod: 25

## 2019-04-25 PROCEDURE — 83880 ASSAY OF NATRIURETIC PEPTIDE: CPT

## 2019-04-25 PROCEDURE — 83605 ASSAY OF LACTIC ACID: CPT

## 2019-04-25 PROCEDURE — 36415 COLL VENOUS BLD VENIPUNCTURE: CPT

## 2019-04-25 PROCEDURE — 86901 BLOOD TYPING SEROLOGIC RH(D): CPT

## 2019-04-25 PROCEDURE — 80053 COMPREHEN METABOLIC PANEL: CPT

## 2019-04-25 PROCEDURE — 85025 COMPLETE CBC W/AUTO DIFF WBC: CPT

## 2019-04-25 PROCEDURE — 82140 ASSAY OF AMMONIA: CPT

## 2019-04-25 RX ORDER — HYDROCODONE BITARTRATE AND ACETAMINOPHEN 500; 5 MG/1; MG/1
TABLET ORAL
Status: DISCONTINUED | OUTPATIENT
Start: 2019-04-26 | End: 2019-04-26 | Stop reason: HOSPADM

## 2019-04-26 VITALS
TEMPERATURE: 98 F | HEART RATE: 79 BPM | WEIGHT: 196.19 LBS | HEIGHT: 69 IN | RESPIRATION RATE: 16 BRPM | SYSTOLIC BLOOD PRESSURE: 116 MMHG | OXYGEN SATURATION: 98 % | DIASTOLIC BLOOD PRESSURE: 62 MMHG | BODY MASS INDEX: 29.06 KG/M2

## 2019-04-26 PROBLEM — C7B.8 NEUROENDOCRINE CARCINOMA METASTATIC TO MULTIPLE SITES: Status: ACTIVE | Noted: 2019-04-26

## 2019-04-26 PROBLEM — I50.9 ACUTE ON CHRONIC HEART FAILURE: Status: ACTIVE | Noted: 2019-04-26

## 2019-04-26 PROBLEM — C7A.8 MALIGNANT NEUROENDOCRINE NEOPLASM: Status: ACTIVE | Noted: 2019-04-26

## 2019-04-26 PROBLEM — R60.1 ANASARCA: Status: ACTIVE | Noted: 2019-04-26

## 2019-04-26 PROBLEM — C7A.8 NEUROENDOCRINE CARCINOMA METASTATIC TO MULTIPLE SITES: Status: ACTIVE | Noted: 2019-04-26

## 2019-04-26 LAB
ABO + RH BLD: NORMAL
ALBUMIN SERPL BCP-MCNC: 2.7 G/DL (ref 3.5–5.2)
ALP SERPL-CCNC: 63 U/L (ref 55–135)
ALT SERPL W/O P-5'-P-CCNC: 10 U/L (ref 10–44)
ANION GAP SERPL CALC-SCNC: 13 MMOL/L (ref 8–16)
AST SERPL-CCNC: 12 U/L (ref 10–40)
BACTERIA #/AREA URNS HPF: ABNORMAL /HPF
BASOPHILS # BLD AUTO: 0.05 K/UL (ref 0–0.2)
BASOPHILS NFR BLD: 1 % (ref 0–1.9)
BILIRUB SERPL-MCNC: 1.6 MG/DL (ref 0.1–1)
BILIRUB UR QL STRIP: NEGATIVE
BLD GP AB SCN CELLS X3 SERPL QL: NORMAL
BUN SERPL-MCNC: 100 MG/DL (ref 8–23)
CALCIUM SERPL-MCNC: 10.1 MG/DL (ref 8.7–10.5)
CHLORIDE SERPL-SCNC: 100 MMOL/L (ref 95–110)
CLARITY UR: CLEAR
CO2 SERPL-SCNC: 30 MMOL/L (ref 23–29)
COLOR UR: YELLOW
CREAT SERPL-MCNC: 3.1 MG/DL (ref 0.5–1.4)
DIFFERENTIAL METHOD: ABNORMAL
EOSINOPHIL # BLD AUTO: 0.2 K/UL (ref 0–0.5)
EOSINOPHIL NFR BLD: 3.2 % (ref 0–8)
ERYTHROCYTE [DISTWIDTH] IN BLOOD BY AUTOMATED COUNT: 14.7 % (ref 11.5–14.5)
EST. GFR  (AFRICAN AMERICAN): 23 ML/MIN/1.73 M^2
EST. GFR  (NON AFRICAN AMERICAN): 20 ML/MIN/1.73 M^2
GLUCOSE SERPL-MCNC: 100 MG/DL (ref 70–110)
GLUCOSE UR QL STRIP: NEGATIVE
HCT VFR BLD AUTO: 17.4 % (ref 40–54)
HGB BLD-MCNC: 5.2 G/DL (ref 14–18)
HGB UR QL STRIP: ABNORMAL
KETONES UR QL STRIP: NEGATIVE
LEUKOCYTE ESTERASE UR QL STRIP: ABNORMAL
LYMPHOCYTES # BLD AUTO: 1.1 K/UL (ref 1–4.8)
LYMPHOCYTES NFR BLD: 21.1 % (ref 18–48)
MAGNESIUM SERPL-MCNC: 2.4 MG/DL (ref 1.6–2.6)
MCH RBC QN AUTO: 32.7 PG (ref 27–31)
MCHC RBC AUTO-ENTMCNC: 29.9 G/DL (ref 32–36)
MCV RBC AUTO: 109 FL (ref 82–98)
MICROSCOPIC COMMENT: ABNORMAL
MONOCYTES # BLD AUTO: 0.3 K/UL (ref 0.3–1)
MONOCYTES NFR BLD: 6.4 % (ref 4–15)
NEUTROPHILS # BLD AUTO: 3.4 K/UL (ref 1.8–7.7)
NEUTROPHILS NFR BLD: 68.1 % (ref 38–73)
NITRITE UR QL STRIP: NEGATIVE
PH UR STRIP: 5 [PH] (ref 5–8)
PHOSPHATE SERPL-MCNC: 4.3 MG/DL (ref 2.7–4.5)
PLATELET # BLD AUTO: 128 K/UL (ref 150–350)
PMV BLD AUTO: 11.5 FL (ref 9.2–12.9)
POTASSIUM SERPL-SCNC: 3.3 MMOL/L (ref 3.5–5.1)
PROT SERPL-MCNC: 5.7 G/DL (ref 6–8.4)
PROT UR QL STRIP: NEGATIVE
RBC # BLD AUTO: 1.59 M/UL (ref 4.6–6.2)
RBC #/AREA URNS HPF: 5 /HPF (ref 0–4)
SODIUM SERPL-SCNC: 143 MMOL/L (ref 136–145)
SP GR UR STRIP: 1.01 (ref 1–1.03)
SQUAMOUS #/AREA URNS HPF: 6 /HPF
URN SPEC COLLECT METH UR: ABNORMAL
UROBILINOGEN UR STRIP-ACNC: NEGATIVE EU/DL
WBC # BLD AUTO: 4.98 K/UL (ref 3.9–12.7)
WBC #/AREA URNS HPF: 3 /HPF (ref 0–5)
WBC CLUMPS URNS QL MICRO: ABNORMAL
YEAST URNS QL MICRO: ABNORMAL

## 2019-04-26 PROCEDURE — G0378 HOSPITAL OBSERVATION PER HR: HCPCS

## 2019-04-26 PROCEDURE — P9021 RED BLOOD CELLS UNIT: HCPCS

## 2019-04-26 PROCEDURE — 83735 ASSAY OF MAGNESIUM: CPT

## 2019-04-26 PROCEDURE — 80053 COMPREHEN METABOLIC PANEL: CPT

## 2019-04-26 PROCEDURE — 99220 PR INITIAL OBSERVATION CARE,LEVL III: CPT | Mod: ,,, | Performed by: NURSE PRACTITIONER

## 2019-04-26 PROCEDURE — 63600175 PHARM REV CODE 636 W HCPCS: Performed by: NURSE PRACTITIONER

## 2019-04-26 PROCEDURE — 99217 PR OBSERVATION CARE DISCHARGE: ICD-10-PCS | Mod: ,,, | Performed by: NURSE PRACTITIONER

## 2019-04-26 PROCEDURE — 84100 ASSAY OF PHOSPHORUS: CPT

## 2019-04-26 PROCEDURE — 36415 COLL VENOUS BLD VENIPUNCTURE: CPT

## 2019-04-26 PROCEDURE — 25000003 PHARM REV CODE 250: Performed by: NURSE PRACTITIONER

## 2019-04-26 PROCEDURE — 99217 PR OBSERVATION CARE DISCHARGE: CPT | Mod: ,,, | Performed by: NURSE PRACTITIONER

## 2019-04-26 PROCEDURE — 99220 PR INITIAL OBSERVATION CARE,LEVL III: ICD-10-PCS | Mod: ,,, | Performed by: NURSE PRACTITIONER

## 2019-04-26 PROCEDURE — 96374 THER/PROPH/DIAG INJ IV PUSH: CPT | Mod: 59

## 2019-04-26 PROCEDURE — 81000 URINALYSIS NONAUTO W/SCOPE: CPT

## 2019-04-26 PROCEDURE — 85025 COMPLETE CBC W/AUTO DIFF WBC: CPT

## 2019-04-26 RX ORDER — ONDANSETRON 8 MG/1
8 TABLET, ORALLY DISINTEGRATING ORAL EVERY 8 HOURS PRN
Status: DISCONTINUED | OUTPATIENT
Start: 2019-04-26 | End: 2019-04-26 | Stop reason: HOSPADM

## 2019-04-26 RX ORDER — FUROSEMIDE 10 MG/ML
80 INJECTION INTRAMUSCULAR; INTRAVENOUS 2 TIMES DAILY
Status: DISCONTINUED | OUTPATIENT
Start: 2019-04-26 | End: 2019-04-26 | Stop reason: HOSPADM

## 2019-04-26 RX ORDER — LOSARTAN POTASSIUM 50 MG/1
100 TABLET ORAL DAILY
Status: DISCONTINUED | OUTPATIENT
Start: 2019-04-26 | End: 2019-04-26 | Stop reason: HOSPADM

## 2019-04-26 RX ORDER — FERROUS SULFATE 325(65) MG
325 TABLET, DELAYED RELEASE (ENTERIC COATED) ORAL 2 TIMES DAILY
Status: DISCONTINUED | OUTPATIENT
Start: 2019-04-26 | End: 2019-04-26 | Stop reason: HOSPADM

## 2019-04-26 RX ORDER — METOLAZONE 5 MG/1
5 TABLET ORAL DAILY
Status: DISCONTINUED | OUTPATIENT
Start: 2019-04-26 | End: 2019-04-26 | Stop reason: HOSPADM

## 2019-04-26 RX ORDER — FUROSEMIDE 10 MG/ML
40 INJECTION INTRAMUSCULAR; INTRAVENOUS ONCE
Status: COMPLETED | OUTPATIENT
Start: 2019-04-26 | End: 2019-04-26

## 2019-04-26 RX ORDER — METOLAZONE 2.5 MG/1
2.5 TABLET ORAL DAILY
Status: DISCONTINUED | OUTPATIENT
Start: 2019-04-26 | End: 2019-04-26

## 2019-04-26 RX ORDER — POTASSIUM CHLORIDE 20 MEQ/1
20 TABLET, EXTENDED RELEASE ORAL 2 TIMES DAILY
Status: DISCONTINUED | OUTPATIENT
Start: 2019-04-26 | End: 2019-04-26 | Stop reason: HOSPADM

## 2019-04-26 RX ORDER — ACETAMINOPHEN 325 MG/1
650 TABLET ORAL EVERY 4 HOURS PRN
Status: DISCONTINUED | OUTPATIENT
Start: 2019-04-26 | End: 2019-04-26 | Stop reason: HOSPADM

## 2019-04-26 RX ORDER — METOPROLOL SUCCINATE 50 MG/1
200 TABLET, EXTENDED RELEASE ORAL DAILY
Status: DISCONTINUED | OUTPATIENT
Start: 2019-04-26 | End: 2019-04-26 | Stop reason: HOSPADM

## 2019-04-26 RX ORDER — SODIUM CHLORIDE 0.9 % (FLUSH) 0.9 %
10 SYRINGE (ML) INJECTION
Status: DISCONTINUED | OUTPATIENT
Start: 2019-04-26 | End: 2019-04-26 | Stop reason: HOSPADM

## 2019-04-26 RX ORDER — SODIUM CHLORIDE 0.9 % (FLUSH) 0.9 %
10 SYRINGE (ML) INJECTION
Status: DISCONTINUED | OUTPATIENT
Start: 2019-04-26 | End: 2019-04-26

## 2019-04-26 RX ORDER — LEVOTHYROXINE SODIUM 75 UG/1
75 TABLET ORAL
Status: DISCONTINUED | OUTPATIENT
Start: 2019-04-26 | End: 2019-04-26 | Stop reason: HOSPADM

## 2019-04-26 RX ADMIN — LEVOTHYROXINE SODIUM 75 MCG: 75 TABLET ORAL at 06:04

## 2019-04-26 RX ADMIN — LOSARTAN POTASSIUM 100 MG: 50 TABLET, FILM COATED ORAL at 10:04

## 2019-04-26 RX ADMIN — METOPROLOL SUCCINATE 200 MG: 50 TABLET, EXTENDED RELEASE ORAL at 10:04

## 2019-04-26 RX ADMIN — FUROSEMIDE 40 MG: 10 INJECTION, SOLUTION INTRAVENOUS at 12:04

## 2019-04-26 RX ADMIN — METOLAZONE 5 MG: 5 TABLET ORAL at 10:04

## 2019-04-26 RX ADMIN — POTASSIUM CHLORIDE 20 MEQ: 1500 TABLET, EXTENDED RELEASE ORAL at 10:04

## 2019-04-26 RX ADMIN — FERROUS SULFATE TAB EC 325 MG (65 MG FE EQUIVALENT) 325 MG: 325 (65 FE) TABLET DELAYED RESPONSE at 10:04

## 2019-04-26 RX ADMIN — FUROSEMIDE 80 MG: 10 INJECTION, SOLUTION INTRAVENOUS at 12:04

## 2019-04-26 NOTE — CONSULTS
Consult Note  Palliative Care      Consult Requested By: Brenda Engel NP  Reason for Consult: Advance Directives    SUBJECTIVE:     History of Present Illness:  Chief Complaint   Patient presents with    Leg Swelling       reports new onset increased swelling with weakness, abd distention noted with confusion      This is a 63 y.o. male, history of malignant carcinoid tumor of the rectum, secondary neuroendocrine tumor of liver, and secondary malignant neoplasm of bone, who presents with complaint of bilateral leg weakness starting this morning.  His wife also reports subjective fever last night.  He also experienced SOB when laying flat.  His wife also reports some confusion when asked who the president was by intake nurse upon arrival to ED.  He reports using walker at baseline, and ongoing bilateral low extremity swelling for one year.  He is currently in physical therapy for leg weakness.     Past Medical History:   Diagnosis Date    Acute deep vein thrombosis (DVT) of femoral vein of left lower extremity 10/2/2017    Anemia     Anemia in neoplastic disease 10/2/2017    Chemotherapy follow-up examination 5/26/2015    Colon polyps     Encounter for blood transfusion     Hx antineoplastic chemotherapy 12/2014-1/2015     16 + Cisplatin    Hyperlipidemia     Hypertension     Increased bilirubin level 10/2/2017    Malignant carcinoid tumor of rectum 5/26/2015    Malignant carcinoid tumor of the rectum     Neuroendocrine carcinoma, unknown primary site 4/14/2015    Papillary thyroid carcinoma 4/14/2015    Secondary malignant neoplasm of bone 4/14/2015    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    left groin mass; Ki 67-20%    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    Secondary neuroendocrine tumor of liver(209.72) 11/2014    Stage 3 chronic kidney disease 10/2/2017    Thrombocytopenia 10/2/2017    Urinary tract infection      Past Surgical History:   Procedure  Laterality Date    COLONOSCOPY      excision of mass      left mastoid    EXCISION-MASS Left 8/5/2013    [facility]    INSERTION-FILTER-INFERIOR VENA CAVA N/A 10/23/2017    [facility]ND [facility]    LIVER BIOPSY  11/14    Mass in grion excised  8/2013    Carcinoid    Mass in groin excised  6/2013    Carcinoid    THYROIDECTOMY  2014    [place] - Carcinoid    TRANSPROSTATIC TISSUE RETRACTION N/A 9/5/2017    Performed by Rafael Holland MD at [facility] OR     Family History   Problem Relation Age of Onset    Leukemia Father     Cancer Father         leukemia    Stroke Sister     Cancer Brother         colon    Colon cancer Neg Hx     Esophageal cancer Neg Hx      Social History     Tobacco Use    Smoking status: Never Smoker    Smokeless tobacco: Never Used    Tobacco comment: Quit in 1979   Substance Use Topics    Alcohol use: No    Drug use: No       Mental Status: Oriented x 2.    ECOG Performance Status Grade: 2 - Ambulates, capable of self care only per wife    Review of Systems:  Constitutional: Positive for anasarca, cachetic limbs.  Eyes: Positive for PERRL without accomodation.  Respiratory: Positive for labored breathing with accessory muscle usage.  Cardiovascular: Negative for chest pain. Positive for BLE pitting edema.  Gastrointestinal: Negative for constipation, diarrhea, nausea or vomiting. Unable to assess BS due to US in progress.  Genitourinary: Positive urine output per wife. She states Pt has large volume of clear to light yellow urine as observed in toilet, BS urinal or pull-ups..   Musculoskeletal: Positive for lower back pain, exacerbated by prolonged sitting or standing.   Neurological: Positive for weakness, especially BLE. Pt uses personal walker for ambulation.  Behavioral/Psych: Negative for agitation or behavior disturbance.    OBJECTIVE:     Pain Assessment: No pain reported at this time. Wife reports  "Pt has occasional lower back pain exacerbated by sitting or standing. Tramadol has been prescribed, but she doesn't like to give to her  because she worries about addiction.    Decision-Making Capacity: Pt is able to simply state wishes. Wife does confer with , but complex medical discussions are held with wife.    Advanced Directives:  Living Will: No  Do Not Resuscitate Status: Full  Medical Power of : Yes, wife states they have filled out paperwork to designate each other as MPOA. Copy not provided.  Registered Organ Donor: unknown    Living Arrangements: Lives with wife at [address] [place] [address]. Home: [phone]. Cell:[phone].     Psychosocial, Spiritual, Cultural:  Patient's most important priorities:  1. Be at home, not a facility per wife    Patient's biggest concerns/fears:  Unable to assess    Previous death/end of life care history:  Unable to assess    Patient's goals/hopes:  1. Be comfortable per wife  2. Be at home per wife    ASSESSMENT/PLAN:   Pt lying supine in bed. Selene (wife)  at BS.  tech at  assessing ABD. Palliative Care service introduction. Brochure and business card given to wife Selene. Pt oriented to person and place, confusion noted regarding time and situation.  Wife reports they live together at their home in [place] and have been  47 years. They have 2 sons that live out-of-town. 1 son lives in [place] and 1 son lives in [place]. She is the primary caregiver. She states they have family and friends who will sive support for caring for her  and allow her to attend to things away from the home. Wife reports  was walking and engaged with life until last Friday 4/19/19. She states pt started to have weakness, especially in BLE. She reports she tried to sit  up in bed and he "rolled back". She denies loss of consciousness, but  was unable to help himself sit up or adjust position in bed. This " "was an acute change that has not happened before.     Wife reports Pt has lower back pain that is exacerbated by prolonged sitting or standing. He was prescribed Tramadol by PCP, but Selene doesn't like to administer due to addiction concerns. She states he doesn't complain of pain often and will usually do well with Tylenol OTC. She denies constipation. She states Pt has regular BM 2 to 3 x /day. Selene states Pt voids a large amount of clear to light yellow urine throughout day and night. She states urine used to be dark te in color but has lighten since starting diuretic BID. Pt is occasionally incontinent of urine, requiring pull-ups. Pt does urinate in BS urinal or toilet if able.     Wife denies  has depression or anxiety. She does report chronic insomnia related to previous employment of being a . Wife denies SOB. Pt does not require home O2. 98% to 100% O2 saturation on RA noted in chart.  Pt observed to be slightly labored with accessory muscle usage while breathing. 28/BPM counted.    Selene states 's baseline weight is 240 lbs. His weight had decreased to 172 lbs s/p chemo. Last noted weight was 195 lbs taken with home scale 3 weeks ago. Wife states she hasn't noticed an increase or decrease in ABD girth. Unable to take measurement due to US being performed. She reports Pt still has a good appetite. He eats 100% of meals at least 4 x /day with several snacks through out 24 hour period. Selene reports  had stopped chemo 5 years ago due to side effects. They felt that chemo treatment was more detrimental than cancer itself. Selene states that "everyone we know who has had chemotherapy has . He stopped chemo and he is still alive and here with us".    Wife states they both completed MPOA previously, designating each other. Discussed advance directives. Wife states she wants  to have full measures at this time. She states they are very active and are "out " "and about at least 3x /week". Discussed Hospice services as a means to lend support to wife and  at home. Wife is amendable to [facility] providing Hospice services. They have provided HH services to  and wife was pleased with them. Antolin in CM contacted via secure chat and asked to coordinate before discharge. Brenda Engel NP updated on consult. Wife encouraged to contact PC for questions or concerns.    Thank you for allowing Palliative Care to participate in Mr. Sandoval's care.    Barbi Dorado  Palliative Care Nurse  [facility]    Recommendations:  1. [facility] referral per wife's wishes    2. [facility] referral for support services available    Time Spent: 20 minutes reviewing chart, 10 minute pre-consultation update from NP, 50 minute BS assessment and family conference with wife and , 10 minute post- consultation update with NP, 20 minutes to chart    "

## 2019-04-26 NOTE — ASSESSMENT & PLAN NOTE
Creatinine- 3.1, baseline appears to be 2 to 2.7 Believe due to congestion, will give Lasix IV and CMP in AM

## 2019-04-26 NOTE — PLAN OF CARE
LMSW met with patient at the bedside. Patients wife Selene is at the bedside and completed the discharge planning assessment.     Patient is alert and verbal but is not oriented.      Prior to admission patient was taken care of by Selene. HH signed off a week ago. Family is agreeable to Hospice with [facility].     Patients PCP is correct on the face sheet. Patient choice pharmacy is [facility] on [address].      Patients family will transport the patient home pending discharge.      CM team will continue to follow.       04/26/19 9014   Discharge Assessment   Assessment Type Discharge Planning Assessment   Confirmed/corrected address and phone number on facesheet? Yes   Assessment information obtained from? Patient;Caregiver   Communicated expected length of stay with patient/caregiver no   Prior to hospitilization cognitive status: Unable to Assess   Prior to hospitalization functional status: Assistive Equipment   Current cognitive status: Unable to Assess   Current Functional Status: Assistive Equipment   Lives With spouse   Able to Return to Prior Arrangements yes   Is patient able to care for self after discharge? Yes   Patient's perception of discharge disposition hospice/home   Patient currently being followed by outpatient case management? No   Patient currently receives any other outside agency services? No   Equipment Currently Used at Home rollator   Do you have any problems affording any of your prescribed medications? No   Is the patient taking medications as prescribed? yes   Does the patient have transportation home? Yes   Transportation Anticipated family or friend will provide   Does the patient receive services at the [facility]madin Clinic? No   Discharge Plan A Home with family;Hospice/home   Patient/Family in Agreement with Plan yes

## 2019-04-26 NOTE — ASSESSMENT & PLAN NOTE
H/H- 6.2/20.5. Baseline 7.1-7.3/ 21-22. Believe partly dilutional    Lasix IV ordered  Will hold off on transfusion until response to Lasix.  CBC daily

## 2019-04-26 NOTE — PLAN OF CARE
Spoke to Y[facility] at [facility]. She has not received the Right Care Referral. Faxed directly to Yuni at [phone]. Await return call for acceptance.

## 2019-04-26 NOTE — PLAN OF CARE
Problem: Adult Inpatient Plan of Care  Goal: Plan of Care Review  Outcome: Ongoing (interventions implemented as appropriate)  Pt's V/S were monitored throughout the shift. The pt used urinal for urination. The pt remained free from falls and trauma. The pt's wife remained at the pt's bedside. The pt denied any SOB, pain or discomfort. The pt is receiving 1unit of RBC and tolerating well. Plan of care was reviewed with the pt and wife, both verbalized understanding. Purposeful rounding was performed.The pt's bed is in the lowest position with the bed wheels locked. Call light within reach. NAD noted. Will continue to monitor

## 2019-04-26 NOTE — ED TRIAGE NOTES
"Pt wife at bedside reports pt w/ worsening abdominal distention, bilateral leg swelling, and weakness. Pt wife states she scheduled an appointment w/ PCP, but weakness w/ confusion worsened today, states, "I decided to bring him in today cause they will most likelt just send us to the ER tomorrow." Pt is AAO x 2, unable to state the day of week, oriented to self and situation.  "

## 2019-04-26 NOTE — PLAN OF CARE
04/26/19 1716   Final Note   Assessment Type Final Discharge Note   Anticipated Discharge Disposition HospiceHome   Right Care Referral Info   Referral Type   ([facility])

## 2019-04-26 NOTE — ED PROVIDER NOTES
Encounter Date: 4/25/2019    SCRIBE #1 NOTE: I, Carol Neal, am scribing for, and in the presence of, Dr. Maciel.       History     Chief Complaint   Patient presents with    Leg Swelling     reports new onset increased swelling with weakness, abd distention noted with confusion      Time seen by provider: 9:56 PM    This is a 63 y.o. male, history of malignant carcinoid tumor of the rectum, secondary neuroendocrine tumor of liver, and secondary malignant neoplasm of bone, who presents with complaint of bilateral leg weakness starting this morning.  His wife also reports subjective fever last night.  He also experienced SOB when laying flat.  His wife also reports some confusion when asked who the president was by intake nurse upon arrival to ED.  He reports using walker at baseline, and ongoing bilateral low extremity swelling for one year.  He is currently in physical therapy for leg weakness.    The history is provided by the patient, the spouse and medical records.     Review of patient's allergies indicates:  No Known Allergies  Past Medical History:   Diagnosis Date    Acute deep vein thrombosis (DVT) of femoral vein of left lower extremity 10/2/2017    Anemia     Anemia in neoplastic disease 10/2/2017    Chemotherapy follow-up examination 5/26/2015    Colon polyps     Encounter for blood transfusion     Hx antineoplastic chemotherapy 12/2014-1/2015     16 + Cisplatin    Hyperlipidemia     Hypertension     Increased bilirubin level 10/2/2017    Malignant carcinoid tumor of rectum 5/26/2015    Malignant carcinoid tumor of the rectum     Neuroendocrine carcinoma, unknown primary site 4/14/2015    Papillary thyroid carcinoma 4/14/2015    Secondary malignant neoplasm of bone 4/14/2015    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    left groin mass; Ki 67-20%    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    Secondary neuroendocrine tumor of liver(209.72) 11/2014     Stage 3 chronic kidney disease 10/2/2017    Thrombocytopenia 10/2/2017    Urinary tract infection      Past Surgical History:   Procedure Laterality Date    COLONOSCOPY      excision of mass      left mastoid    EXCISION-MASS Left 8/5/2013    Performed by Otilio Ramirez MD at [facility], [place], operated by [facility] OR    INSERTION-FILTER-INFERIOR VENA CAVA N/A 10/23/2017    Performed by [facility] Provider at [facility] OR 2ND FLR    LIVER BIOPSY  11/14    Mass in grion excised  8/2013    Carcinoid    Mass in groin excised  6/2013    Carcinoid    THYROIDECTOMY  2014    [place] - Carcinoid    TRANSPROSTATIC TISSUE RETRACTION N/A 9/5/2017    Performed by Rafael Holland MD at [facility], [place], operated by [facility] OR     Family History   Problem Relation Age of Onset    Leukemia Father     Cancer Father         leukemia    Stroke Sister     Cancer Brother         colon    Colon cancer Neg Hx     Esophageal cancer Neg Hx      Social History     Tobacco Use    Smoking status: Never Smoker    Smokeless tobacco: Never Used    Tobacco comment: Quit in 1979   Substance Use Topics    Alcohol use: No    Drug use: No     Review of Systems   Constitutional: Positive for fever (Subjective). Negative for chills.   HENT: Negative for congestion and rhinorrhea.    Respiratory: Positive for shortness of breath. Negative for cough.    Cardiovascular: Positive for leg swelling (Chronic). Negative for chest pain.   Gastrointestinal: Negative for abdominal pain, diarrhea, nausea and vomiting.   Genitourinary: Negative for decreased urine volume, difficulty urinating, dysuria, enuresis, frequency, hematuria and urgency.   Musculoskeletal: Negative for back pain and neck pain.   Skin: Negative for color change, pallor and rash.   Neurological: Positive for weakness (Bilateral legs). Negative for dizziness and headaches.   Hematological: Negative for adenopathy. Does not bruise/bleed easily.   Psychiatric/Behavioral: Positive for confusion.       Physical Exam     Initial  Vitals [04/25/19 2106]   BP Pulse Resp Temp SpO2   129/68 78 20 98.1 °F (36.7 °C) 98 %      MAP       --         Physical Exam    Nursing note and vitals reviewed.  Constitutional: He is not diaphoretic. No distress.   Chronically ill-appearing.   HENT:   Head: Normocephalic and atraumatic.   Eyes: Conjunctivae and EOM are normal. Pupils are equal, round, and reactive to light. No scleral icterus.   Neck: Normal range of motion. Neck supple.   Cardiovascular: Regular rhythm. Exam reveals no gallop and no friction rub.    Murmur (Slight) heard.  Regular heart rate with intermittent PVCs.   Pulmonary/Chest: No respiratory distress. He has no wheezes. He has no rhonchi. He has no rales.   Abdominal: Bowel sounds are normal. He exhibits distension and mass. There is no tenderness. There is no rebound and no guarding.   Large distended abdomen with palpable mass in midabdomen and RUQ. No fluid wave.   Musculoskeletal: Normal range of motion. He exhibits edema. He exhibits no tenderness.   3+ pitting edema up to the thighs bilaterally.   Lymphadenopathy:     He has no cervical adenopathy.   Neurological: He is alert and oriented to person, place, and time. He has normal reflexes. He displays normal reflexes. No cranial nerve deficit or sensory deficit.   Equal  strength. Limited ability to lift legs bilaterally.   Skin: Skin is warm and dry. No rash noted. No erythema. No pallor.   Psychiatric: He has a normal mood and affect. His behavior is normal. Judgment and thought content normal.         ED Course   Procedures  Labs Reviewed   CBC W/ AUTO DIFFERENTIAL - Abnormal; Notable for the following components:       Result Value    RBC 1.86 (*)     Hemoglobin 6.2 (*)     Hematocrit 20.5 (*)      (*)     MCH 33.3 (*)     MCHC 30.2 (*)     Platelets 149 (*)     Lymph% 17.2 (*)     All other components within normal limits   COMPREHENSIVE METABOLIC PANEL - Abnormal; Notable for the following components:    BUN, Bld  99 (*)     Creatinine 3.1 (*)     Calcium 10.6 (*)     Albumin 2.9 (*)     Total Bilirubin 2.0 (*)     eGFR if  23 (*)     eGFR if non  20 (*)     All other components within normal limits   AMMONIA - Abnormal; Notable for the following components:    Ammonia 77 (*)     All other components within normal limits   B-TYPE NATRIURETIC PEPTIDE - Abnormal; Notable for the following components:    BNP 1,910 (*)     All other components within normal limits   LACTIC ACID, PLASMA   B-TYPE NATRIURETIC PEPTIDE   URINALYSIS, REFLEX TO URINE CULTURE   TYPE & SCREEN   PREPARE RBC SOFT          Imaging Results    None          Medical Decision Making:   History:   Old Records Summarized: records from previous admission(s).       <> Summary of Records: Last hospital admission was September 2018. 2 units of blood transfused.  Clinical Tests:   Lab Tests: Reviewed and Ordered  ED Management:  63-year-old male presents with weakness bilaterally. Patient does have home health services as he has been weak for a long time but is typically able to ambulate with a walker.  He has had her worsening swelling in his legs and today was unable to even stand on his own or ambulate.  No fever noted. The wife mentioned a little bit of confusion today but was not clear to specific as to what this was.  Differential would include hyper am anemia, a KI, other metabolic derangements.  His weakness could be secondary to the significant amount of fluid that is 3rd spaced into his lower extremities bilaterally.  Vital signs are normal.  Will get labs and observed.    10:15 p.m. patient is a hemoglobin of 6.2.  Looking at his medical records his baseline is approximately 7.1.    Patient's creatinine is increased from his baseline of 2.7 now 3.1.  I do feel the patient needs to be admitted for blood transfusion as well as diuresis.  Discussed this with the patient as well as his wife was at bedside.  They agreed the plan of  care.  Spoke with Hospital Medicine who will admit for further evaluation management.    This is the extent to the patients complaints today here in the emergency department.            Scribe Attestation:   Scribe #1: I performed the above scribed service and the documentation accurately describes the services I performed. I attest to the accuracy of the note.    Attending Attestation:           Physician Attestation for Scribe:  Physician Attestation Statement for Scribe #1: I, Dr. Maciel, reviewed documentation, as scribed by Carol Neal in my presence, and it is both accurate and complete.                 ED Course as of Apr 26 0038   Thu Apr 25, 2019   2322 Case discussed with Jeff Haywood, BENI-C. Admit to Dr. Hodges.    [HR]      ED Course User Index  [HR] Carol Neal     Clinical Impression:     1. Anemia, unspecified type    2. NICOLE (acute kidney injury)    3. Lower extremity edema                                   Rod Maciel, DO  04/26/19 0038

## 2019-04-26 NOTE — HPI
The patient is a 63 y.o. male, history of malignant carcinoid tumor of the rectum, secondary neuroendocrine tumor of liver, and secondary malignant neoplasm of bone, who presents with complaint of bilateral leg weakness starting this morning.  His wife also reports subjective fever last night.  He also experienced SOB when laying flat.  His wife also reports some confusion when asked who the president was by intake nurse upon arrival to ED.  He reports using walker at baseline, and ongoing bilateral low extremity swelling for one year.  He is currently in physical therapy for leg weakness.

## 2019-04-26 NOTE — PLAN OF CARE
Referral sent to [facility]". Handoff to evening SW .     04/26/19 0229   Post-Acute Status   Post-Acute Authorization Home Health/Hospice   Post-Acute Placement Status Referrals Sent

## 2019-04-26 NOTE — NURSING
Pt was AAOx4, NAD noted. Vitals monitored and stable. Blood transfusion completed during shift, no adverse reactions noted. Ultrasound done at bedside during shift. IV removed per order, cath intact. Discharge instructions reviewed with pt and pt's wife. Pt was transported off of the unit via wheelchair.

## 2019-04-26 NOTE — SUBJECTIVE & OBJECTIVE
Past Medical History:   Diagnosis Date    Acute deep vein thrombosis (DVT) of femoral vein of left lower extremity 10/2/2017    Anemia     Anemia in neoplastic disease 10/2/2017    Chemotherapy follow-up examination 5/26/2015    Colon polyps     Encounter for blood transfusion     Hx antineoplastic chemotherapy 12/2014-1/2015     16 + Cisplatin    Hyperlipidemia     Hypertension     Increased bilirubin level 10/2/2017    Malignant carcinoid tumor of rectum 5/26/2015    Malignant carcinoid tumor of the rectum     Neuroendocrine carcinoma, unknown primary site 4/14/2015    Papillary thyroid carcinoma 4/14/2015    Secondary malignant neoplasm of bone 4/14/2015    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    left groin mass; Ki 67-20%    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    Secondary neuroendocrine tumor of liver(209.72) 11/2014    Stage 3 chronic kidney disease 10/2/2017    Thrombocytopenia 10/2/2017    Urinary tract infection        Past Surgical History:   Procedure Laterality Date    COLONOSCOPY      excision of mass      left mastoid    EXCISION-MASS Left 8/5/2013    Performed by Otilio Ramirez MD at [facility], [place], operated by [facility] OR    INSERTION-FILTER-INFERIOR VENA CAVA N/A 10/23/2017    Performed by [facility] Provider at [facility] 2ND FLR    LIVER BIOPSY  11/14    Mass in grion excised  8/2013    Carcinoid    Mass in groin excised  6/2013    Carcinoid    THYROIDECTOMY  2014    [place] - Carcinoid    TRANSPROSTATIC TISSUE RETRACTION N/A 9/5/2017    Performed by Rafael Holland MD at [facility], [place], operated by [facility] OR       Review of patient's allergies indicates:  No Known Allergies    No current facility-administered medications on file prior to encounter.      Current Outpatient Medications on File Prior to Encounter   Medication Sig    cholecalciferol, vitamin D3, 50,000 unit capsule Take 1 capsule (50,000 Units total) by mouth once a week.    ferrous sulfate 325 mg (65 mg iron) Tab  tablet Take 325 mg by mouth 2 (two) times daily.    furosemide (LASIX) 80 MG tablet TAKE 1 & 1/2 (ONE & ONE-HALF) TABLETS BY MOUTH TWICE DAILY    KLOR-CON M20 20 mEq tablet TAKE 1 TABLET BY MOUTH TWICE DAILY    levothyroxine (SYNTHROID) 75 MCG tablet TAKE ONE TABLET BY MOUTH ONCE DAILY BEFORE BREAKFAST    losartan (COZAAR) 100 MG tablet TAKE ONE TABLET BY MOUTH ONCE DAILY    metoprolol succinate (TOPROL-XL) 200 MG 24 hr tablet TAKE ONE TABLET BY MOUTH ONCE DAILY    multivit-min/FA/lycopen/lutein (CENTRUM SILVER MEN ORAL) Take 1 tablet by mouth once daily.    traMADol (ULTRAM) 50 mg tablet Take 1 tablet (50 mg total) by mouth every 8 (eight) hours as needed for Pain.    [DISCONTINUED] metOLazone (ZAROXOLYN) 2.5 MG tablet Take 1 tablet (2.5 mg total) by mouth once daily. Take 30 min prior to lasix dose for 3-5 days.     Family History     Problem Relation (Age of Onset)    Cancer Father, Brother    Leukemia Father    Stroke Sister        Tobacco Use    Smoking status: Never Smoker    Smokeless tobacco: Never Used    Tobacco comment: Quit in 1979   Substance and Sexual Activity    Alcohol use: No    Drug use: No    Sexual activity: Not on file     Review of Systems   Constitutional: Positive for activity change and fatigue. Negative for appetite change and fever.   HENT: Negative for congestion, ear pain and postnasal drip.    Eyes: Negative for discharge.   Respiratory: Positive for shortness of breath. Negative for apnea and wheezing.    Cardiovascular: Positive for leg swelling. Negative for chest pain.   Gastrointestinal: Positive for abdominal distention. Negative for abdominal pain, nausea and vomiting.   Endocrine: Negative for polydipsia, polyphagia and polyuria.   Genitourinary: Negative for difficulty urinating, flank pain, frequency, hematuria and urgency.   Musculoskeletal: Positive for arthralgias and myalgias. Negative for joint swelling.   Skin: Negative for pallor and rash.    Allergic/Immunologic: Negative for environmental allergies and food allergies.   Neurological: Negative for dizziness, speech difficulty, weakness, light-headedness and headaches.   Hematological: Does not bruise/bleed easily.   Psychiatric/Behavioral: Negative for agitation.     Objective:     Vital Signs (Most Recent):  Temp: 98.4 °F (36.9 °C) (04/26/19 0105)  Pulse: 81 (04/26/19 0105)  Resp: 16 (04/26/19 0105)  BP: 122/65 (04/26/19 0105)  SpO2: 98 % (04/26/19 0105) Vital Signs (24h Range):  Temp:  [98.1 °F (36.7 °C)-98.4 °F (36.9 °C)] 98.4 °F (36.9 °C)  Pulse:  [78-86] 81  Resp:  [13-20] 16  SpO2:  [95 %-98 %] 98 %  BP: (122-132)/(59-68) 122/65     Weight: 88.5 kg (195 lb)  Body mass index is 28.8 kg/m².    Physical Exam   Constitutional: He appears well-developed. He appears lethargic. He appears cachectic. He appears ill.   HENT:   Head: Normocephalic.   Eyes: Conjunctivae are normal.   Neck: Normal range of motion. Neck supple.   Cardiovascular: Normal rate and normal heart sounds. An irregularly irregular rhythm present.   Significant 3-4+ pitting edema bilateral lower extremities into abdomen   Pulmonary/Chest: Effort normal. He has decreased breath sounds in the right middle field, the right lower field, the left middle field and the left lower field.   Abdominal: Soft. He exhibits distension and ascites. Bowel sounds are increased. There is no tenderness.   Musculoskeletal: Normal range of motion.   Neurological: He appears lethargic. GCS eye subscore is 4. GCS verbal subscore is 4. GCS motor subscore is 6.   Skin: Skin is warm and dry. There is pallor.   Psychiatric: He is withdrawn. He exhibits a depressed mood.           Significant Labs:   CBC:   Recent Labs   Lab 04/25/19 2154   WBC 5.92   HGB 6.2*   HCT 20.5*   *     CMP:   Recent Labs   Lab 04/25/19 2154      K 3.5      CO2 27      BUN 99*   CREATININE 3.1*   CALCIUM 10.6*   PROT 6.4   ALBUMIN 2.9*   BILITOT 2.0*    ALKPHOS 70   AST 16   ALT 11   ANIONGAP 16   EGFRNONAA 20*       Significant Imaging: I have reviewed all pertinent imaging results/findings within the past 24 hours.

## 2019-04-26 NOTE — ED NOTES
pts wife threw away the urinal with the urine sample down the drainage. pt and wife given another urinal and knows that we need another urine sample.

## 2019-04-26 NOTE — NURSING
Pt's wife emptied urinal before urine sample was obtained. Pt wife was made aware about the need for urine sample.

## 2019-04-26 NOTE — H&P
[facility] Hospital Medicine  History & Physical    Patient Name: Ronnie Sandoval  MRN: [id number]  Admission Date: 4/25/2019  Attending Physician: Cely Hodges MD   Primary Care Provider: Daniel Hoover MD         Patient information was obtained from patient, spouse/SO, past medical records and ER records.     Subjective:     Principal Problem:Acute on chronic heart failure    Chief Complaint:   Chief Complaint   Patient presents with    Leg Swelling     reports new onset increased swelling with weakness, abd distention noted with confusion         HPI: The patient is a 63 y.o. male, history of malignant carcinoid tumor of the rectum, secondary neuroendocrine tumor of liver, and secondary malignant neoplasm of bone, who presents with complaint of bilateral leg weakness starting this morning.  His wife also reports subjective fever last night.  He also experienced SOB when laying flat.  His wife also reports some confusion when asked who the president was by intake nurse upon arrival to ED.  He reports using walker at baseline, and ongoing bilateral low extremity swelling for one year.  He is currently in physical therapy for leg weakness.        Past Medical History:   Diagnosis Date    Acute deep vein thrombosis (DVT) of femoral vein of left lower extremity 10/2/2017    Anemia     Anemia in neoplastic disease 10/2/2017    Chemotherapy follow-up examination 5/26/2015    Colon polyps     Encounter for blood transfusion     Hx antineoplastic chemotherapy 12/2014-1/2015     16 + Cisplatin    Hyperlipidemia     Hypertension     Increased bilirubin level 10/2/2017    Malignant carcinoid tumor of rectum 5/26/2015    Malignant carcinoid tumor of the rectum     Neuroendocrine carcinoma, unknown primary site 4/14/2015    Papillary thyroid carcinoma 4/14/2015    Secondary malignant neoplasm of bone 4/14/2015    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    left  groin mass; Ki 67-20%    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    Secondary neuroendocrine tumor of liver(209.72) 11/2014    Stage 3 chronic kidney disease 10/2/2017    Thrombocytopenia 10/2/2017    Urinary tract infection        Past Surgical History:   Procedure Laterality Date    COLONOSCOPY      excision of mass      left mastoid    EXCISION-MASS Left 8/5/2013    Performed by [facility]tilio Ramirez [facility] at [facility] OR    INSERTION-FILTER-INFERIOR VENA CAVA N/A 10/23/2017    Performed [facility] OR 2ND FLR    LIVER BIOPSY  11/14    Mass in grion excised  8/2013    Carcinoid    Mass in groin excised  6/2013    Carcinoid    THYROIDECTOMY  2014    [place] - Carcinoid    TRANSPROSTATIC TISSUE RETRACTION N/A 9/5/2017    Performed by Rafael Holland MD at [facility] OR       Review of patient's allergies indicates:  No Known Allergies    No current facility-administered medications on file prior to encounter.      Current Outpatient Medications on File Prior to Encounter   Medication Sig    cholecalciferol, vitamin D3, 50,000 unit capsule Take 1 capsule (50,000 Units total) by mouth once a week.    ferrous sulfate 325 mg (65 mg iron) Tab tablet Take 325 mg by mouth 2 (two) times daily.    furosemide (LASIX) 80 MG tablet TAKE 1 & 1/2 (ONE & ONE-HALF) TABLETS BY MOUTH TWICE DAILY    KLOR-CON M20 20 mEq tablet TAKE 1 TABLET BY MOUTH TWICE DAILY    levothyroxine (SYNTHROID) 75 MCG tablet TAKE ONE TABLET BY MOUTH ONCE DAILY BEFORE BREAKFAST    losartan (COZAAR) 100 MG tablet TAKE ONE TABLET BY MOUTH ONCE DAILY    metoprolol succinate (TOPROL-XL) 200 MG 24 hr tablet TAKE ONE TABLET BY MOUTH ONCE DAILY    multivit-min/FA/lycopen/lutein (CENTRUM SILVER MEN ORAL) Take 1 tablet by mouth once daily.    traMADol (ULTRAM) 50 mg tablet Take 1 tablet (50 mg total) by mouth every 8 (eight) hours as needed for Pain.    [DISCONTINUED] metOLazone (ZAROXOLYN) 2.5 MG tablet Take 1 tablet  (2.5 mg total) by mouth once daily. Take 30 min prior to lasix dose for 3-5 days.     Family History     Problem Relation (Age of Onset)    Cancer Father, Brother    Leukemia Father    Stroke Sister        Tobacco Use    Smoking status: Never Smoker    Smokeless tobacco: Never Used    Tobacco comment: Quit in 1979   Substance and Sexual Activity    Alcohol use: No    Drug use: No    Sexual activity: Not on file     Review of Systems   Constitutional: Positive for activity change and fatigue. Negative for appetite change and fever.   HENT: Negative for congestion, ear pain and postnasal drip.    Eyes: Negative for discharge.   Respiratory: Positive for shortness of breath. Negative for apnea and wheezing.    Cardiovascular: Positive for leg swelling. Negative for chest pain.   Gastrointestinal: Positive for abdominal distention. Negative for abdominal pain, nausea and vomiting.   Endocrine: Negative for polydipsia, polyphagia and polyuria.   Genitourinary: Negative for difficulty urinating, flank pain, frequency, hematuria and urgency.   Musculoskeletal: Positive for arthralgias and myalgias. Negative for joint swelling.   Skin: Negative for pallor and rash.   Allergic/Immunologic: Negative for environmental allergies and food allergies.   Neurological: Negative for dizziness, speech difficulty, weakness, light-headedness and headaches.   Hematological: Does not bruise/bleed easily.   Psychiatric/Behavioral: Negative for agitation.     Objective:     Vital Signs (Most Recent):  Temp: 98.4 °F (36.9 °C) (04/26/19 0105)  Pulse: 81 (04/26/19 0105)  Resp: 16 (04/26/19 0105)  BP: 122/65 (04/26/19 0105)  SpO2: 98 % (04/26/19 0105) Vital Signs (24h Range):  Temp:  [98.1 °F (36.7 °C)-98.4 °F (36.9 °C)] 98.4 °F (36.9 °C)  Pulse:  [78-86] 81  Resp:  [13-20] 16  SpO2:  [95 %-98 %] 98 %  BP: (122-132)/(59-68) 122/65     Weight: 88.5 kg (195 lb)  Body mass index is 28.8 kg/m².    Physical Exam   Constitutional: He appears  well-developed. He appears lethargic. He appears cachectic. He appears ill.   HENT:   Head: Normocephalic.   Eyes: Conjunctivae are normal.   Neck: Normal range of motion. Neck supple.   Cardiovascular: Normal rate and normal heart sounds. An irregularly irregular rhythm present.   Significant 3-4+ pitting edema bilateral lower extremities into abdomen   Pulmonary/Chest: Effort normal. He has decreased breath sounds in the right middle field, the right lower field, the left middle field and the left lower field.   Abdominal: Soft. He exhibits distension and ascites. Bowel sounds are increased. There is no tenderness.   Musculoskeletal: Normal range of motion.   Neurological: He appears lethargic. GCS eye subscore is 4. GCS verbal subscore is 4. GCS motor subscore is 6.   Skin: Skin is warm and dry. There is pallor.   Psychiatric: He is withdrawn. He exhibits a depressed mood.           Significant Labs:   CBC:   Recent Labs   Lab 04/25/19  2154   WBC 5.92   HGB 6.2*   HCT 20.5*   *     CMP:   Recent Labs   Lab 04/25/19  2154      K 3.5      CO2 27      BUN 99*   CREATININE 3.1*   CALCIUM 10.6*   PROT 6.4   ALBUMIN 2.9*   BILITOT 2.0*   ALKPHOS 70   AST 16   ALT 11   ANIONGAP 16   EGFRNONAA 20*       Significant Imaging: I have reviewed all pertinent imaging results/findings within the past 24 hours.    Assessment/Plan:     * Acute on chronic heart failure  BNP- 1910. Significant lower extremity/abdomen edema    Lasix 80 IV BID  Echo pending        Anemia  H/H- 6.2/20.5. Baseline 7.1-7.3/ 21-22. Believe partly dilutional    Lasix IV ordered  Will hold off on transfusion until response to Lasix.  CBC daily       Stage 3 chronic kidney disease  Creatinine- 3.1, baseline appears to be 2 to 2.7 Believe due to congestion, will give Lasix IV and CMP in AM      Post-surgical hypothyroidism  Continue levothyroxine      Hyperlipidemia  Lipid panel pending          VTE Risk Mitigation (From admission,  onward)        Ordered     Place BÁRBARA hose  Until discontinued      04/26/19 0108     Place sequential compression device  Until discontinued      04/26/19 0108     IP VTE HIGH RISK PATIENT  Once      04/26/19 0108     Place sequential compression device  Until discontinued      04/26/19 0108     Reason for No Pharmacological VTE Prophylaxis  Once      04/26/19 0108             Antonio Haywood NP  Department of Hospital Medicine   [facility]

## 2019-04-26 NOTE — PLAN OF CARE
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       Vital Signs: Routine.    Allergies:  Review of patient's allergies indicates:   Allergen Reactions    Epinephrine Other (See Comments)     Can precipitate carcinoid crisis       Diet: normal    Activities: As tolerated    Nursing: Per Hospice Routine    Medications:     Current Discharge Medication List      CONTINUE these medications which have NOT CHANGED    Details   cholecalciferol, vitamin D3, 50,000 unit capsule Take 1 capsule (50,000 Units total) by mouth once a week.  Qty: 12 capsule, Refills: 3      ferrous sulfate 325 mg (65 mg iron) Tab tablet Take 325 mg by mouth 2 (two) times daily.      furosemide (LASIX) 80 MG tablet TAKE 1 & 1/2 (ONE & ONE-HALF) TABLETS BY MOUTH TWICE DAILY  Qty: 180 tablet, Refills: 1    Comments: Please consider 90 day supplies to promote better adherence      KLOR-CON M20 20 mEq tablet TAKE 1 TABLET BY MOUTH TWICE DAILY  Qty: 180 tablet, Refills: 2      levothyroxine (SYNTHROID) 75 MCG tablet TAKE ONE TABLET BY MOUTH ONCE DAILY BEFORE BREAKFAST  Qty: 90 tablet, Refills: 3      losartan (COZAAR) 100 MG tablet TAKE ONE TABLET BY MOUTH ONCE DAILY  Qty: 90 tablet, Refills: 2      metoprolol succinate (TOPROL-XL) 200 MG 24 hr tablet TAKE ONE  TABLET BY MOUTH ONCE DAILY  Qty: 90 tablet, Refills: 2      multivit-min/FA/lycopen/lutein (CENTRUM SILVER MEN ORAL) Take 1 tablet by mouth once daily.      traMADol (ULTRAM) 50 mg tablet Take 1 tablet (50 mg total) by mouth every 8 (eight) hours as needed for Pain.  Qty: 60 tablet, Refills: 0         STOP taking these medications       metOLazone (ZAROXOLYN) 2.5 MG tablet Comments:   Reason for Stopping:                 _________________________________  KELBY Kramer  [facility]  04/26/2019

## 2019-04-26 NOTE — CONSULTS
Patient has only a small volume of ascites, paracentesis likely would not be of significant therapeutic benefit.

## 2019-04-27 LAB
AORTIC ROOT ANNULUS: 3.92 CM
AORTIC VALVE CUSP SEPERATION: 1.74 CM
ASCENDING AORTA: 3.46 CM
AV INDEX (PROSTH): 0.8
AV MEAN GRADIENT: 4.85 MMHG
AV PEAK GRADIENT: 9.99 MMHG
AV VALVE AREA: 2.94 CM2
AV VELOCITY RATIO: 0.7
BSA FOR ECHO PROCEDURE: 2.08 M2
CV ECHO LV RWT: 0.41 CM
DOP CALC AO PEAK VEL: 1.58 M/S
DOP CALC AO VTI: 34.55 CM
DOP CALC LVOT AREA: 3.66 CM2
DOP CALC LVOT DIAMETER: 2.16 CM
DOP CALC LVOT PEAK VEL: 1.1 M/S
DOP CALC LVOT STROKE VOLUME: 101.63 CM3
DOP CALCLVOT PEAK VEL VTI: 27.75 CM
E WAVE DECELERATION TIME: 118.4 MSEC
E/A RATIO: 0.57
E/E' RATIO: 18
ECHO LV POSTERIOR WALL: 1.2 CM (ref 0.6–1.1)
FRACTIONAL SHORTENING: 35 % (ref 28–44)
INTERVENTRICULAR SEPTUM: 1.2 CM (ref 0.6–1.1)
IVRT: 0.07 MSEC
LA MAJOR: 5.79 CM
LA MINOR: 6.24 CM
LA WIDTH: 5.56 CM
LEFT ATRIUM SIZE: 5.55 CM
LEFT ATRIUM VOLUME INDEX: 76.9 ML/M2
LEFT ATRIUM VOLUME: 157.55 CM3
LEFT INTERNAL DIMENSION IN SYSTOLE: 3.8 CM (ref 2.1–4)
LEFT VENTRICLE DIASTOLIC VOLUME INDEX: 82.37 ML/M2
LEFT VENTRICLE DIASTOLIC VOLUME: 168.69 ML
LEFT VENTRICLE MASS INDEX: 146.3 G/M2
LEFT VENTRICLE SYSTOLIC VOLUME INDEX: 30.2 ML/M2
LEFT VENTRICLE SYSTOLIC VOLUME: 61.77 ML
LEFT VENTRICULAR INTERNAL DIMENSION IN DIASTOLE: 5.83 CM (ref 3.5–6)
LEFT VENTRICULAR MASS: 299.53 G
LV LATERAL E/E' RATIO: 13.5
LV SEPTAL E/E' RATIO: 27
MV PEAK A VEL: 1.41 M/S
MV PEAK E VEL: 0.81 M/S
PISA TR MAX VEL: 2.4 M/S
PV PEAK VELOCITY: 1.22 CM/S
RA MAJOR: 4.74 CM
RA PRESSURE: 3 MMHG
RA WIDTH: 3.21 CM
RIGHT VENTRICULAR END-DIASTOLIC DIMENSION: 3.77 CM
SINUS: 4.07 CM
STJ: 3.75 CM
TDI LATERAL: 0.06
TDI SEPTAL: 0.03
TDI: 0.05
TR MAX PG: 23.04 MMHG
TRICUSPID ANNULAR PLANE SYSTOLIC EXCURSION: 1.25 CM
TV REST PULMONARY ARTERY PRESSURE: 26 MMHG

## 2019-04-27 NOTE — DISCHARGE SUMMARY
[facility] Hospital Medicine  Discharge Summary      Patient Name: Ronnie Sandoval  MRN: [id number]  Admission Date: 4/25/2019  Hospital Length of Stay: 1 days  Discharge Date and Time: 4/26/2019  3:07 PM  Attending Physician: Cely Hodges MD   Discharging Provider: Brenda Engel NP  Primary Care Provider: Daniel Hoover MD      HPI:   The patient is a 63 y.o. male, history of malignant carcinoid tumor of the rectum, secondary neuroendocrine tumor of liver, and secondary malignant neoplasm of bone, who presents with complaint of bilateral leg weakness starting this morning.  His wife also reports subjective fever last night.  He also experienced SOB when laying flat.  His wife also reports some confusion when asked who the president was by intake nurse upon arrival to ED.  He reports using walker at baseline, and ongoing bilateral low extremity swelling for one year.  He is currently in physical therapy for leg weakness.              Hospital Course:   Mr. Ronnie Sandoval with a long history of metastatic neuroendocrine carcinoma was admitted under Observation for further evaluation of worsening of his chronic shortness of breath.  The patient had an abdominal ultrasound that revealed only very small volume of ascites.  Case discussed with Interventional Radiology who felt that paracentesis for this small volume would not be of significant therapeutic benefit.  Per the patient wife, the patient had received neuroendocrine targeted chemotherapy up until 2015 at which point she states the patient declined further intervention or treatment.  I recommended the patient return to the Neuorendocrine clinic, but the patient and wife declined.  I was able to discuss advanced directives with the patient and Palliative Care was consulted.  The patient and wife requested the patient be discharged without further evaluation or management to home with Hospice Services.       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    Specialty:  Internal Medicine  Why:  As needed  Contact information:  [address]  [address]  [place] [place] [facility] [address]  [phone]             [facility].    Specialty:  Hospice Services  Why:  Hospice  Contact information:  [phone]                 Patient Instructions:      Diet Adult Regular     Notify your health care provider if you experience any of the following:  temperature >100.4     Notify your health care provider if you experience any of the following:  severe uncontrolled pain     Activity as tolerated       Significant Diagnostic Studies: Labs:   CMP   , CBC   No results for input(s): WBC, HGB, HCT, PLT in the last 48 hours. and All labs within the past 24 hours have been reviewed    Pending Diagnostic Studies:     Procedure Component Value Units Date/Time    Transthoracic echo (TTE) complete (Cupid Only) [[id number]]  (Abnormal)  · Mild eccentric left ventricular hypertrophy.  · Normal  left ventricular systolic function. The estimated ejection fraction is 60%  · No wall motion abnormalities.  · Grade I (mild) left ventricular diastolic dysfunction consistent with impaired relaxation.  · Normal left atrial pressure.  · Normal right ventricular systolic function.  · Severe left atrial enlargement.  · Mild mitral regurgitation.  · Mild tricuspid regurgitation.  · Normal central venous pressure (3 mm Hg).  · The estimated PA systolic pressure is 26 mm Hg  · The aortic root is mildly dilated.  · Pericardial effusion. Resulted:  04/26/19 1528     Medications:  Reconciled Home Medications:      Medication List      CONTINUE taking these medications    CENTRUM SILVER MEN ORAL  Take 1 tablet by mouth once daily.     cholecalciferol (vitamin D3) 50,000 unit capsule  Take 1 capsule (50,000 Units total) by mouth once a week.     ferrous sulfate 325 mg (65 mg iron) Tab tablet  Commonly known as:  FEOSOL  Take 325 mg by mouth 2 (two) times daily.     furosemide 80 MG tablet  Commonly known as:  LASIX  TAKE 1 & 1/2 (ONE & ONE-HALF) TABLETS BY MOUTH TWICE DAILY     KLOR-CON M20 20 MEQ tablet  Generic drug:  potassium chloride SA  TAKE 1 TABLET BY MOUTH TWICE DAILY     levothyroxine 75 MCG tablet  Commonly known as:  SYNTHROID  TAKE ONE TABLET BY MOUTH ONCE DAILY BEFORE BREAKFAST     losartan 100 MG tablet  Commonly known as:  COZAAR  TAKE ONE TABLET BY MOUTH ONCE DAILY     metoprolol succinate 200 MG 24 hr tablet  Commonly known as:  TOPROL-XL  TAKE ONE TABLET BY MOUTH ONCE DAILY     traMADol 50 mg tablet  Commonly known as:  ULTRAM  Take 1 tablet (50 mg total) by mouth every 8 (eight) hours as needed for Pain.              Time spent on the discharge of patient: >30 minutes  Patient was seen and examined on the date of discharge and determined to be suitable for discharge.         Brenda Engel NP  Department of Hospital Medicine  [facility]

## 2019-04-28 NOTE — HOSPITAL COURSE
Mr. Ronnie Sandoval with a long history of metastatic neuroendocrine carcinoma was admitted under Observation for further evaluation of worsening of his chronic shortness of breath.  The patient had an abdominal ultrasound that revealed only very small volume of ascites.  Case discussed with Interventional Radiology who felt that paracentesis for this small volume would not be of significant therapeutic benefit.  Per the patient wife, the patient had received neuroendocrine targeted chemotherapy up until 2015 at which point she states the patient declined further intervention or treatment.  I recommended the patient return to the Neuorendocrine clinic, but the patient and wife declined.  I was able to discuss advanced directives with the patient and Palliative Care was consulted.  The patient and wife requested the patient be discharged without further evaluation or management to home with Hospice Services.

## 2019-04-29 LAB
BLD PROD TYP BPU: NORMAL
BLD PROD TYP BPU: NORMAL
BLOOD UNIT EXPIRATION DATE: NORMAL
BLOOD UNIT EXPIRATION DATE: NORMAL
BLOOD UNIT TYPE CODE: 7300
BLOOD UNIT TYPE CODE: 7300
BLOOD UNIT TYPE: NORMAL
BLOOD UNIT TYPE: NORMAL
CODING SYSTEM: NORMAL
CODING SYSTEM: NORMAL
DISPENSE STATUS: NORMAL
DISPENSE STATUS: NORMAL
TRANS ERYTHROCYTES VOL PATIENT: NORMAL ML
TRANS ERYTHROCYTES VOL PATIENT: NORMAL ML

## 2019-05-06 ENCOUNTER — TELEPHONE (OUTPATIENT)
Dept: ADMINISTRATIVE | Facility: CLINIC | Age: 64
End: 2019-05-06

## 2019-05-06 NOTE — TELEPHONE ENCOUNTER
Home Health SOC 03/08/2019 - 05/06/2019 with [facility] - Dr. Daniel Hoover. Patient received SN and PT services.

## 2019-09-13 DIAGNOSIS — N18.9 CHRONIC KIDNEY DISEASE, UNSPECIFIED CKD STAGE: ICD-10-CM

## 2019-09-19 ENCOUNTER — PATIENT OUTREACH (OUTPATIENT)
Dept: ADMINISTRATIVE | Facility: HOSPITAL | Age: 64
End: 2019-09-19

## 2019-09-19 DIAGNOSIS — Z12.11 SCREENING FOR COLON CANCER: Primary | ICD-10-CM

## (undated) DEVICE — ELECTRODE REM PLYHSV RETURN 9

## (undated) DEVICE — GLOVE BIOGEL SKINSENSE PI 7.5

## (undated) DEVICE — SOL IRR WATER STRL 3000 ML

## (undated) DEVICE — SOL 9P NACL IRR PIC IL

## (undated) DEVICE — BRUSH SCRUB SURGICALW/BETADINE

## (undated) DEVICE — Device

## (undated) DEVICE — SET CYSTO IRRIGATION UNIV SPIK

## (undated) DEVICE — SEE L#152161